# Patient Record
Sex: FEMALE | Race: WHITE | NOT HISPANIC OR LATINO | Employment: OTHER | URBAN - METROPOLITAN AREA
[De-identification: names, ages, dates, MRNs, and addresses within clinical notes are randomized per-mention and may not be internally consistent; named-entity substitution may affect disease eponyms.]

---

## 2017-01-16 ENCOUNTER — HOSPITAL ENCOUNTER (OUTPATIENT)
Dept: ONCOLOGY | Facility: CLINIC | Age: 57
Discharge: HOME | End: 2017-01-16
Attending: INTERNAL MEDICINE | Admitting: INTERNAL MEDICINE

## 2017-01-16 LAB
ALBUMIN SERPL-MCNC: 3.4 G/DL (ref 3.5–4.8)
ALBUMIN SERPL-MCNC: 3.5 G/DL (ref 3.5–4.8)
ALBUMIN/GLOB SERPL: 1.5 {RATIO} (ref 1–1.7)
ALP SERPL-CCNC: 78 IU/L (ref 32–91)
ALPHA1 GLOB FLD ELPH-MCNC: 0.1 GM/DL (ref 0.1–0.4)
ALPHA2 GLOB SERPL ELPH-MCNC: 0.8 GM/DL (ref 0.5–1)
ALT SERPL-CCNC: 28 IU/L (ref 14–54)
ANION GAP SERPL CALC-SCNC: 12.7 MMOL/L (ref 10–20)
AST SERPL-CCNC: 19 IU/L (ref 15–41)
B-GLOBULIN SERPL ELPH-MCNC: 0.9 GM/DL (ref 0.7–1.4)
BILIRUB SERPL-MCNC: 0.5 MG/DL (ref 0.3–1.2)
BUN SERPL-MCNC: 20 MG/DL (ref 8–20)
BUN/CREAT SERPL: 25 (ref 5.4–26.2)
CALCIUM SERPL-MCNC: 9 MG/DL (ref 8.9–10.3)
CHLORIDE SERPL-SCNC: 101 MMOL/L (ref 101–111)
CONV CO2: 28 MMOL/L (ref 22–32)
CONV TOTAL PROTEIN: 5.6 G/DL (ref 6.1–7.9)
CONV TOTAL PROTEIN: 5.7 G/DL (ref 6.1–7.9)
CREAT UR-MCNC: 0.8 MG/DL (ref 0.4–1)
GAMMA GLOB SERPL ELPH-MCNC: 0.3 GM/DL (ref 0.6–1.6)
GLOBULIN UR ELPH-MCNC: 2.3 G/DL (ref 2.5–3.8)
GLUCOSE SERPL-MCNC: 118 MG/DL (ref 65–99)
INSULIN SERPL-ACNC: ABNORMAL U[IU]/ML
POTASSIUM SERPL-SCNC: 3.7 MMOL/L (ref 3.6–5.1)
SODIUM SERPL-SCNC: 138 MMOL/L (ref 136–144)

## 2017-01-17 LAB — PROT PATTERN SERPL IFE-IMP: NORMAL

## 2017-02-09 ENCOUNTER — HOSPITAL ENCOUNTER (OUTPATIENT)
Dept: MRI IMAGING | Facility: HOSPITAL | Age: 57
Discharge: HOME | End: 2017-02-09
Attending: INTERNAL MEDICINE | Admitting: INTERNAL MEDICINE

## 2017-05-08 ENCOUNTER — HOSPITAL ENCOUNTER (OUTPATIENT)
Dept: ONCOLOGY | Facility: CLINIC | Age: 57
Discharge: HOME | End: 2017-05-08
Attending: INTERNAL MEDICINE | Admitting: INTERNAL MEDICINE

## 2017-05-08 LAB
ALBUMIN SERPL-MCNC: 4.1 G/DL (ref 3.5–4.8)
ALBUMIN/GLOB SERPL: 1.6 {RATIO} (ref 1–1.7)
ALP SERPL-CCNC: 100 IU/L (ref 32–91)
ALT SERPL-CCNC: 31 IU/L (ref 14–54)
ANION GAP SERPL CALC-SCNC: 18 MMOL/L (ref 10–20)
AST SERPL-CCNC: 24 IU/L (ref 15–41)
BILIRUB SERPL-MCNC: 0.5 MG/DL (ref 0.3–1.2)
BUN SERPL-MCNC: 14 MG/DL (ref 8–20)
BUN/CREAT SERPL: 15.6 (ref 5.4–26.2)
CALCIUM SERPL-MCNC: 10.1 MG/DL (ref 8.9–10.3)
CHLORIDE SERPL-SCNC: 103 MMOL/L (ref 101–111)
CONV CO2: 25 MMOL/L (ref 22–32)
CONV TOTAL PROTEIN: 6.7 G/DL (ref 6.1–7.9)
CREAT UR-MCNC: 0.9 MG/DL (ref 0.4–1)
GLOBULIN UR ELPH-MCNC: 2.6 G/DL (ref 2.5–3.8)
GLUCOSE SERPL-MCNC: 101 MG/DL (ref 65–99)
POTASSIUM SERPL-SCNC: 4 MMOL/L (ref 3.6–5.1)
SODIUM SERPL-SCNC: 142 MMOL/L (ref 136–144)

## 2017-07-12 ENCOUNTER — HOSPITAL ENCOUNTER (OUTPATIENT)
Dept: MAMMOGRAPHY | Facility: HOSPITAL | Age: 57
Discharge: HOME | End: 2017-07-12
Attending: INTERNAL MEDICINE | Admitting: INTERNAL MEDICINE

## 2017-09-11 ENCOUNTER — CLINICAL SUPPORT (OUTPATIENT)
Dept: ONCOLOGY | Facility: HOSPITAL | Age: 57
End: 2017-09-11

## 2017-09-11 ENCOUNTER — HOSPITAL ENCOUNTER (OUTPATIENT)
Dept: ONCOLOGY | Facility: HOSPITAL | Age: 57
Discharge: HOME | End: 2017-09-11
Attending: INTERNAL MEDICINE | Admitting: INTERNAL MEDICINE

## 2017-09-11 ENCOUNTER — HOSPITAL ENCOUNTER (OUTPATIENT)
Dept: ONCOLOGY | Facility: CLINIC | Age: 57
Setting detail: INFUSION SERIES
Discharge: HOME | End: 2017-09-11
Attending: INTERNAL MEDICINE | Admitting: INTERNAL MEDICINE

## 2017-09-11 NOTE — PROGRESS NOTES
PATIENTS ONCOLOGY RECORD LOCATED IN [facility]      Subjective     Name:  GINA SPARKS     Date:  2017  Address:  [address] [place] [address]  Home: [phone]  :  1960 AGE:  57 y.o.        RECORDS OBTAINED:  Patients Oncology Record is located in [facility]

## 2018-02-15 ENCOUNTER — HOSPITAL ENCOUNTER (OUTPATIENT)
Dept: MRI IMAGING | Facility: HOSPITAL | Age: 58
Discharge: HOME | End: 2018-02-15
Attending: INTERNAL MEDICINE | Admitting: INTERNAL MEDICINE

## 2018-03-12 ENCOUNTER — HOSPITAL ENCOUNTER (OUTPATIENT)
Dept: ONCOLOGY | Facility: HOSPITAL | Age: 58
Discharge: HOME | End: 2018-03-12
Attending: INTERNAL MEDICINE | Admitting: INTERNAL MEDICINE

## 2018-03-12 ENCOUNTER — HOSPITAL ENCOUNTER (OUTPATIENT)
Dept: ONCOLOGY | Facility: CLINIC | Age: 58
Setting detail: INFUSION SERIES
Discharge: HOME | End: 2018-03-12
Attending: INTERNAL MEDICINE | Admitting: INTERNAL MEDICINE

## 2018-03-12 ENCOUNTER — CLINICAL SUPPORT (OUTPATIENT)
Dept: ONCOLOGY | Facility: HOSPITAL | Age: 58
End: 2018-03-12

## 2018-03-12 NOTE — PROGRESS NOTES
PATIENTS ONCOLOGY RECORD LOCATED IN [facility]      Subjective     Name:  GINA SPARKS     Date:  2018  Address:  [address] [place] [address]  Home: [phone]  :  1960 AGE:  57 y.o.        RECORDS OBTAINED:  Patients Oncology Record is located in [facility]

## 2018-09-10 ENCOUNTER — CLINICAL SUPPORT (OUTPATIENT)
Dept: ONCOLOGY | Facility: HOSPITAL | Age: 58
End: 2018-09-10

## 2018-09-10 ENCOUNTER — HOSPITAL ENCOUNTER (OUTPATIENT)
Dept: ONCOLOGY | Facility: HOSPITAL | Age: 58
Discharge: HOME | End: 2018-09-10
Attending: INTERNAL MEDICINE | Admitting: INTERNAL MEDICINE

## 2018-09-10 ENCOUNTER — HOSPITAL ENCOUNTER (OUTPATIENT)
Dept: ONCOLOGY | Facility: CLINIC | Age: 58
Setting detail: INFUSION SERIES
Discharge: HOME | End: 2018-09-10
Attending: INTERNAL MEDICINE | Admitting: INTERNAL MEDICINE

## 2018-09-10 LAB
ALBUMIN SERPL-MCNC: 3.9 G/DL (ref 3.5–4.8)
ALBUMIN/GLOB SERPL: 1.5 {RATIO} (ref 1–1.7)
ALP SERPL-CCNC: 102 IU/L (ref 32–91)
ALT SERPL-CCNC: 26 IU/L (ref 14–54)
ANION GAP SERPL CALC-SCNC: 11.9 MMOL/L (ref 10–20)
AST SERPL-CCNC: 24 IU/L (ref 15–41)
BILIRUB SERPL-MCNC: 0.1 MG/DL (ref 0.3–1.2)
BUN SERPL-MCNC: 19 MG/DL (ref 8–20)
BUN/CREAT SERPL: 23.8 (ref 5.4–26.2)
CALCIUM SERPL-MCNC: 9.3 MG/DL (ref 8.9–10.3)
CHLORIDE SERPL-SCNC: 104 MMOL/L (ref 101–111)
CONV CO2: 25 MMOL/L (ref 22–32)
CONV TOTAL PROTEIN: 6.5 G/DL (ref 6.1–7.9)
CREAT UR-MCNC: 0.8 MG/DL (ref 0.4–1)
GLOBULIN UR ELPH-MCNC: 2.6 G/DL (ref 2.5–3.8)
GLUCOSE SERPL-MCNC: 102 MG/DL (ref 65–99)
POTASSIUM SERPL-SCNC: 3.9 MMOL/L (ref 3.6–5.1)
SODIUM SERPL-SCNC: 137 MMOL/L (ref 136–144)

## 2018-09-10 NOTE — PROGRESS NOTES
PATIENTS ONCOLOGY RECORD LOCATED IN [facility]      Subjective     Name:  GINA SPARKS     Date:  09/10/2018  Address:  [address] [place] [address]  Home: [phone]  :  1960 AGE:  58 y.o.        RECORDS OBTAINED:  Patients Oncology Record is located in [facility]

## 2018-09-18 ENCOUNTER — HOSPITAL ENCOUNTER (OUTPATIENT)
Dept: CT IMAGING | Facility: HOSPITAL | Age: 58
Discharge: HOME | End: 2018-09-18
Attending: INTERNAL MEDICINE | Admitting: INTERNAL MEDICINE

## 2019-02-04 ENCOUNTER — HOSPITAL ENCOUNTER (OUTPATIENT)
Dept: ONCOLOGY | Facility: HOSPITAL | Age: 59
Discharge: HOME | End: 2019-02-04
Attending: INTERNAL MEDICINE | Admitting: INTERNAL MEDICINE

## 2019-02-04 ENCOUNTER — CLINICAL SUPPORT (OUTPATIENT)
Dept: ONCOLOGY | Facility: HOSPITAL | Age: 59
End: 2019-02-04

## 2019-02-04 ENCOUNTER — HOSPITAL ENCOUNTER (OUTPATIENT)
Dept: ONCOLOGY | Facility: CLINIC | Age: 59
Setting detail: INFUSION SERIES
Discharge: HOME | End: 2019-02-04
Attending: INTERNAL MEDICINE | Admitting: INTERNAL MEDICINE

## 2019-02-04 LAB
ALBUMIN SERPL-MCNC: 3.6 G/DL (ref 3.5–4.8)
ALBUMIN SERPL-MCNC: 4 G/DL (ref 3.5–4.8)
ALBUMIN/GLOB SERPL: 1.6 {RATIO} (ref 1–1.7)
ALP SERPL-CCNC: 101 IU/L (ref 32–91)
ALPHA1 GLOB FLD ELPH-MCNC: 0.2 GM/DL (ref 0.1–0.4)
ALPHA2 GLOB SERPL ELPH-MCNC: 0.9 GM/DL (ref 0.5–1)
ALT SERPL-CCNC: 30 IU/L (ref 14–54)
ANION GAP SERPL CALC-SCNC: 14.1 MMOL/L (ref 10–20)
AST SERPL-CCNC: 24 IU/L (ref 15–41)
B-GLOBULIN SERPL ELPH-MCNC: 1.1 GM/DL (ref 0.7–1.4)
BILIRUB SERPL-MCNC: 0.4 MG/DL (ref 0.3–1.2)
BUN SERPL-MCNC: 17 MG/DL (ref 8–20)
BUN/CREAT SERPL: 18.9 (ref 5.4–26.2)
CALCIUM SERPL-MCNC: 9.4 MG/DL (ref 8.9–10.3)
CHLORIDE SERPL-SCNC: 103 MMOL/L (ref 101–111)
CHOLEST SERPL-MCNC: 116 MG/DL
CHOLEST/HDLC SERPL: 2.7 {RATIO}
CONV CO2: 26 MMOL/L (ref 22–32)
CONV LDL CHOLESTEROL DIRECT: 66 MG/DL (ref 0–100)
CONV TOTAL PROTEIN: 6.4 G/DL (ref 6.1–7.9)
CONV TOTAL PROTEIN: 6.5 G/DL (ref 6.1–7.9)
CREAT UR-MCNC: 0.9 MG/DL (ref 0.4–1)
GAMMA GLOB SERPL ELPH-MCNC: 0.6 GM/DL (ref 0.6–1.6)
GLOBULIN UR ELPH-MCNC: 2.5 G/DL (ref 2.5–3.8)
GLUCOSE SERPL-MCNC: 93 MG/DL (ref 65–99)
HDLC SERPL-MCNC: 43 MG/DL
INSULIN SERPL-ACNC: NORMAL U[IU]/ML
LDLC/HDLC SERPL: 1.5 {RATIO}
LIPID INTERPRETATION: NORMAL
POTASSIUM SERPL-SCNC: 4.1 MMOL/L (ref 3.6–5.1)
SODIUM SERPL-SCNC: 139 MMOL/L (ref 136–144)
TRIGL SERPL-MCNC: 69 MG/DL
VLDLC SERPL CALC-MCNC: 7.4 MG/DL

## 2019-02-04 NOTE — PROGRESS NOTES
PATIENTS ONCOLOGY RECORD LOCATED IN [facility]      Subjective     Name:  GINA SPARKS     Date:  2019  Address:  [address] [place] [address]  Home: [unfilled]  :  1960 AGE:  58 y.o.        RECORDS OBTAINED:  Patients Oncology Record is located in [facility]

## 2019-02-06 LAB — PROT PATTERN SERPL IFE-IMP: NORMAL

## 2019-02-11 ENCOUNTER — HOSPITAL ENCOUNTER (OUTPATIENT)
Dept: ONCOLOGY | Facility: HOSPITAL | Age: 59
Discharge: HOME | End: 2019-02-11
Attending: INTERNAL MEDICINE | Admitting: INTERNAL MEDICINE

## 2019-02-11 ENCOUNTER — CLINICAL SUPPORT (OUTPATIENT)
Dept: ONCOLOGY | Facility: HOSPITAL | Age: 59
End: 2019-02-11

## 2019-02-11 ENCOUNTER — HOSPITAL ENCOUNTER (OUTPATIENT)
Dept: ONCOLOGY | Facility: CLINIC | Age: 59
Setting detail: INFUSION SERIES
Discharge: HOME | End: 2019-02-11
Attending: INTERNAL MEDICINE | Admitting: INTERNAL MEDICINE

## 2019-02-11 NOTE — PROGRESS NOTES
PATIENTS ONCOLOGY RECORD LOCATED IN [facility]      Subjective     Name:  GINA SPARKS     Date:  2019  Address:  [address] [place] [address]  Home: [unfilled]  :  1960 AGE:  58 y.o.        RECORDS OBTAINED:  Patients Oncology Record is located in [facility]

## 2019-02-22 ENCOUNTER — HOSPITAL ENCOUNTER (OUTPATIENT)
Dept: MRI IMAGING | Facility: HOSPITAL | Age: 59
Discharge: HOME | End: 2019-02-22
Attending: INTERNAL MEDICINE | Admitting: INTERNAL MEDICINE

## 2019-07-15 ENCOUNTER — TELEPHONE (OUTPATIENT)
Dept: ONCOLOGY | Facility: CLINIC | Age: 59
End: 2019-07-15

## 2019-07-15 NOTE — TELEPHONE ENCOUNTER
Patient states she received letter stating it was time to schedule her mammogram.  Needs to be performed at [facility] [place]

## 2019-08-12 ENCOUNTER — APPOINTMENT (OUTPATIENT)
Dept: LAB | Facility: HOSPITAL | Age: 59
End: 2019-08-12

## 2019-08-12 ENCOUNTER — OFFICE VISIT (OUTPATIENT)
Dept: ONCOLOGY | Facility: CLINIC | Age: 59
End: 2019-08-12

## 2019-08-12 VITALS
TEMPERATURE: 99.1 F | DIASTOLIC BLOOD PRESSURE: 94 MMHG | BODY MASS INDEX: 39.57 KG/M2 | RESPIRATION RATE: 18 BRPM | SYSTOLIC BLOOD PRESSURE: 164 MMHG | WEIGHT: 209.6 LBS | HEIGHT: 61 IN | HEART RATE: 95 BPM

## 2019-08-12 DIAGNOSIS — C50.911 MALIGNANT NEOPLASM OF RIGHT BREAST IN FEMALE, ESTROGEN RECEPTOR POSITIVE, UNSPECIFIED SITE OF BREAST (HCC): Primary | ICD-10-CM

## 2019-08-12 DIAGNOSIS — D47.2 MONOCLONAL GAMMOPATHY: ICD-10-CM

## 2019-08-12 DIAGNOSIS — Z17.0 MALIGNANT NEOPLASM OF RIGHT BREAST IN FEMALE, ESTROGEN RECEPTOR POSITIVE, UNSPECIFIED SITE OF BREAST (HCC): Primary | ICD-10-CM

## 2019-08-12 DIAGNOSIS — Z78.0 POSTMENOPAUSAL: ICD-10-CM

## 2019-08-12 LAB
ALBUMIN SERPL-MCNC: 4 G/DL (ref 3.5–4.8)
ALBUMIN/GLOB SERPL: 1.7 G/DL (ref 1–1.7)
ALP SERPL-CCNC: 96 U/L (ref 32–91)
ALT SERPL W P-5'-P-CCNC: 31 U/L (ref 14–54)
ANION GAP SERPL CALCULATED.3IONS-SCNC: 14 MMOL/L (ref 5–15)
AST SERPL-CCNC: 23 U/L (ref 15–41)
BASOPHILS # BLD AUTO: 0.01 10*3/MM3 (ref 0–0.2)
BASOPHILS NFR BLD AUTO: 0.1 % (ref 0–1.5)
BILIRUB SERPL-MCNC: 0.5 MG/DL (ref 0.3–1.2)
BUN BLD-MCNC: 21 MG/DL (ref 8–20)
BUN/CREAT SERPL: 26.3 (ref 5.4–26.2)
CALCIUM SPEC-SCNC: 9.3 MG/DL (ref 8.9–10.3)
CHLORIDE SERPL-SCNC: 104 MMOL/L (ref 101–111)
CO2 SERPL-SCNC: 24 MMOL/L (ref 22–32)
CREAT BLD-MCNC: 0.8 MG/DL (ref 0.4–1)
DEPRECATED RDW RBC AUTO: 45.2 FL (ref 37–54)
EOSINOPHIL # BLD AUTO: 0.07 10*3/MM3 (ref 0–0.4)
EOSINOPHIL NFR BLD AUTO: 1 % (ref 0.3–6.2)
ERYTHROCYTE [DISTWIDTH] IN BLOOD BY AUTOMATED COUNT: 14.4 % (ref 12.3–15.4)
GFR SERPL CREATININE-BSD FRML MDRD: 73 ML/MIN/1.73
GLOBULIN UR ELPH-MCNC: 2.3 GM/DL (ref 2.5–3.8)
GLUCOSE BLD-MCNC: 100 MG/DL (ref 65–99)
HCT VFR BLD AUTO: 39.6 % (ref 34–46.6)
HGB BLD-MCNC: 13 G/DL (ref 12–15.9)
LYMPHOCYTES # BLD AUTO: 2.25 10*3/MM3 (ref 0.7–3.1)
LYMPHOCYTES NFR BLD AUTO: 31.8 % (ref 19.6–45.3)
MCH RBC QN AUTO: 28.8 PG (ref 26.6–33)
MCHC RBC AUTO-ENTMCNC: 32.8 G/DL (ref 31.5–35.7)
MCV RBC AUTO: 87.6 FL (ref 79–97)
MONOCYTES # BLD AUTO: 0.73 10*3/MM3 (ref 0.1–0.9)
MONOCYTES NFR BLD AUTO: 10.3 % (ref 5–12)
NEUTROPHILS # BLD AUTO: 4.01 10*3/MM3 (ref 1.7–7)
NEUTROPHILS NFR BLD AUTO: 56.8 % (ref 42.7–76)
PLATELET # BLD AUTO: 174 10*3/MM3 (ref 140–450)
PMV BLD AUTO: 11.9 FL (ref 6–12)
POTASSIUM BLD-SCNC: 4 MMOL/L (ref 3.6–5.1)
PROT SERPL-MCNC: 6.3 G/DL (ref 6.1–7.9)
RBC # BLD AUTO: 4.52 10*6/MM3 (ref 3.77–5.28)
SODIUM BLD-SCNC: 138 MMOL/L (ref 136–144)
WBC NRBC COR # BLD: 7.07 10*3/MM3 (ref 3.4–10.8)

## 2019-08-12 PROCEDURE — 99215 OFFICE O/P EST HI 40 MIN: CPT | Performed by: INTERNAL MEDICINE

## 2019-08-12 PROCEDURE — 85025 COMPLETE CBC W/AUTO DIFF WBC: CPT | Performed by: INTERNAL MEDICINE

## 2019-08-12 PROCEDURE — 80053 COMPREHEN METABOLIC PANEL: CPT | Performed by: NURSE PRACTITIONER

## 2019-08-12 RX ORDER — LEVOTHYROXINE SODIUM 0.05 MG/1
TABLET ORAL EVERY 24 HOURS
COMMUNITY
Start: 2018-01-13 | End: 2020-05-20

## 2019-08-12 RX ORDER — MELOXICAM 15 MG/1
TABLET ORAL EVERY 24 HOURS
COMMUNITY
Start: 2018-07-03 | End: 2019-08-28 | Stop reason: SDUPTHER

## 2019-08-12 RX ORDER — BUPROPION HYDROCHLORIDE 150 MG/1
150 TABLET ORAL EVERY MORNING
COMMUNITY
Start: 2019-08-01

## 2019-08-12 RX ORDER — ATORVASTATIN CALCIUM 20 MG/1
TABLET, FILM COATED ORAL
COMMUNITY
Start: 2019-08-02 | End: 2020-03-18 | Stop reason: SDUPTHER

## 2019-08-12 RX ORDER — PANTOPRAZOLE SODIUM 40 MG/1
40 TABLET, DELAYED RELEASE ORAL DAILY
COMMUNITY
Start: 2019-05-31

## 2019-08-12 RX ORDER — ESCITALOPRAM OXALATE 20 MG/1
20 TABLET ORAL DAILY
COMMUNITY
Start: 2019-06-22

## 2019-08-12 RX ORDER — MULTIVITAMIN WITH IRON
500 TABLET ORAL DAILY
COMMUNITY

## 2019-08-12 RX ORDER — COVID-19 ANTIGEN TEST
2 KIT MISCELLANEOUS
COMMUNITY
End: 2020-10-13

## 2019-08-12 NOTE — PROGRESS NOTES
Hematology/Oncology Outpatient Follow Up    PATIENT NAME:Sindy Martin  :1960  MRN: [id number]  PRIMARY CARE PHYSICIAN: Rubens Cali MD  REFERRING PHYSICIAN: Rubens Cali MD    Chief Complaint   Patient presents with   • Follow-up     Breast Cancer   • Follow-up     Monitor drug toxicity        HISTORY OF PRESENT ILLNESS:   This is a 58-year-old female who was originally seen on 14 after she presented with early stage right breast cancer.  Please refer to the details of my notes for more information.   • She had an abnormal mammogram in May 2014.    • 6/10/14 - She underwent ultrasound guided localization of the right breast mass with sentinel lymph node injection, right lumpectomy.  Pathology revealed one sentinel lymph node negative for malignancy.  Tumor was strongly positive for ER, IN and HER-2/hiro was negative.  Tumor measured 1 cm in greatest dimension.  It was a grade III disease.  All the margins were negative.  Pathologic stage is [id number].    • 14 - Patient’s tumor was sent for Oncotype DX assay.  This returned with a recurrent score and validation study, she has a 12% chance of distant recurrence despite five years of Tamoxifen over the next 10 years.  On the validation study her ER was positive, IN was positive and HER-2/hiro was negative.  • 14 - Patient had BRCA 1 and 2 mutational analysis which returned with BRCA 2 deleterious mutation involving [id number] on the BRCA 2 sequencing gene.    • 2014 - She had FSH and serum estradiol levels, which are noted to be in the postmenopausal range.    • 14 - Patient was initiated on adjuvant chemotherapy with Taxotere and Cytoxan along with Neulasta.     • 10/6/14 - Patient completed 4th cycle of chemotherapy with Cytoxan and Taxotere.   • Patient was seen by Dr. Buck who performed excisional biopsy of a mole on the left forearm and this was benign from history.     • 14 - Patient completed whole right breast  radiation under the direction of Dr. Mcgrath.  She received total dose of 4256 cGy.  • Patient has been seen by Dr. Son who will perform her complete hysterectomy due to BRCA positivity after the first of the year.      • 12/23/14 - Patient had a bone density, which was essentially normal.    • December 2014 - Patient was initiated on Arimidex 1 mg every day as well as Os-Quinton D.   • April 2015 - Patient underwent total robotic hysterectomy with bilateral adnexectomy.  Pathology was benign.  I have requested for official report of the above procedure and path reports.  Patient has been seen by Dr. Son on 4/29/15.    • 5/19/15 - Port catheter was removed.   • 5/28/15 - Bilateral diagnostic mammogram which was essentially unremarkable except that patient has heterogenously dense breasts.  • 6/22/15 - Patient was seen by Dr. Guo.  Follow up in one year was recommended.    • Fasting lipid profile done which showed total cholesterol of 234, HDL of 42, triglyceride 237, elevated, and LDH was 145, also elevated.  BUN 20, creatinine 0.87 and LFTs were essentially unremarkable.  WBC 6.7, hemoglobin 12.4 and platelet count 197,000.    • 12/5/15 - Fasting lipid profile showed total cholesterol 133, HDL 50 and LDL 67, triglyceride 81.  BUN 22, creatinine 0.7.  • 2/24/16 - Bilateral breast MRI which showed post-op scar change on the right breast, but no MR finding to suggest malignancy.  She has a 4 mm enhancing lesion in the lower right breast at the inframammary fold.  Patient’s breast exam and skin evaluation was essentially unremarkable at the site where the 4 mm enhancing lesion was noted.  Patient also had no specific complaints.  • 3/22/16 - Anemia work up with reticulocyte count (N), B12 (N) 521, ferritin (N) 43, folate 20, haptoglobin 184, , BUN 11, creatinine 11.8.  SPEP with immunofixation assay showed IgG kappa monoclonal protein with M spike of 0.2 gm/dL.  Erythropoietin level (N) 22 [range 2-18].     • 3/31/16 - Quantitative immunoglobulins for IgA, IgG and IgM were normal.  Serum free light chains were essentially unremarkable.    • 4/5/16 - 24-hour urine electrophoresis which did not show any evidence of monoclonal gammopathy.   • 6/10/16 - Bilateral mammogram was essentially normal.  Follow up in one year was recommended.    • 6/20/16 - Serum transferrin receptor assay returned with high level at 5.2 [range 1.9-4.9].    • 6/27/16 - Urinalysis showed trace blood.  • 8/1/16 - Patient was seen by Dr. Torres and had EGD and colonoscopy.  EGD showed hiatal hernia, moderate to severe gastroduodenitis, but no ulcers seen.  No abnormalities seen on her colonoscopy.   • 9/12/16 - Chemistry panel:  BUN 14, creatinine 0.8, total protein (L) 5.8, alkaline phosphatase 94 [range 32-91].  Quantitative immunoglobulins for IgA (N) 101, IgG slightly low at 530 [range 600-1500], IgM (N) 195.  SPEP with immunofixation assay was essentially negative for any significant monoclonal protein.   • 9/19/16 - Urinalysis was negative for hematuria.   • 10/12/16 - Bone scan showed degenerative changes in the knees, otherwise negative.  No signs of metastatic disease.    • 10/10 & 10/17/16 - Patient received IV Injectafer.    • 1/16/17 - SPEP with immunofixation assay which did not show any evidence of monoclonal protein.   • 2/9/17 - Bilateral breast MRI showed no evidence of malignancy, but she had a skin lesion noted on the left breast.  Patient has since then seen Dr. Buck.  She has a follow up appointment in six months.    • 6/26/17 - Patient had bilateral diagnostic mammogram with tomosynthesis.  This revealed new linear pleomorphic microcalcifications in the medial right breast.  The left breast was unchanged.  Stereotactic biopsy was recommended.    • 7/12/17 - Patient underwent stereotactic biopsy of the right breast calcifications.  Pathology showed fibrous mastopathy with coarse stromal microcalcifications suggestive of  previous procedure.  Negative for carcinoma in situ and invasive carcinoma.  The result was concurrent with the image findings.  Follow up in one year was recommended.    • 9/11/17 - Since her last visit patient had labs including SPEP with immunofixation assay which showed a free lambda light chain identified.  IgA was 83.  IgG was 460.  IgM was 158.  BUN 17.  Creatinine 0.6.  Alkaline phosphatase 101.  Serum calcium 9.2.  CMP was normal.    • 9/16/17 - DEXA scan was normal.    • 2/15/18 - Bilateral breast MRI with stable post-op changes seen at the right breast, but no mass was seen in either the right or left breast.  Bilateral breast mammogram is due June 2018.   • 3/12/18 - CBC:  WBC 7.9, hemoglobin 13, platelet count 183,000.  Chemistry panel:  BUN 19.  Creatinine 0.7.  Alkaline phosphatase 92.  IgA 511.  IgG low at 518.  IgM normal 191.  Kappa lambda free light chains were normal.  SPEP with LZAARO was normal.    • 7/30/18 - Bilateral diagnostic mammogram with 3D with no mammographic evidence of malignancy.    • 9/18/18 - CT scan of the abdomen and pelvis which showed post-op changes, but no masses were identified in the abdomen.    • 2/4/19 - SPEP with LAZARO which did not show any monoclonal protein.  Total cholesterol was 116, triglycerides 69, LDL 66, HDL 43.  Chemistry:  BUN 17, creatinine 0.9, liver function tests are normal.    · 2/22/2019-patient had bilateral breast MRI, there was no MR evidence of malignancy in either breast.    Past Medical History:   Diagnosis Date   • Arthritis    • Breast cancer (CMS/HCC)    • Bulging lumbar disc    • Bursitis    • Depression    • Disease of thyroid gland    • Hypertension        Past Surgical History:   Procedure Laterality Date   • APPENDECTOMY     • CARPAL TUNNEL RELEASE     • CHOLECYSTECTOMY     • OOPHORECTOMY           Current Outpatient Medications:   •  ANASTROZOLE PO, Take  by mouth., Disp: , Rfl:   •  aspirin 81 MG tablet, ASPIRIN 81 MG ORAL TABLET, Disp: ,  Rfl:   •  atorvastatin (LIPITOR) 20 MG tablet, , Disp: , Rfl:   •  buPROPion XL (WELLBUTRIN XL) 150 MG 24 hr tablet, , Disp: , Rfl:   •  Calcium-Magnesium-Vitamin D - MG-MG-UNIT tablet sustained-release 24 hour, Take 1 capsule by mouth Daily., Disp: , Rfl:   •  escitalopram (LEXAPRO) 20 MG tablet, , Disp: , Rfl:   •  levothyroxine (SYNTHROID) 50 MCG tablet, Daily., Disp: , Rfl:   •  loratadine-pseudoephedrine (CLARITIN-D 24-hour)  MG per 24 hr tablet, Take 1 tablet by mouth Daily., Disp: , Rfl:   •  Magnesium 250 MG tablet, Take 500 mg by mouth Daily., Disp: , Rfl:   •  Melatonin 3 MG capsule, Take  by mouth., Disp: , Rfl:   •  meloxicam (MOBIC) 15 MG tablet, Daily., Disp: , Rfl:   •  metFORMIN (GLUCOPHAGE) 500 MG tablet, , Disp: , Rfl:   •  Naproxen Sodium 220 MG capsule, Take 2 tablets by mouth., Disp: , Rfl:   •  NON FORMULARY, OSTEOBIFLEX 1 TAB DAILY, Disp: , Rfl:   •  pantoprazole (PROTONIX) 40 MG EC tablet, , Disp: , Rfl:     Allergies   Allergen Reactions   • Codeine Nausea And Vomiting   • Ibuprofen Anaphylaxis       Family History   Problem Relation Age of Onset   • Lung cancer Maternal Aunt    • Uterine cancer Paternal Aunt 60   • Prostate cancer Paternal Uncle    • Melanoma Paternal Grandmother    • Prostate cancer Paternal Cousin    • Melanoma Paternal Cousin    • Esophageal cancer Paternal Cousin    • Thyroid cancer Maternal Cousin 40   • Esophageal cancer Maternal Cousin    • Ovarian cancer Maternal Cousin 50       Cancer-related family history includes Esophageal cancer in her maternal cousin and paternal cousin; Lung cancer in her maternal aunt; Melanoma in her paternal cousin and paternal grandmother; Ovarian cancer (age of onset: 50) in her maternal cousin; Prostate cancer in her paternal cousin and paternal uncle; Thyroid cancer (age of onset: 40) in her maternal cousin; Uterine cancer (age of onset: 60) in her paternal aunt.    Social History     Tobacco Use   • Smoking status:  "Never Smoker   Substance Use Topics   • Alcohol use: Yes     Frequency: Monthly or less   • Drug use: No       I have reviewed the history of present illness, past medical history, family history, social history, lab results, all notes and other records since the patient was last seen on 7/15/2019.    SUBJECTIVE:  The patient is here for a follow up appointment.  She denies any lumps, nipple discharge, and skin discoloration in her breasts.           REVIEW OF SYSTEMS:  Review of Systems   Constitutional: Negative for chills and fever.   HENT: Negative for ear pain, mouth sores, nosebleeds and sore throat.    Eyes: Negative for photophobia and visual disturbance.   Respiratory: Negative for wheezing and stridor.    Cardiovascular: Negative for chest pain and palpitations.   Gastrointestinal: Negative for abdominal pain, diarrhea, nausea and vomiting.   Endocrine: Negative for cold intolerance and heat intolerance.   Genitourinary: Negative for dysuria and hematuria.   Musculoskeletal: Negative for joint swelling and neck stiffness.   Skin: Negative for color change and rash.   Neurological: Negative for seizures and syncope.   Hematological: Negative for adenopathy.        No obvious bleeding   Psychiatric/Behavioral: Negative for agitation, confusion and hallucinations.       OBJECTIVE:    Vitals:    08/12/19 1156   BP: 164/94   Pulse: 95   Resp: 18   Temp: 99.1 °F (37.3 °C)   Weight: 95.1 kg (209 lb 9.6 oz)   Height: 154.9 cm (61\")   PainSc: 0-No pain       ECOG  (1) Restricted in physically strenuous activity, ambulatory and able to do work of light nature    Physical Exam   Constitutional: She is oriented to person, place, and time. No distress.   HENT:   Head: Normocephalic and atraumatic.   Eyes: Conjunctivae and EOM are normal. Right eye exhibits no discharge. Left eye exhibits no discharge. No scleral icterus.   Neck: Normal range of motion. Neck supple. No thyromegaly present.   Cardiovascular: Normal rate, " regular rhythm and normal heart sounds. Exam reveals no gallop and no friction rub.   Pulmonary/Chest: Effort normal. No stridor. No respiratory distress. She has no wheezes. Right breast exhibits no inverted nipple, no mass, no nipple discharge, no skin change and no tenderness. Left breast exhibits no inverted nipple, no mass, no nipple discharge, no skin change and no tenderness. Breasts are symmetrical.   Abdominal: Soft. Bowel sounds are normal. She exhibits no mass. There is no tenderness. There is no rebound and no guarding.   Genitourinary: No breast bleeding.   Musculoskeletal: Normal range of motion. She exhibits no tenderness.   Lymphadenopathy:     She has no cervical adenopathy.   Neurological: She is alert and oriented to person, place, and time. She exhibits normal muscle tone.   Skin: Skin is warm. No rash noted. She is not diaphoretic. No erythema.   Psychiatric: She has a normal mood and affect. Her behavior is normal.   Nursing note and vitals reviewed.      RECENT LABS  WBC   Date Value Ref Range Status   08/12/2019 7.07 3.40 - 10.80 10*3/mm3 Final     RBC   Date Value Ref Range Status   08/12/2019 4.52 3.77 - 5.28 10*6/mm3 Final     Hemoglobin   Date Value Ref Range Status   08/12/2019 13.0 12.0 - 15.9 g/dL Final     Hematocrit   Date Value Ref Range Status   08/12/2019 39.6 34.0 - 46.6 % Final     MCV   Date Value Ref Range Status   08/12/2019 87.6 79.0 - 97.0 fL Final     MCH   Date Value Ref Range Status   08/12/2019 28.8 26.6 - 33.0 pg Final     MCHC   Date Value Ref Range Status   08/12/2019 32.8 31.5 - 35.7 g/dL Final     RDW   Date Value Ref Range Status   08/12/2019 14.4 12.3 - 15.4 % Final     RDW-SD   Date Value Ref Range Status   08/12/2019 45.2 37.0 - 54.0 fl Final     MPV   Date Value Ref Range Status   08/12/2019 11.9 6.0 - 12.0 fL Final     Platelets   Date Value Ref Range Status   08/12/2019 174 140 - 450 10*3/mm3 Final     Neutrophil %   Date Value Ref Range Status   08/12/2019  56.8 42.7 - 76.0 % Final     Lymphocyte %   Date Value Ref Range Status   08/12/2019 31.8 19.6 - 45.3 % Final     Monocyte %   Date Value Ref Range Status   08/12/2019 10.3 5.0 - 12.0 % Final     Eosinophil %   Date Value Ref Range Status   08/12/2019 1.0 0.3 - 6.2 % Final     Basophil %   Date Value Ref Range Status   08/12/2019 0.1 0.0 - 1.5 % Final     Neutrophils, Absolute   Date Value Ref Range Status   08/12/2019 4.01 1.70 - 7.00 10*3/mm3 Final     Lymphocytes, Absolute   Date Value Ref Range Status   08/12/2019 2.25 0.70 - 3.10 10*3/mm3 Final     Monocytes, Absolute   Date Value Ref Range Status   08/12/2019 0.73 0.10 - 0.90 10*3/mm3 Final     Eosinophils, Absolute   Date Value Ref Range Status   08/12/2019 0.07 0.00 - 0.40 10*3/mm3 Final     Basophils, Absolute   Date Value Ref Range Status   08/12/2019 0.01 0.00 - 0.20 10*3/mm3 Final       Lab Results   Component Value Date    GLUCOSE 93 02/04/2019    BUN 17 02/04/2019    CREATININE 0.9 02/04/2019    EGFRIFNONA 99 11/10/2016    EGFRIFAFRI 85 11/10/2016    BCR 18.9 02/04/2019    K 4.1 02/04/2019    CO2 26 02/04/2019    CALCIUM 9.4 02/04/2019    ALBUMIN 4.0 02/04/2019    ALBUMIN 3.6 02/04/2019    LABIL2 1.6 02/04/2019    AST 24 02/04/2019    ALT 30 02/04/2019         Assessment/Plan     Malignant neoplasm of right breast in female, estrogen receptor positive, unspecified site of breast (CMS/HCC)  - CBC & Differential  - Comprehensive Metabolic Panel  - CBC Auto Differential      ASSESSMENT:    1. T1b N0 M0 invasive ductal carcinoma, ER positive, MS positive, HER-2/hiro negative, grade III tumor.  2. Status post right lumpectomy with sentinel lymph node biopsy.    3. Status post adjuvant chemotherapy with Cytoxan and Taxotere for four cycles.    4. BRCA 2 sequencing gene with 3036 deletion 4 deleterious mutation.   5. Status post right breast radiation.   6. Postmenopausal hormonal status.   7. Status post robotic complete hysterectomy and adnexectomy with  benign findings.    8. Hypercholesterolemia on treatment.  Lipid panel 2/4/19 was normal.   9. Arimidex initiated December 2014, discontinued October 2016 due to side effects.  Aromasin initiated November 2016.   10. Iron deficiency anemia, resolved.   11. Monoclonal gammopathy of unknown significance.  Recent monoclonal labs from 2/4/19 were negative.   12. Bilateral hip pain.  Resolved    PLAN:     Patient is due for bilateral diagnostic mammogram.  I will go ahead and set her up for this.  She will continue Aromasin, Os-Quinton D.  Patient will also be due for bone density in September 2019.  This will be scheduled today.  She will continue monthly breast self exam and call for lumps, nipple discharge, skin discoloration. A CMP will be done today.  Her monoclonal labs will need to be checked in six months from now.  She will follow up with Dr. Torres for pancreatic cancer screening.      Patient is five years out from her breast cancer diagnosis.  I did discuss prophylactic mastectomies.  At this time patient wants to continue with breast conservation.  I may have to extend Aromasin for her for breast cancer prevention.  She has not had any significant toxicities on this treatment.           I have reviewed labs results, imaging, vitals, and medications with the patient today. Will follow up in 6 months with me.         Patient verbalized understanding and is in agreement of the above plan.    Part of this document was scribed by Sameera Barros RN, BSN.        Much of the above report is an electronic transcription/translation of the spoken language to printed text using Dragon Software. As such, the subtleties and finesse of the spoken language may permit erroneous, or at times, nonsensical words or phrases to be inadvertently transcribed; thus changes may be made at a later date to rectify these errors.

## 2019-08-28 RX ORDER — MELOXICAM 15 MG/1
TABLET ORAL
Qty: 90 TABLET | Refills: 0 | Status: SHIPPED | OUTPATIENT
Start: 2019-08-28 | End: 2019-11-21 | Stop reason: SDUPTHER

## 2019-08-29 ENCOUNTER — HOSPITAL ENCOUNTER (OUTPATIENT)
Dept: BONE DENSITY | Facility: HOSPITAL | Age: 59
Discharge: HOME | End: 2019-08-29

## 2019-08-29 ENCOUNTER — HOSPITAL ENCOUNTER (OUTPATIENT)
Dept: MAMMOGRAPHY | Facility: HOSPITAL | Age: 59
Discharge: HOME | End: 2019-08-29
Admitting: INTERNAL MEDICINE

## 2019-08-29 DIAGNOSIS — Z78.0 POSTMENOPAUSAL: ICD-10-CM

## 2019-08-29 DIAGNOSIS — C50.911 MALIGNANT NEOPLASM OF RIGHT BREAST IN FEMALE, ESTROGEN RECEPTOR POSITIVE, UNSPECIFIED SITE OF BREAST (HCC): ICD-10-CM

## 2019-08-29 DIAGNOSIS — Z17.0 MALIGNANT NEOPLASM OF RIGHT BREAST IN FEMALE, ESTROGEN RECEPTOR POSITIVE, UNSPECIFIED SITE OF BREAST (HCC): ICD-10-CM

## 2019-08-29 PROCEDURE — 77080 DXA BONE DENSITY AXIAL: CPT

## 2019-08-29 PROCEDURE — 77066 DX MAMMO INCL CAD BI: CPT

## 2019-08-29 PROCEDURE — G0279 TOMOSYNTHESIS, MAMMO: HCPCS

## 2019-09-16 RX ORDER — EXEMESTANE 25 MG/1
TABLET ORAL
Qty: 90 TABLET | Refills: 1 | Status: SHIPPED | OUTPATIENT
Start: 2019-09-16 | End: 2020-03-16

## 2019-11-08 ENCOUNTER — RESEARCH ENCOUNTER (OUTPATIENT)
Dept: ONCOLOGY | Facility: CLINIC | Age: 59
End: 2019-11-08

## 2019-11-21 RX ORDER — MELOXICAM 15 MG/1
TABLET ORAL
Qty: 90 TABLET | Refills: 0 | Status: SHIPPED | OUTPATIENT
Start: 2019-11-21 | End: 2020-02-18

## 2020-02-06 NOTE — PROGRESS NOTES
Hematology/Oncology Outpatient Follow Up    PATIENT NAME:Sindy Martin  :1960  MRN: [id number]  PRIMARY CARE PHYSICIAN: Rubens Cali MD  REFERRING PHYSICIAN: Rubens Cali MD    Chief Complaint   Patient presents with   • Follow-up     Breast cancer   • Follow-up     Monitor drug toxicity        HISTORY OF PRESENT ILLNESS:   This is a 59-year-old female who was originally seen on 14 after she presented with early stage right breast cancer.  Please refer to the details of my notes for more information.   • She had an abnormal mammogram in May 2014.    • 6/10/14 - She underwent ultrasound guided localization of the right breast mass with sentinel lymph node injection, right lumpectomy.  Pathology revealed one sentinel lymph node negative for malignancy.  Tumor was strongly positive for ER, WY and HER-2/hiro was negative.  Tumor measured 1 cm in greatest dimension.  It was a grade III disease.  All the margins were negative.  Pathologic stage is [id number].    • 14 - Patient’s tumor was sent for Oncotype DX assay.  This returned with a recurrent score and validation study, she has a 12% chance of distant recurrence despite five years of Tamoxifen over the next 10 years.  On the validation study her ER was positive, WY was positive and HER-2/hiro was negative.  • 14 - Patient had BRCA 1 and 2 mutational analysis which returned with BRCA 2 deleterious mutation involving [id number] on the BRCA 2 sequencing gene.    • 2014 - She had FSH and serum estradiol levels, which are noted to be in the postmenopausal range.    • 14 - Patient was initiated on adjuvant chemotherapy with Taxotere and Cytoxan along with Neulasta.     • 10/6/14 - Patient completed 4th cycle of chemotherapy with Cytoxan and Taxotere.   • Patient was seen by Dr. Buck who performed excisional biopsy of a mole on the left forearm and this was benign from history.     • 14 - Patient completed whole right breast  radiation under the direction of Dr. Mcgrath.  She received total dose of 4256 cGy.  • Patient has been seen by Dr. Son who will perform her complete hysterectomy due to BRCA positivity after the first of the year.      • 12/23/14 - Patient had a bone density, which was essentially normal.    • December 2014 - Patient was initiated on Arimidex 1 mg every day as well as Os-Quinton D.   • April 2015 - Patient underwent total robotic hysterectomy with bilateral adnexectomy.  Pathology was benign.  I have requested for official report of the above procedure and path reports.  Patient has been seen by Dr. Son on 4/29/15.    • 5/19/15 - Port catheter was removed.   • 5/28/15 - Bilateral diagnostic mammogram which was essentially unremarkable except that patient has heterogenously dense breasts.  • 6/22/15 - Patient was seen by Dr. Guo.  Follow up in one year was recommended.    • Fasting lipid profile done which showed total cholesterol of 234, HDL of 42, triglyceride 237, elevated, and LDH was 145, also elevated.  BUN 20, creatinine 0.87 and LFTs were essentially unremarkable.  WBC 6.7, hemoglobin 12.4 and platelet count 197,000.    • 12/5/15 - Fasting lipid profile showed total cholesterol 133, HDL 50 and LDL 67, triglyceride 81.  BUN 22, creatinine 0.7.  • 2/24/16 - Bilateral breast MRI which showed post-op scar change on the right breast, but no MR finding to suggest malignancy.  She has a 4 mm enhancing lesion in the lower right breast at the inframammary fold.  Patient’s breast exam and skin evaluation was essentially unremarkable at the site where the 4 mm enhancing lesion was noted.  Patient also had no specific complaints.  • 3/22/16 - Anemia work up with reticulocyte count (N), B12 (N) 521, ferritin (N) 43, folate 20, haptoglobin 184, , BUN 11, creatinine 11.8.  SPEP with immunofixation assay showed IgG kappa monoclonal protein with M spike of 0.2 gm/dL.  Erythropoietin level (N) 22 [range 2-18].     • 3/31/16 - Quantitative immunoglobulins for IgA, IgG and IgM were normal.  Serum free light chains were essentially unremarkable.    • 4/5/16 - 24-hour urine electrophoresis which did not show any evidence of monoclonal gammopathy.   • 6/10/16 - Bilateral mammogram was essentially normal.  Follow up in one year was recommended.    • 6/20/16 - Serum transferrin receptor assay returned with high level at 5.2 [range 1.9-4.9].    • 6/27/16 - Urinalysis showed trace blood.  • 8/1/16 - Patient was seen by Dr. Torres and had EGD and colonoscopy.  EGD showed hiatal hernia, moderate to severe gastroduodenitis, but no ulcers seen.  No abnormalities seen on her colonoscopy.   • 9/12/16 - Chemistry panel:  BUN 14, creatinine 0.8, total protein (L) 5.8, alkaline phosphatase 94 [range 32-91].  Quantitative immunoglobulins for IgA (N) 101, IgG slightly low at 530 [range 600-1500], IgM (N) 195.  SPEP with immunofixation assay was essentially negative for any significant monoclonal protein.   • 9/19/16 - Urinalysis was negative for hematuria.   • 10/12/16 - Bone scan showed degenerative changes in the knees, otherwise negative.  No signs of metastatic disease.    • 10/10 & 10/17/16 - Patient received IV Injectafer.    • 1/16/17 - SPEP with immunofixation assay which did not show any evidence of monoclonal protein.   • 2/9/17 - Bilateral breast MRI showed no evidence of malignancy, but she had a skin lesion noted on the left breast.  Patient has since then seen Dr. Buck.  She has a follow up appointment in six months.    • 6/26/17 - Patient had bilateral diagnostic mammogram with tomosynthesis.  This revealed new linear pleomorphic microcalcifications in the medial right breast.  The left breast was unchanged.  Stereotactic biopsy was recommended.    • 7/12/17 - Patient underwent stereotactic biopsy of the right breast calcifications.  Pathology showed fibrous mastopathy with coarse stromal microcalcifications suggestive of  previous procedure.  Negative for carcinoma in situ and invasive carcinoma.  The result was concurrent with the image findings.  Follow up in one year was recommended.    • 9/11/17 - Since her last visit patient had labs including SPEP with immunofixation assay which showed a free lambda light chain identified.  IgA was 83.  IgG was 460.  IgM was 158.  BUN 17.  Creatinine 0.6.  Alkaline phosphatase 101.  Serum calcium 9.2.  CMP was normal.    • 9/16/17 - DEXA scan was normal.    • 2/15/18 - Bilateral breast MRI with stable post-op changes seen at the right breast, but no mass was seen in either the right or left breast.  Bilateral breast mammogram is due June 2018.   • 3/12/18 - CBC:  WBC 7.9, hemoglobin 13, platelet count 183,000.  Chemistry panel:  BUN 19.  Creatinine 0.7.  Alkaline phosphatase 92.  IgA 511.  IgG low at 518.  IgM normal 191.  Kappa lambda free light chains were normal.  SPEP with LAZARO was normal.    • 7/30/18 - Bilateral diagnostic mammogram with 3D with no mammographic evidence of malignancy.    • 9/18/18 - CT scan of the abdomen and pelvis which showed post-op changes, but no masses were identified in the abdomen.    • 2/4/19 - SPEP with LAZARO which did not show any monoclonal protein.  Total cholesterol was 116, triglycerides 69, LDL 66, HDL 43.  Chemistry:  BUN 17, creatinine 0.9, liver function tests are normal.    · 2/22/2019-patient had bilateral breast MRI, there was no MR evidence of malignancy in either breast.  · 8/29/19-patient had a DEXA scan, this was normal bone mineral density  · 8/29/2019-patient had bilateral mammogram which showed heterogeneously dense breast tissue.  Scar tissue was noted on the right breast consistent with previous surgery.  Follow-up in 1 year was recommended    Past Medical History:   Diagnosis Date   • Arthritis    • Breast cancer (CMS/HCC)    • Bulging lumbar disc    • Bursitis    • Depression    • Disease of thyroid gland    • Hypertension        Past  Surgical History:   Procedure Laterality Date   • APPENDECTOMY     • BREAST BIOPSY     • BREAST LUMPECTOMY     • CARPAL TUNNEL RELEASE     • CHOLECYSTECTOMY     • HYSTERECTOMY     • OOPHORECTOMY           Current Outpatient Medications:   •  aspirin 81 MG tablet, ASPIRIN 81 MG ORAL TABLET, Disp: , Rfl:   •  atorvastatin (LIPITOR) 20 MG tablet, , Disp: , Rfl:   •  buPROPion XL (WELLBUTRIN XL) 150 MG 24 hr tablet, , Disp: , Rfl:   •  Calcium-Magnesium-Vitamin D - MG-MG-UNIT tablet sustained-release 24 hour, Take 1 capsule by mouth Daily., Disp: , Rfl:   •  escitalopram (LEXAPRO) 20 MG tablet, , Disp: , Rfl:   •  exemestane (AROMASIN) 25 MG chemo tablet, TAKE 1 TABLET DAILY, Disp: 90 tablet, Rfl: 1  •  levothyroxine (SYNTHROID) 50 MCG tablet, Daily., Disp: , Rfl:   •  loratadine-pseudoephedrine (CLARITIN-D 24-hour)  MG per 24 hr tablet, Take 1 tablet by mouth Daily., Disp: , Rfl:   •  Magnesium 250 MG tablet, Take 500 mg by mouth Daily., Disp: , Rfl:   •  Melatonin 3 MG capsule, Take  by mouth., Disp: , Rfl:   •  meloxicam (MOBIC) 15 MG tablet, TAKE 1 TABLET BY MOUTH DAILY, Disp: 90 tablet, Rfl: 0  •  metFORMIN (GLUCOPHAGE) 500 MG tablet, , Disp: , Rfl:   •  Naproxen Sodium 220 MG capsule, Take 2 tablets by mouth., Disp: , Rfl:   •  NON FORMULARY, OSTEOBIFLEX 1 TAB DAILY, Disp: , Rfl:   •  pantoprazole (PROTONIX) 40 MG EC tablet, , Disp: , Rfl:   •  HYDROcodone-acetaminophen (NORCO) 5-325 MG per tablet, TK 1 T PO Q 4 TO 6 H PRN P, Disp: , Rfl:     Allergies   Allergen Reactions   • Codeine Nausea And Vomiting   • Ibuprofen Anaphylaxis       Family History   Problem Relation Age of Onset   • Lung cancer Maternal Aunt    • Uterine cancer Paternal Aunt 60   • Prostate cancer Paternal Uncle    • Melanoma Paternal Grandmother    • Prostate cancer Paternal Cousin    • Melanoma Paternal Cousin    • Esophageal cancer Paternal Cousin    • Thyroid cancer Maternal Cousin 40   • Esophageal cancer Maternal Cousin     • Ovarian cancer Maternal Cousin 50       Cancer-related family history includes Esophageal cancer in her maternal cousin and paternal cousin; Lung cancer in her maternal aunt; Melanoma in her paternal cousin and paternal grandmother; Ovarian cancer (age of onset: 50) in her maternal cousin; Prostate cancer in her paternal cousin and paternal uncle; Thyroid cancer (age of onset: 40) in her maternal cousin; Uterine cancer (age of onset: 60) in her paternal aunt.    Social History     Tobacco Use   • Smoking status: Never Smoker   • Smokeless tobacco: Never Used   Substance Use Topics   • Alcohol use: Yes     Frequency: Monthly or less     Comment: occasionally   • Drug use: Never       I have reviewed the history of present illness, past medical history, family history, social history, lab results, all notes and other records since the patient was last seen on 7/15/2019.    SUBJECTIVE:  The patient is here for a follow up appointment.  She denies any lumps, nipple discharge, and skin discoloration in her breasts.           REVIEW OF SYSTEMS:  Review of Systems   Constitutional: Negative for chills and fever.   HENT: Negative for ear pain, mouth sores, nosebleeds and sore throat.    Eyes: Negative for photophobia and visual disturbance.   Respiratory: Negative for wheezing and stridor.    Cardiovascular: Negative for chest pain and palpitations.   Gastrointestinal: Negative for abdominal pain, diarrhea, nausea and vomiting.   Endocrine: Negative for cold intolerance and heat intolerance.   Genitourinary: Negative for dysuria and hematuria.   Musculoskeletal: Negative for joint swelling and neck stiffness.   Skin: Negative for color change and rash.   Neurological: Negative for seizures and syncope.   Hematological: Negative for adenopathy.        No obvious bleeding   Psychiatric/Behavioral: Negative for agitation, confusion and hallucinations.       OBJECTIVE:    Vitals:    02/10/20 1125   BP: 137/81   Pulse: 89  "  Resp: 18   Temp: 97.9 °F (36.6 °C)   Weight: 97.9 kg (215 lb 12.8 oz)   Height: 154.9 cm (61\")   PainSc: 0-No pain       ECOG  (1) Restricted in physically strenuous activity, ambulatory and able to do work of light nature    Physical Exam   Constitutional: She is oriented to person, place, and time. No distress.   HENT:   Head: Normocephalic and atraumatic.   Eyes: Conjunctivae and EOM are normal. Right eye exhibits no discharge. Left eye exhibits no discharge. No scleral icterus.   Neck: Normal range of motion. Neck supple. No thyromegaly present.   Cardiovascular: Normal rate, regular rhythm and normal heart sounds. Exam reveals no gallop and no friction rub.   Pulmonary/Chest: Effort normal. No stridor. No respiratory distress. She has no wheezes. Right breast exhibits no inverted nipple, no mass, no nipple discharge, no skin change and no tenderness. Left breast exhibits no inverted nipple, no mass, no nipple discharge, no skin change and no tenderness. No breast bleeding. Breasts are symmetrical.   Bilateral breast exam is negative  Bilateral axillary exam is negative   Abdominal: Soft. Bowel sounds are normal. She exhibits no mass. There is no tenderness. There is no rebound and no guarding.   Genitourinary: No breast bleeding.   Musculoskeletal: Normal range of motion. She exhibits no tenderness.   Lymphadenopathy:     She has no cervical adenopathy.   Neurological: She is alert and oriented to person, place, and time. She exhibits normal muscle tone.   Skin: Skin is warm. No rash noted. She is not diaphoretic. No erythema.   Psychiatric: She has a normal mood and affect. Her behavior is normal.   Nursing note and vitals reviewed.      RECENT LABS  WBC   Date Value Ref Range Status   02/10/2020 7.84 3.40 - 10.80 10*3/mm3 Final     RBC   Date Value Ref Range Status   02/10/2020 4.53 3.77 - 5.28 10*6/mm3 Final     Hemoglobin   Date Value Ref Range Status   02/10/2020 12.9 12.0 - 15.9 g/dL Final "     Hematocrit   Date Value Ref Range Status   02/10/2020 39.0 34.0 - 46.6 % Final     MCV   Date Value Ref Range Status   02/10/2020 86.1 79.0 - 97.0 fL Final     MCH   Date Value Ref Range Status   02/10/2020 28.5 26.6 - 33.0 pg Final     MCHC   Date Value Ref Range Status   02/10/2020 33.1 31.5 - 35.7 g/dL Final     RDW   Date Value Ref Range Status   02/10/2020 14.9 12.3 - 15.4 % Final     RDW-SD   Date Value Ref Range Status   02/10/2020 45.5 37.0 - 54.0 fl Final     MPV   Date Value Ref Range Status   02/10/2020 11.1 6.0 - 12.0 fL Final     Platelets   Date Value Ref Range Status   02/10/2020 217 140 - 450 10*3/mm3 Final     Neutrophil %   Date Value Ref Range Status   02/10/2020 54.7 42.7 - 76.0 % Final     Lymphocyte %   Date Value Ref Range Status   02/10/2020 35.3 19.6 - 45.3 % Final     Monocyte %   Date Value Ref Range Status   02/10/2020 8.5 5.0 - 12.0 % Final     Eosinophil %   Date Value Ref Range Status   02/10/2020 1.4 0.3 - 6.2 % Final     Basophil %   Date Value Ref Range Status   02/10/2020 0.1 0.0 - 1.5 % Final     Neutrophils, Absolute   Date Value Ref Range Status   02/10/2020 4.28 1.70 - 7.00 10*3/mm3 Final     Lymphocytes, Absolute   Date Value Ref Range Status   02/10/2020 2.77 0.70 - 3.10 10*3/mm3 Final     Monocytes, Absolute   Date Value Ref Range Status   02/10/2020 0.67 0.10 - 0.90 10*3/mm3 Final     Eosinophils, Absolute   Date Value Ref Range Status   02/10/2020 0.11 0.00 - 0.40 10*3/mm3 Final     Basophils, Absolute   Date Value Ref Range Status   02/10/2020 0.01 0.00 - 0.20 10*3/mm3 Final       Lab Results   Component Value Date    GLUCOSE 100 (H) 08/12/2019    BUN 21 (H) 08/12/2019    CREATININE 0.80 08/12/2019    EGFRIFNONA 73 08/12/2019    EGFRIFAFRI 85 11/10/2016    BCR 26.3 (H) 08/12/2019    K 4.0 08/12/2019    CO2 24.0 08/12/2019    CALCIUM 9.3 08/12/2019    ALBUMIN 4.00 08/12/2019    LABIL2 1.6 02/04/2019    AST 23 08/12/2019    ALT 31 08/12/2019         Assessment/Plan      Malignant neoplasm of right breast in female, estrogen receptor positive, unspecified site of breast (CMS/HCC)  - MRI Breast Bilateral With & Without Contrast      ASSESSMENT:    1. T1b N0 M0 invasive ductal carcinoma, ER positive, IN positive, HER-2/hiro negative, grade III tumor.  2. Status post right lumpectomy with sentinel lymph node biopsy.    3. Status post adjuvant chemotherapy with Cytoxan and Taxotere for four cycles.    4. BRCA 2 sequencing gene with 3036 deletion 4 deleterious mutation.   5. Status post right breast radiation.   6. Postmenopausal hormonal status.   7. Status post robotic complete hysterectomy and adnexectomy with benign findings.    8. Hypercholesterolemia on treatment.  Lipid panel 2/4/19 was normal.   9. Arimidex initiated December 2014, discontinued October 2016 due to side effects.  Aromasin initiated November 2016.   10. Iron deficiency anemia, resolved.   11. Monoclonal gammopathy of unknown significance.  Recent monoclonal labs from 2/4/19 were negative.   12. Bilateral hip pain.  Resolved    PLAN:     She will continue Aromasin, Os-Quinton D.  Patient will also be due for bone density in September 2021.  This will be scheduled today.  She will continue monthly breast self exam and call for lumps, nipple discharge, skin discoloration. A CMP will be done today.  Her monoclonal labs will need to be checked in six months from now.  She will follow up with Dr. Torres for pancreatic cancer screening.    Patient is due for breast MRI now, will go ahead and schedule Feb 2020  Patient is five years out from her breast cancer diagnosis.  I did discuss prophylactic mastectomies.  At this time patient wants to continue with breast conservation.  I may have to extend Aromasin for her for breast cancer prevention.  She has not had any significant toxicities on this treatment.      Monoclonal labs were also ordered today       I have reviewed labs results, imaging, vitals, and medications with the  patient today. Will follow up in 6 months with me.         Patient verbalized understanding and is in agreement of the above plan.    Part of this document was scribed by Sameera Barros RN, BSN.

## 2020-02-10 ENCOUNTER — OFFICE VISIT (OUTPATIENT)
Dept: ONCOLOGY | Facility: CLINIC | Age: 60
End: 2020-02-10

## 2020-02-10 ENCOUNTER — OFFICE VISIT (OUTPATIENT)
Dept: LAB | Facility: HOSPITAL | Age: 60
End: 2020-02-10

## 2020-02-10 VITALS
BODY MASS INDEX: 40.75 KG/M2 | RESPIRATION RATE: 18 BRPM | SYSTOLIC BLOOD PRESSURE: 137 MMHG | HEIGHT: 61 IN | HEART RATE: 89 BPM | WEIGHT: 215.8 LBS | DIASTOLIC BLOOD PRESSURE: 81 MMHG | TEMPERATURE: 97.9 F

## 2020-02-10 DIAGNOSIS — C50.911 MALIGNANT NEOPLASM OF RIGHT BREAST IN FEMALE, ESTROGEN RECEPTOR POSITIVE, UNSPECIFIED SITE OF BREAST (HCC): ICD-10-CM

## 2020-02-10 DIAGNOSIS — D47.2 MONOCLONAL GAMMOPATHY: ICD-10-CM

## 2020-02-10 DIAGNOSIS — Z17.0 MALIGNANT NEOPLASM OF RIGHT BREAST IN FEMALE, ESTROGEN RECEPTOR POSITIVE, UNSPECIFIED SITE OF BREAST (HCC): Primary | ICD-10-CM

## 2020-02-10 DIAGNOSIS — Z17.0 MALIGNANT NEOPLASM OF RIGHT BREAST IN FEMALE, ESTROGEN RECEPTOR POSITIVE, UNSPECIFIED SITE OF BREAST (HCC): ICD-10-CM

## 2020-02-10 DIAGNOSIS — C50.911 MALIGNANT NEOPLASM OF RIGHT BREAST IN FEMALE, ESTROGEN RECEPTOR POSITIVE, UNSPECIFIED SITE OF BREAST (HCC): Primary | ICD-10-CM

## 2020-02-10 LAB
ALBUMIN SERPL-MCNC: 4.3 G/DL (ref 3.5–5.2)
ALBUMIN/GLOB SERPL: 1.7 G/DL
ALP SERPL-CCNC: 118 U/L (ref 39–117)
ALT SERPL W P-5'-P-CCNC: 28 U/L (ref 1–33)
ANION GAP SERPL CALCULATED.3IONS-SCNC: 14 MMOL/L (ref 5–15)
AST SERPL-CCNC: 22 U/L (ref 1–32)
BASOPHILS # BLD AUTO: 0.01 10*3/MM3 (ref 0–0.2)
BASOPHILS NFR BLD AUTO: 0.1 % (ref 0–1.5)
BILIRUB SERPL-MCNC: 0.2 MG/DL (ref 0.2–1.2)
BUN BLD-MCNC: 20 MG/DL (ref 8–23)
BUN/CREAT SERPL: 25 (ref 7–25)
CALCIUM SPEC-SCNC: 9.9 MG/DL (ref 8.6–10.5)
CHLORIDE SERPL-SCNC: 101 MMOL/L (ref 98–107)
CHOLEST SERPL-MCNC: 150 MG/DL (ref 0–200)
CO2 SERPL-SCNC: 27 MMOL/L (ref 22–29)
CREAT BLD-MCNC: 0.8 MG/DL (ref 0.57–1)
DEPRECATED RDW RBC AUTO: 45.5 FL (ref 37–54)
EOSINOPHIL # BLD AUTO: 0.11 10*3/MM3 (ref 0–0.4)
EOSINOPHIL NFR BLD AUTO: 1.4 % (ref 0.3–6.2)
ERYTHROCYTE [DISTWIDTH] IN BLOOD BY AUTOMATED COUNT: 14.9 % (ref 12.3–15.4)
GFR SERPL CREATININE-BSD FRML MDRD: 73 ML/MIN/1.73
GLOBULIN UR ELPH-MCNC: 2.5 GM/DL
GLUCOSE BLD-MCNC: 147 MG/DL (ref 65–99)
HCT VFR BLD AUTO: 39 % (ref 34–46.6)
HDLC SERPL-MCNC: 45 MG/DL (ref 40–60)
HGB BLD-MCNC: 12.9 G/DL (ref 12–15.9)
IGA1 MFR SER: 107 MG/DL (ref 70–400)
IGG1 SER-MCNC: 559 MG/DL (ref 700–1600)
IGM SERPL-MCNC: 180 MG/DL (ref 40–230)
LDLC SERPL CALC-MCNC: 62 MG/DL (ref 0–100)
LDLC/HDLC SERPL: 1.37 {RATIO}
LYMPHOCYTES # BLD AUTO: 2.77 10*3/MM3 (ref 0.7–3.1)
LYMPHOCYTES NFR BLD AUTO: 35.3 % (ref 19.6–45.3)
MCH RBC QN AUTO: 28.5 PG (ref 26.6–33)
MCHC RBC AUTO-ENTMCNC: 33.1 G/DL (ref 31.5–35.7)
MCV RBC AUTO: 86.1 FL (ref 79–97)
MONOCYTES # BLD AUTO: 0.67 10*3/MM3 (ref 0.1–0.9)
MONOCYTES NFR BLD AUTO: 8.5 % (ref 5–12)
NEUTROPHILS # BLD AUTO: 4.28 10*3/MM3 (ref 1.7–7)
NEUTROPHILS NFR BLD AUTO: 54.7 % (ref 42.7–76)
PLATELET # BLD AUTO: 217 10*3/MM3 (ref 140–450)
PMV BLD AUTO: 11.1 FL (ref 6–12)
POTASSIUM BLD-SCNC: 4.1 MMOL/L (ref 3.5–5.2)
PROT SERPL-MCNC: 6.8 G/DL (ref 6–8.5)
RBC # BLD AUTO: 4.53 10*6/MM3 (ref 3.77–5.28)
SODIUM BLD-SCNC: 142 MMOL/L (ref 136–145)
TRIGL SERPL-MCNC: 217 MG/DL (ref 0–150)
VLDLC SERPL-MCNC: 43.4 MG/DL
WBC NRBC COR # BLD: 7.84 10*3/MM3 (ref 3.4–10.8)

## 2020-02-10 PROCEDURE — 85025 COMPLETE CBC W/AUTO DIFF WBC: CPT

## 2020-02-10 PROCEDURE — 82784 ASSAY IGA/IGD/IGG/IGM EACH: CPT | Performed by: INTERNAL MEDICINE

## 2020-02-10 PROCEDURE — 36415 COLL VENOUS BLD VENIPUNCTURE: CPT

## 2020-02-10 PROCEDURE — 99214 OFFICE O/P EST MOD 30 MIN: CPT | Performed by: INTERNAL MEDICINE

## 2020-02-10 PROCEDURE — 80053 COMPREHEN METABOLIC PANEL: CPT | Performed by: INTERNAL MEDICINE

## 2020-02-10 PROCEDURE — 80061 LIPID PANEL: CPT | Performed by: INTERNAL MEDICINE

## 2020-02-10 RX ORDER — HYDROCODONE BITARTRATE AND ACETAMINOPHEN 5; 325 MG/1; MG/1
TABLET ORAL
COMMUNITY
Start: 2019-11-14 | End: 2020-06-25

## 2020-02-11 LAB
ALBUMIN SERPL-MCNC: 3.6 G/DL (ref 2.9–4.4)
ALBUMIN/GLOB SERPL: 1.3 {RATIO} (ref 0.7–1.7)
ALPHA1 GLOB FLD ELPH-MCNC: 0.3 G/DL (ref 0–0.4)
ALPHA2 GLOB SERPL ELPH-MCNC: 0.8 G/DL (ref 0.4–1)
B-GLOBULIN SERPL ELPH-MCNC: 1.1 G/DL (ref 0.7–1.3)
GAMMA GLOB SERPL ELPH-MCNC: 0.6 G/DL (ref 0.4–1.8)
GLOBULIN SER CALC-MCNC: 2.8 G/DL (ref 2.2–3.9)
IGA SERPL-MCNC: 119 MG/DL (ref 87–352)
IGG SERPL-MCNC: 564 MG/DL (ref 700–1600)
IGM SERPL-MCNC: 185 MG/DL (ref 26–217)
KAPPA LC SERPL-MCNC: 13.6 MG/L (ref 3.3–19.4)
KAPPA LC/LAMBDA SER: 0.98 {RATIO} (ref 0.26–1.65)
LAMBDA LC FREE SERPL-MCNC: 13.9 MG/L (ref 5.7–26.3)
Lab: NORMAL
M-SPIKE: NORMAL G/DL
PROT PATTERN SERPL IFE-IMP: ABNORMAL
PROT SERPL-MCNC: 6.4 G/DL (ref 6–8.5)

## 2020-02-18 RX ORDER — MELOXICAM 15 MG/1
TABLET ORAL
Qty: 90 TABLET | Refills: 0 | Status: SHIPPED | OUTPATIENT
Start: 2020-02-18 | End: 2020-06-01

## 2020-02-19 ENCOUNTER — HOSPITAL ENCOUNTER (OUTPATIENT)
Dept: MRI IMAGING | Facility: HOSPITAL | Age: 60
Discharge: HOME | End: 2020-02-19
Admitting: NURSE PRACTITIONER

## 2020-02-19 DIAGNOSIS — C50.911 MALIGNANT NEOPLASM OF RIGHT BREAST IN FEMALE, ESTROGEN RECEPTOR POSITIVE, UNSPECIFIED SITE OF BREAST (HCC): ICD-10-CM

## 2020-02-19 DIAGNOSIS — Z17.0 MALIGNANT NEOPLASM OF RIGHT BREAST IN FEMALE, ESTROGEN RECEPTOR POSITIVE, UNSPECIFIED SITE OF BREAST (HCC): ICD-10-CM

## 2020-02-19 PROCEDURE — A9579 GAD-BASE MR CONTRAST NOS,1ML: HCPCS | Performed by: NURSE PRACTITIONER

## 2020-02-19 PROCEDURE — 25010000002 GADOTERIDOL PER 1 ML: Performed by: NURSE PRACTITIONER

## 2020-02-19 PROCEDURE — 77049 MRI BREAST C-+ W/CAD BI: CPT

## 2020-02-19 RX ADMIN — GADOTERIDOL 20 ML: 279.3 INJECTION, SOLUTION INTRAVENOUS at 16:40

## 2020-03-16 DIAGNOSIS — C50.911 MALIGNANT NEOPLASM OF RIGHT FEMALE BREAST, UNSPECIFIED ESTROGEN RECEPTOR STATUS, UNSPECIFIED SITE OF BREAST (HCC): Primary | ICD-10-CM

## 2020-03-16 RX ORDER — EXEMESTANE 25 MG/1
25 TABLET ORAL DAILY
Qty: 90 TABLET | Refills: 1 | Status: SHIPPED | OUTPATIENT
Start: 2020-03-16 | End: 2021-06-24 | Stop reason: SDUPTHER

## 2020-03-16 RX ORDER — EXEMESTANE 25 MG/1
TABLET ORAL
OUTPATIENT
Start: 2020-03-16

## 2020-03-17 ENCOUNTER — PATIENT MESSAGE (OUTPATIENT)
Dept: ONCOLOGY | Facility: CLINIC | Age: 60
End: 2020-03-17

## 2020-03-18 ENCOUNTER — TELEPHONE (OUTPATIENT)
Dept: FAMILY MEDICINE CLINIC | Facility: CLINIC | Age: 60
End: 2020-03-18

## 2020-03-18 RX ORDER — ATORVASTATIN CALCIUM 20 MG/1
20 TABLET, FILM COATED ORAL DAILY
Qty: 90 TABLET | Refills: 1 | Status: SHIPPED | OUTPATIENT
Start: 2020-03-18 | End: 2020-12-14

## 2020-03-18 NOTE — TELEPHONE ENCOUNTER
----- Message from Sindy Martin sent at 3/17/2020  5:37 PM EDT -----  Regarding: Prescription Question  Contact: [phone]  I received notice from [facility] pharmacy that they could not issue a refill on my Atorvastatin because they couldn't get an approval from Dr. aCli.  They said they've requested a new prescription several times but received no response.  I'm sure this is due to the COVID 19 epidemic.  If you could issue a new script to [facility] for the Atorvastatin at your earliest convenience, I would appreciate it.  Thank you.    Sindy Martin

## 2020-05-20 RX ORDER — LEVOTHYROXINE SODIUM 50 MCG
TABLET ORAL
Qty: 90 TABLET | Refills: 3 | Status: SHIPPED | OUTPATIENT
Start: 2020-05-20 | End: 2021-05-04

## 2020-06-01 RX ORDER — MELOXICAM 15 MG/1
TABLET ORAL
Qty: 90 TABLET | Refills: 0 | Status: SHIPPED | OUTPATIENT
Start: 2020-06-01 | End: 2020-07-07

## 2020-06-25 ENCOUNTER — OFFICE VISIT (OUTPATIENT)
Dept: FAMILY MEDICINE CLINIC | Facility: CLINIC | Age: 60
End: 2020-06-25

## 2020-06-25 VITALS
DIASTOLIC BLOOD PRESSURE: 82 MMHG | OXYGEN SATURATION: 98 % | HEIGHT: 61 IN | RESPIRATION RATE: 16 BRPM | WEIGHT: 212 LBS | SYSTOLIC BLOOD PRESSURE: 140 MMHG | TEMPERATURE: 96.9 F | BODY MASS INDEX: 40.02 KG/M2 | HEART RATE: 107 BPM

## 2020-06-25 DIAGNOSIS — M70.61 TROCHANTERIC BURSITIS OF RIGHT HIP: Primary | ICD-10-CM

## 2020-06-25 DIAGNOSIS — M76.01 GLUTEAL TENDONITIS OF RIGHT BUTTOCK: ICD-10-CM

## 2020-06-25 PROBLEM — I95.1 ORTHOSTATIC HYPOTENSION: Status: ACTIVE | Noted: 2017-03-13

## 2020-06-25 PROBLEM — M54.12 CERVICAL RADICULOPATHY: Status: ACTIVE | Noted: 2017-01-18

## 2020-06-25 PROBLEM — M75.52 BURSITIS OF LEFT SHOULDER: Status: ACTIVE | Noted: 2017-06-21

## 2020-06-25 PROBLEM — R55 VASOVAGAL SYNCOPE: Status: ACTIVE | Noted: 2017-01-18

## 2020-06-25 PROCEDURE — 99213 OFFICE O/P EST LOW 20 MIN: CPT | Performed by: FAMILY MEDICINE

## 2020-06-25 RX ORDER — LORATADINE 10 MG/1
10 TABLET ORAL DAILY
COMMUNITY
Start: 2012-04-01

## 2020-07-07 RX ORDER — MELOXICAM 15 MG/1
TABLET ORAL
Qty: 90 TABLET | Refills: 0 | Status: SHIPPED | OUTPATIENT
Start: 2020-07-07 | End: 2020-10-13

## 2020-07-19 ENCOUNTER — E-VISIT (OUTPATIENT)
Dept: FAMILY MEDICINE CLINIC | Facility: CLINIC | Age: 60
End: 2020-07-19

## 2020-07-19 DIAGNOSIS — N30.00 ACUTE CYSTITIS WITHOUT HEMATURIA: Primary | ICD-10-CM

## 2020-07-19 PROBLEM — M76.01 GLUTEAL TENDONITIS OF RIGHT BUTTOCK: Status: ACTIVE | Noted: 2020-07-19

## 2020-07-19 PROBLEM — M70.61 TROCHANTERIC BURSITIS OF RIGHT HIP: Status: ACTIVE | Noted: 2020-07-19

## 2020-07-19 PROCEDURE — 99213 OFFICE O/P EST LOW 20 MIN: CPT | Performed by: FAMILY MEDICINE

## 2020-07-19 NOTE — PATIENT INSTRUCTIONS
Hip Bursitis    Hip bursitis is inflammation of a fluid-filled sac (bursa) in the hip joint. The bursa prevents the bones in the hip joint from rubbing against each other. Hip bursitis can cause mild to moderate pain, and symptoms often come and go over time.  What are the causes?  This condition may be caused by:  · Injury to the hip.  · Overuse of the muscles that surround the hip joint.  · Previous injury or surgery of the hip.  · Arthritis or gout.  · Diabetes.  · Thyroid disease.  · Infection.  In some cases, the cause may not be known.  What are the signs or symptoms?  Symptoms of this condition include:  · Mild or moderate pain in the hip area. Pain may get worse with movement.  · Tenderness and swelling of the hip, especially on the outer side of the hip.  · In rare cases, the bursa may become infected. This may cause a fever, as well as warmth and redness in the area.  Symptoms may come and go.  How is this diagnosed?  This condition may be diagnosed based on:  · A physical exam.  · Your medical history.  · X-rays.  · Removal of fluid from your inflamed bursa for testing (biopsy).  You may be sent to a health care provider who specializes in bone diseases (orthopedist) or a provider who specializes in joint inflammation (rheumatologist).  How is this treated?  This condition is treated by resting, icing, applying pressure (compression), and raising (elevating) the injured area. This is called RICE treatment. In some cases, this may be enough to make your symptoms go away. Treatment may also include:  · Using crutches.  · Draining fluid out of the bursa to help relieve swelling.  · Injecting medicine that helps to reduce inflammation (cortisone).  · Additional medicines if the bursa is infected.  Follow these instructions at home:  Managing pain, stiffness, and swelling    · If directed, put ice on the painful area.  ? Put ice in a plastic bag.  ? Place a towel between your skin and the bag.  ? Leave the ice  on for 20 minutes, 2-3 times a day.  ? Raise (elevate) your hip above the level of your heart as much as you can without pain. To do this, try putting a pillow under your hips while you lie down.  Activity  · Return to your normal activities as told by your health care provider. Ask your health care provider what activities are safe for you.  · Rest and protect your hip as much as possible until your pain and swelling get better.  General instructions  · Take over-the-counter and prescription medicines only as told by your health care provider.  · Wear compression wraps only as told by your health care provider.  · Do not use your hip to support your body weight until your health care provider says that you can. Use crutches as told by your health care provider.  · Gently massage and stretch your injured area as often as is comfortable.  · Keep all follow-up visits as told by your health care provider. This is important.  How is this prevented?  · Exercise regularly, as told by your health care provider.  · Warm up and stretch before being active.  · Cool down and stretch after being active.  · If an activity irritates your hip or causes pain, avoid the activity as much as possible.  · Avoid sitting down for long periods at a time.  Contact a health care provider if you:  · Have a fever.  · Develop new symptoms.  · Have difficulty walking or doing everyday activities.  · Have pain that gets worse or does not get better with medicine.  · Develop red skin or a feeling of warmth in your hip area.  Get help right away if you:  · Cannot move your hip.  · Have severe pain.  Summary  · Hip bursitis is inflammation of a fluid-filled sac (bursa) in the hip joint.  · Hip bursitis can cause mild to moderate pain, and symptoms often come and go over time.  · This condition is treated with rest, ice, compression, elevation, and medicines.  This information is not intended to replace advice given to you by your health care  provider. Make sure you discuss any questions you have with your health care provider.  Document Released: 06/09/2003 Document Revised: 08/26/2019 Document Reviewed: 08/26/2019  Elsevier Patient Education © 2020 [employer]

## 2020-07-19 NOTE — ASSESSMENT & PLAN NOTE
Patient has trochanteric bursitis with evidence of right gluteal tendinitis.  She is uncertain how she injured this about a month ago but it is getting progressively worse.  I gave her some stretches to do and she will see how that goes.  She may have to return for injections into the gluteal bursitis.

## 2020-07-19 NOTE — ASSESSMENT & PLAN NOTE
Patient is tender over the right gluteal muscle and is extension to the right trochanteric area.  She was given some stretches specifically designed for this muscle and tendon and hopefully she can work this out before we would have to give her any shots in the trochanteric bursa area where it attaches.  Ice and stretches over the treatment of choice

## 2020-07-20 PROBLEM — N30.00 ACUTE CYSTITIS WITHOUT HEMATURIA: Status: ACTIVE | Noted: 2020-07-20

## 2020-07-20 RX ORDER — SULFAMETHOXAZOLE AND TRIMETHOPRIM 800; 160 MG/1; MG/1
1 TABLET ORAL 2 TIMES DAILY
Qty: 14 TABLET | Refills: 1 | Status: SHIPPED | OUTPATIENT
Start: 2020-07-20 | End: 2020-07-27

## 2020-07-20 NOTE — ASSESSMENT & PLAN NOTE
Patient has a urinary tract infection.  We will start Septra DS 1 twice daily for 7 days.  She is to drink more fluids and to let us know if she is not better in a few days.

## 2020-07-20 NOTE — PROGRESS NOTES
Patient is a 59-year-old female who has had urinary frequency and dysuria for about 10 days.  She initially thought maybe she had a yeast infection and treated it with over-the-counter medications.  She subsequently developed back pain and some chills at times with fevers up to 99 degrees.  She has no nausea or vomiting and no intense back pain.  She called requesting an ED visit which was given.  By history this is a urinary tract infection and we will treat accordingly.  If she is not better within 5 to 7 days then she is going to need to be seen and cultured.      Impression and plan:   UTI/cystitis  Therapy will consist of Septra DS 1 twice daily for 7 days.  Patient will push fluids and call if symptoms get worse

## 2020-08-05 NOTE — PROGRESS NOTES
Hematology/Oncology Outpatient Follow Up    PATIENT NAME:Sindy Martin  :1960  MRN: [id number]  PRIMARY CARE PHYSICIAN: Rubens Cali MD  REFERRING PHYSICIAN: Rubens Cali MD    Chief Complaint   Patient presents with   • Follow-up     breast cancer        HISTORY OF PRESENT ILLNESS:   This is a 59-year-old female who was originally seen on 14 after she presented with early stage right breast cancer.  Please refer to the details of my notes for more information.   • She had an abnormal mammogram in May 2014.    • 6/10/14 - She underwent ultrasound guided localization of the right breast mass with sentinel lymph node injection, right lumpectomy.  Pathology revealed one sentinel lymph node negative for malignancy.  Tumor was strongly positive for ER, MA and HER-2/hiro was negative.  Tumor measured 1 cm in greatest dimension.  It was a grade III disease.  All the margins were negative.  Pathologic stage is [id number].    • 14 - Patient’s tumor was sent for Oncotype DX assay.  This returned with a recurrent score and validation study, she has a 12% chance of distant recurrence despite five years of Tamoxifen over the next 10 years.  On the validation study her ER was positive, MA was positive and HER-2/hiro was negative.  • 14 - Patient had BRCA 1 and 2 mutational analysis which returned with BRCA 2 deleterious mutation involving [id number] on the BRCA 2 sequencing gene.    • 2014 - She had FSH and serum estradiol levels, which are noted to be in the postmenopausal range.    • 14 - Patient was initiated on adjuvant chemotherapy with Taxotere and Cytoxan along with Neulasta.     • 10/6/14 - Patient completed 4th cycle of chemotherapy with Cytoxan and Taxotere.   • Patient was seen by Dr. Buck who performed excisional biopsy of a mole on the left forearm and this was benign from history.     • 14 - Patient completed whole right breast radiation under the direction of   Alcides.  She received total dose of 4256 cGy.  • Patient has been seen by Dr. Son who will perform her complete hysterectomy due to BRCA positivity after the first of the year.      • 12/23/14 - Patient had a bone density, which was essentially normal.    • December 2014 - Patient was initiated on Arimidex 1 mg every day as well as Os-Quinton D.   • April 2015 - Patient underwent total robotic hysterectomy with bilateral adnexectomy.  Pathology was benign.  I have requested for official report of the above procedure and path reports.  Patient has been seen by Dr. Son on 4/29/15.    • 5/19/15 - Port catheter was removed.   • 5/28/15 - Bilateral diagnostic mammogram which was essentially unremarkable except that patient has heterogenously dense breasts.  • 6/22/15 - Patient was seen by Dr. Guo.  Follow up in one year was recommended.    • Fasting lipid profile done which showed total cholesterol of 234, HDL of 42, triglyceride 237, elevated, and LDH was 145, also elevated.  BUN 20, creatinine 0.87 and LFTs were essentially unremarkable.  WBC 6.7, hemoglobin 12.4 and platelet count 197,000.    • 12/5/15 - Fasting lipid profile showed total cholesterol 133, HDL 50 and LDL 67, triglyceride 81.  BUN 22, creatinine 0.7.  • 2/24/16 - Bilateral breast MRI which showed post-op scar change on the right breast, but no MR finding to suggest malignancy.  She has a 4 mm enhancing lesion in the lower right breast at the inframammary fold.  Patient’s breast exam and skin evaluation was essentially unremarkable at the site where the 4 mm enhancing lesion was noted.  Patient also had no specific complaints.  • 3/22/16 - Anemia work up with reticulocyte count (N), B12 (N) 521, ferritin (N) 43, folate 20, haptoglobin 184, , BUN 11, creatinine 11.8.  SPEP with immunofixation assay showed IgG kappa monoclonal protein with M spike of 0.2 gm/dL.  Erythropoietin level (N) 22 [range 2-18].    • 3/31/16 - Quantitative  immunoglobulins for IgA, IgG and IgM were normal.  Serum free light chains were essentially unremarkable.    • 4/5/16 - 24-hour urine electrophoresis which did not show any evidence of monoclonal gammopathy.   • 6/10/16 - Bilateral mammogram was essentially normal.  Follow up in one year was recommended.    • 6/20/16 - Serum transferrin receptor assay returned with high level at 5.2 [range 1.9-4.9].    • 6/27/16 - Urinalysis showed trace blood.  • 8/1/16 - Patient was seen by Dr. Torres and had EGD and colonoscopy.  EGD showed hiatal hernia, moderate to severe gastroduodenitis, but no ulcers seen.  No abnormalities seen on her colonoscopy.   • 9/12/16 - Chemistry panel:  BUN 14, creatinine 0.8, total protein (L) 5.8, alkaline phosphatase 94 [range 32-91].  Quantitative immunoglobulins for IgA (N) 101, IgG slightly low at 530 [range 600-1500], IgM (N) 195.  SPEP with immunofixation assay was essentially negative for any significant monoclonal protein.   • 9/19/16 - Urinalysis was negative for hematuria.   • 10/12/16 - Bone scan showed degenerative changes in the knees, otherwise negative.  No signs of metastatic disease.    • 10/10 & 10/17/16 - Patient received IV Injectafer.    • 1/16/17 - SPEP with immunofixation assay which did not show any evidence of monoclonal protein.   • 2/9/17 - Bilateral breast MRI showed no evidence of malignancy, but she had a skin lesion noted on the left breast.  Patient has since then seen Dr. Buck.  She has a follow up appointment in six months.    • 6/26/17 - Patient had bilateral diagnostic mammogram with tomosynthesis.  This revealed new linear pleomorphic microcalcifications in the medial right breast.  The left breast was unchanged.  Stereotactic biopsy was recommended.    • 7/12/17 - Patient underwent stereotactic biopsy of the right breast calcifications.  Pathology showed fibrous mastopathy with coarse stromal microcalcifications suggestive of previous procedure.  Negative for  carcinoma in situ and invasive carcinoma.  The result was concurrent with the image findings.  Follow up in one year was recommended.    • 9/11/17 - Since her last visit patient had labs including SPEP with immunofixation assay which showed a free lambda light chain identified.  IgA was 83.  IgG was 460.  IgM was 158.  BUN 17.  Creatinine 0.6.  Alkaline phosphatase 101.  Serum calcium 9.2.  CMP was normal.    • 9/16/17 - DEXA scan was normal.    • 2/15/18 - Bilateral breast MRI with stable post-op changes seen at the right breast, but no mass was seen in either the right or left breast.  Bilateral breast mammogram is due June 2018.   • 3/12/18 - CBC:  WBC 7.9, hemoglobin 13, platelet count 183,000.  Chemistry panel:  BUN 19.  Creatinine 0.7.  Alkaline phosphatase 92.  IgA 511.  IgG low at 518.  IgM normal 191.  Kappa lambda free light chains were normal.  SPEP with LAAZRO was normal.    • 7/30/18 - Bilateral diagnostic mammogram with 3D with no mammographic evidence of malignancy.    • 9/18/18 - CT scan of the abdomen and pelvis which showed post-op changes, but no masses were identified in the abdomen.    • 2/4/19 - SPEP with LAZARO which did not show any monoclonal protein.  Total cholesterol was 116, triglycerides 69, LDL 66, HDL 43.  Chemistry:  BUN 17, creatinine 0.9, liver function tests are normal.    · 2/22/2019-patient had bilateral breast MRI, there was no MR evidence of malignancy in either breast.  · 8/29/19-patient had a DEXA scan, this was normal bone mineral density  · 8/29/2019-patient had bilateral mammogram which showed heterogeneously dense breast tissue.  Scar tissue was noted on the right breast consistent with previous surgery.  Follow-up in 1 year was recommended  · 2/19/2020 patient had bilateral breast MRI which was normal, follow-up in 1 year was recommended  · She is here today for routine follow-up visit.  Patient does not have any breast specific complaints today    Past Medical History:    Diagnosis Date   • Hypertension        Past Surgical History:   Procedure Laterality Date   • APPENDECTOMY     • BREAST BIOPSY     • BREAST LUMPECTOMY     • CARPAL TUNNEL RELEASE     • CHOLECYSTECTOMY     • HYSTERECTOMY     • OOPHORECTOMY           Current Outpatient Medications:   •  aspirin 81 MG tablet, ASPIRIN 81 MG ORAL TABLET, Disp: , Rfl:   •  atorvastatin (LIPITOR) 20 MG tablet, Take 1 tablet by mouth Daily., Disp: 90 tablet, Rfl: 1  •  buPROPion XL (WELLBUTRIN XL) 150 MG 24 hr tablet, , Disp: , Rfl:   •  Calcium-Magnesium-Vitamin D - MG-MG-UNIT tablet sustained-release 24 hour, Take 1 capsule by mouth Daily., Disp: , Rfl:   •  escitalopram (LEXAPRO) 20 MG tablet, , Disp: , Rfl:   •  exemestane (AROMASIN) 25 MG chemo tablet, Take 1 tablet by mouth Daily., Disp: 90 tablet, Rfl: 1  •  loratadine (Claritin) 10 MG tablet, , Disp: , Rfl:   •  Magnesium 250 MG tablet, Take 500 mg by mouth Daily., Disp: , Rfl:   •  Melatonin 3 MG capsule, Take  by mouth., Disp: , Rfl:   •  meloxicam (MOBIC) 15 MG tablet, TAKE 1 TABLET BY MOUTH DAILY, Disp: 90 tablet, Rfl: 0  •  metFORMIN (GLUCOPHAGE) 500 MG tablet, TAKE 2 TABLETS TWICE A DAY, Disp: 360 tablet, Rfl: 3  •  Naproxen Sodium 220 MG capsule, Take 2 tablets by mouth., Disp: , Rfl:   •  NON FORMULARY, OSTEOBIFLEX 1 TAB DAILY, Disp: , Rfl:   •  pantoprazole (PROTONIX) 40 MG EC tablet, , Disp: , Rfl:   •  SYNTHROID 50 MCG tablet, TAKE 1 TABLET DAILY, Disp: 90 tablet, Rfl: 3    Allergies   Allergen Reactions   • Codeine Nausea And Vomiting   • Ibuprofen Anaphylaxis       Family History   Problem Relation Age of Onset   • Lung cancer Maternal Aunt    • Uterine cancer Paternal Aunt 60   • Prostate cancer Paternal Uncle    • Melanoma Paternal Grandmother    • Prostate cancer Paternal Cousin    • Melanoma Paternal Cousin    • Esophageal cancer Paternal Cousin    • Thyroid cancer Maternal Cousin 40   • Esophageal cancer Maternal Cousin    • Ovarian cancer Maternal  Cousin 50       Cancer-related family history includes Esophageal cancer in her maternal cousin and paternal cousin; Lung cancer in her maternal aunt; Melanoma in her paternal cousin and paternal grandmother; Ovarian cancer (age of onset: 50) in her maternal cousin; Prostate cancer in her paternal cousin and paternal uncle; Thyroid cancer (age of onset: 40) in her maternal cousin; Uterine cancer (age of onset: 60) in her paternal aunt.    Social History     Tobacco Use   • Smoking status: Never Smoker   • Smokeless tobacco: Never Used   Substance Use Topics   • Alcohol use: Yes     Frequency: Monthly or less     Comment: occasionally   • Drug use: Never       I have reviewed and confirmed the accuracy of the patient's history: Chief complaint, HPI and ROS as entered by the MA/LPN/RN. Munira Finney MD 08/10/20     SUBJECTIVE:      The patient is here for a follow up appointment.  She denies any lumps, nipple discharge, and skin discoloration in her breasts.  She feels well overall and has not had any recent admissions to the hospital.          REVIEW OF SYSTEMS:  Review of Systems   Constitutional: Negative for chills and fever.   HENT: Negative for ear pain, mouth sores, nosebleeds and sore throat.    Eyes: Negative for photophobia and visual disturbance.   Respiratory: Negative for wheezing and stridor.    Cardiovascular: Negative for chest pain and palpitations.   Gastrointestinal: Negative for abdominal pain, diarrhea, nausea and vomiting.   Endocrine: Negative for cold intolerance and heat intolerance.   Genitourinary: Negative for dysuria and hematuria.   Musculoskeletal: Negative for joint swelling and neck stiffness.   Skin: Negative for color change and rash.   Neurological: Negative for seizures and syncope.   Hematological: Negative for adenopathy.        No obvious bleeding   Psychiatric/Behavioral: Negative for agitation, confusion and hallucinations.     ROS as documented  "above    OBJECTIVE:    Vitals:    08/10/20 1131   BP: 178/83   Pulse: 109   Resp: 18   Temp: 96.8 °F (36 °C)   TempSrc: Oral   Weight: 97.1 kg (214 lb)   Height: 154.9 cm (61\")   PainSc: 0-No pain       ECOG  (1) Restricted in physically strenuous activity, ambulatory and able to do work of light nature    Physical Exam   Constitutional: She is oriented to person, place, and time. No distress.   HENT:   Head: Normocephalic and atraumatic.   Eyes: Conjunctivae and EOM are normal. Right eye exhibits no discharge. Left eye exhibits no discharge. No scleral icterus.   Neck: Normal range of motion. Neck supple. No thyromegaly present.   Cardiovascular: Normal rate, regular rhythm and normal heart sounds. Exam reveals no gallop and no friction rub.   Pulmonary/Chest: Effort normal. No stridor. No respiratory distress. She has no wheezes. Right breast exhibits no inverted nipple, no mass, no nipple discharge, no skin change and no tenderness. Left breast exhibits no inverted nipple, no mass, no nipple discharge, no skin change and no tenderness. No breast bleeding. Breasts are symmetrical.   Bilateral breast exam is negative  Bilateral axillary exam is negative   Abdominal: Soft. Bowel sounds are normal. She exhibits no mass. There is no tenderness. There is no rebound and no guarding.   Musculoskeletal: Normal range of motion. She exhibits no tenderness.   Lymphadenopathy:     She has no cervical adenopathy.   Neurological: She is alert and oriented to person, place, and time. She exhibits normal muscle tone.   Skin: Skin is warm. No rash noted. She is not diaphoretic. No erythema.   Psychiatric: She has a normal mood and affect. Her behavior is normal.   Nursing note and vitals reviewed.    Physical exam as documented above    RECENT LABS  WBC   Date Value Ref Range Status   08/10/2020 9.41 3.40 - 10.80 10*3/mm3 Final     RBC   Date Value Ref Range Status   08/10/2020 4.69 3.77 - 5.28 10*6/mm3 Final     Hemoglobin   Date " Value Ref Range Status   08/10/2020 13.3 12.0 - 15.9 g/dL Final     Hematocrit   Date Value Ref Range Status   08/10/2020 40.2 34.0 - 46.6 % Final     MCV   Date Value Ref Range Status   08/10/2020 85.7 79.0 - 97.0 fL Final     MCH   Date Value Ref Range Status   08/10/2020 28.4 26.6 - 33.0 pg Final     MCHC   Date Value Ref Range Status   08/10/2020 33.1 31.5 - 35.7 g/dL Final     RDW   Date Value Ref Range Status   08/10/2020 14.9 12.3 - 15.4 % Final     RDW-SD   Date Value Ref Range Status   08/10/2020 45.5 37.0 - 54.0 fl Final     MPV   Date Value Ref Range Status   08/10/2020 11.2 6.0 - 12.0 fL Final     Platelets   Date Value Ref Range Status   08/10/2020 202 140 - 450 10*3/mm3 Final     Neutrophil %   Date Value Ref Range Status   08/10/2020 55.8 42.7 - 76.0 % Final     Lymphocyte %   Date Value Ref Range Status   08/10/2020 33.5 19.6 - 45.3 % Final     Monocyte %   Date Value Ref Range Status   08/10/2020 9.2 5.0 - 12.0 % Final     Eosinophil %   Date Value Ref Range Status   08/10/2020 1.3 0.3 - 6.2 % Final     Basophil %   Date Value Ref Range Status   08/10/2020 0.2 0.0 - 1.5 % Final     Neutrophils, Absolute   Date Value Ref Range Status   08/10/2020 5.25 1.70 - 7.00 10*3/mm3 Final     Lymphocytes, Absolute   Date Value Ref Range Status   08/10/2020 3.15 (H) 0.70 - 3.10 10*3/mm3 Final     Monocytes, Absolute   Date Value Ref Range Status   08/10/2020 0.87 0.10 - 0.90 10*3/mm3 Final     Eosinophils, Absolute   Date Value Ref Range Status   08/10/2020 0.12 0.00 - 0.40 10*3/mm3 Final     Basophils, Absolute   Date Value Ref Range Status   08/10/2020 0.02 0.00 - 0.20 10*3/mm3 Final       Lab Results   Component Value Date    GLUCOSE 103 (H) 08/10/2020    BUN  08/10/2020      Comment:      Testing performed by alternate method    CREATININE 0.85 08/10/2020    EGFRIFNONA 68 08/10/2020    EGFRIFAFRI 85 11/10/2016    BCR  08/10/2020      Comment:      Testing not performed    K 4.4 08/10/2020    CO2 24.0  08/10/2020    CALCIUM 9.8 08/10/2020    PROTENTOTREF 6.4 02/10/2020    ALBUMIN 4.40 08/10/2020    LABIL2 1.3 02/10/2020    AST 22 08/10/2020    ALT 23 08/10/2020         Assessment/Plan     Malignant neoplasm of right female breast, unspecified estrogen receptor status, unspecified site of breast (CMS/[facility])  - CBC & Differential  - IgA  - IgG  - IgM  - Comprehensive Metabolic Panel  - Immunofixation, Serum  - Immunoglobulin Free LT Chains Blood  - Protein Electrophoresis, Total  - Mammo Screening Digital Tomosynthesis Bilateral With CAD  - BUN  - BUN    Monoclonal gammopathy  - CBC & Differential  - IgA  - IgG  - IgM  - Comprehensive Metabolic Panel  - Immunofixation, Serum  - Immunoglobulin Free LT Chains Blood  - Protein Electrophoresis, Total  - BUN  - BUN      ASSESSMENT:    1. T1b N0 M0 invasive ductal carcinoma, ER positive, ME positive, HER-2/hiro negative, grade III tumor.  On surveillance  2. Status post right lumpectomy with sentinel lymph node biopsy.    3. Status post adjuvant chemotherapy with Cytoxan and Taxotere for four cycles.    4. BRCA 2 sequencing gene with 3036 deletion 4 deleterious mutation.  Surveillance care  5. Status post right breast radiation.   6. Postmenopausal hormonal status.   7. Status post robotic complete hysterectomy and adnexectomy with benign findings.    8. Hypercholesterolemia on treatment.  Lipid panel 2/4/19 was normal.   9. Arimidex initiated December 2014, discontinued October 2016 due to side effects.  Aromasin initiated November 2016.  Remains on Aromasin.  Ongoing monitoring  10. Iron deficiency anemia, resolved.   11. Monoclonal gammopathy of unknown significance.  On surveillance  12. Bilateral hip pain.  Resolved    PLAN:     She will continue Aromasin, Os-Quinton D.    Patient will also be due for bone density in September 2021.   Bilateral mammogram is due now I will go ahead and schedule August 2020  She will continue monthly breast self exam and call for lumps,  nipple discharge, skin discoloration. A CMP will be done today.    Her monoclonal labs will need to be checked today as well today.    She will follow up with Dr. Torres for pancreatic cancer screening.  We discussed use of pancreatic MRI alternating with EUS.      Patient will be  due for breast MRI ,  Feb 2021    Patient is five years out from her breast cancer diagnosis.  I did discuss prophylactic mastectomies.  At this time patient wants to continue with breast conservation.  I will continue Aromasin for her for breast cancer prevention.  She has not had any significant toxicities on this treatment.             I have reviewed labs results, imaging, vitals, and medications with the patient today. Will follow up in 6 months with me.         Patient verbalized understanding and is in agreement of the above plan.

## 2020-08-10 ENCOUNTER — OFFICE VISIT (OUTPATIENT)
Dept: ONCOLOGY | Facility: CLINIC | Age: 60
End: 2020-08-10

## 2020-08-10 ENCOUNTER — LAB (OUTPATIENT)
Dept: LAB | Facility: HOSPITAL | Age: 60
End: 2020-08-10

## 2020-08-10 VITALS
RESPIRATION RATE: 18 BRPM | HEIGHT: 61 IN | HEART RATE: 109 BPM | BODY MASS INDEX: 40.4 KG/M2 | TEMPERATURE: 96.8 F | DIASTOLIC BLOOD PRESSURE: 83 MMHG | SYSTOLIC BLOOD PRESSURE: 178 MMHG | WEIGHT: 214 LBS

## 2020-08-10 DIAGNOSIS — C50.911 MALIGNANT NEOPLASM OF RIGHT FEMALE BREAST, UNSPECIFIED ESTROGEN RECEPTOR STATUS, UNSPECIFIED SITE OF BREAST (HCC): Primary | ICD-10-CM

## 2020-08-10 DIAGNOSIS — C50.911 MALIGNANT NEOPLASM OF RIGHT FEMALE BREAST, UNSPECIFIED ESTROGEN RECEPTOR STATUS, UNSPECIFIED SITE OF BREAST (HCC): ICD-10-CM

## 2020-08-10 DIAGNOSIS — D47.2 MONOCLONAL GAMMOPATHY: ICD-10-CM

## 2020-08-10 LAB
ALBUMIN SERPL-MCNC: 4.4 G/DL (ref 3.5–5.2)
ALBUMIN/GLOB SERPL: 2 G/DL
ALP SERPL-CCNC: 107 U/L (ref 39–117)
ALT SERPL W P-5'-P-CCNC: 23 U/L (ref 1–33)
ANION GAP SERPL CALCULATED.3IONS-SCNC: 15 MMOL/L (ref 5–15)
AST SERPL-CCNC: 22 U/L (ref 1–32)
BASOPHILS # BLD AUTO: 0.02 10*3/MM3 (ref 0–0.2)
BASOPHILS NFR BLD AUTO: 0.2 % (ref 0–1.5)
BILIRUB SERPL-MCNC: 0.2 MG/DL (ref 0–1.2)
BUN SERPL-MCNC: 16 MG/DL (ref 8–23)
BUN SERPL-MCNC: ABNORMAL MG/DL
BUN/CREAT SERPL: ABNORMAL
CALCIUM SPEC-SCNC: 9.8 MG/DL (ref 8.6–10.5)
CHLORIDE SERPL-SCNC: 102 MMOL/L (ref 98–107)
CO2 SERPL-SCNC: 24 MMOL/L (ref 22–29)
CREAT SERPL-MCNC: 0.85 MG/DL (ref 0.57–1)
DEPRECATED RDW RBC AUTO: 45.5 FL (ref 37–54)
EOSINOPHIL # BLD AUTO: 0.12 10*3/MM3 (ref 0–0.4)
EOSINOPHIL NFR BLD AUTO: 1.3 % (ref 0.3–6.2)
ERYTHROCYTE [DISTWIDTH] IN BLOOD BY AUTOMATED COUNT: 14.9 % (ref 12.3–15.4)
GFR SERPL CREATININE-BSD FRML MDRD: 68 ML/MIN/1.73
GLOBULIN UR ELPH-MCNC: 2.2 GM/DL
GLUCOSE SERPL-MCNC: 103 MG/DL (ref 65–99)
HCT VFR BLD AUTO: 40.2 % (ref 34–46.6)
HGB BLD-MCNC: 13.3 G/DL (ref 12–15.9)
IGA1 MFR SER: 120 MG/DL (ref 70–400)
IGG1 SER-MCNC: 597 MG/DL (ref 700–1600)
IGM SERPL-MCNC: 192 MG/DL (ref 40–230)
LYMPHOCYTES # BLD AUTO: 3.15 10*3/MM3 (ref 0.7–3.1)
LYMPHOCYTES NFR BLD AUTO: 33.5 % (ref 19.6–45.3)
MCH RBC QN AUTO: 28.4 PG (ref 26.6–33)
MCHC RBC AUTO-ENTMCNC: 33.1 G/DL (ref 31.5–35.7)
MCV RBC AUTO: 85.7 FL (ref 79–97)
MONOCYTES # BLD AUTO: 0.87 10*3/MM3 (ref 0.1–0.9)
MONOCYTES NFR BLD AUTO: 9.2 % (ref 5–12)
NEUTROPHILS NFR BLD AUTO: 5.25 10*3/MM3 (ref 1.7–7)
NEUTROPHILS NFR BLD AUTO: 55.8 % (ref 42.7–76)
PLATELET # BLD AUTO: 202 10*3/MM3 (ref 140–450)
PMV BLD AUTO: 11.2 FL (ref 6–12)
POTASSIUM SERPL-SCNC: 4.4 MMOL/L (ref 3.5–5.2)
PROT SERPL-MCNC: 6.6 G/DL (ref 6–8.5)
RBC # BLD AUTO: 4.69 10*6/MM3 (ref 3.77–5.28)
SODIUM SERPL-SCNC: 141 MMOL/L (ref 136–145)
WBC # BLD AUTO: 9.41 10*3/MM3 (ref 3.4–10.8)

## 2020-08-10 PROCEDURE — 82784 ASSAY IGA/IGD/IGG/IGM EACH: CPT | Performed by: INTERNAL MEDICINE

## 2020-08-10 PROCEDURE — 85025 COMPLETE CBC W/AUTO DIFF WBC: CPT

## 2020-08-10 PROCEDURE — 80053 COMPREHEN METABOLIC PANEL: CPT | Performed by: INTERNAL MEDICINE

## 2020-08-10 PROCEDURE — 99214 OFFICE O/P EST MOD 30 MIN: CPT | Performed by: INTERNAL MEDICINE

## 2020-08-10 PROCEDURE — 36415 COLL VENOUS BLD VENIPUNCTURE: CPT

## 2020-08-11 LAB
ALBUMIN SERPL-MCNC: 3.6 G/DL (ref 2.9–4.4)
ALBUMIN/GLOB SERPL: 1.2 {RATIO} (ref 0.7–1.7)
ALPHA1 GLOB FLD ELPH-MCNC: 0.2 G/DL (ref 0–0.4)
ALPHA2 GLOB SERPL ELPH-MCNC: 0.9 G/DL (ref 0.4–1)
B-GLOBULIN SERPL ELPH-MCNC: 1.1 G/DL (ref 0.7–1.3)
GAMMA GLOB SERPL ELPH-MCNC: 0.6 G/DL (ref 0.4–1.8)
GLOBULIN SER CALC-MCNC: 2.9 G/DL (ref 2.2–3.9)
KAPPA LC SERPL-MCNC: 16.4 MG/L (ref 3.3–19.4)
KAPPA LC/LAMBDA SER: 1.18 {RATIO} (ref 0.26–1.65)
LAMBDA LC FREE SERPL-MCNC: 13.9 MG/L (ref 5.7–26.3)
Lab: NORMAL
M-SPIKE: NORMAL G/DL
PROT SERPL-MCNC: 6.5 G/DL (ref 6–8.5)

## 2020-08-12 LAB
IGA SERPL-MCNC: 112 MG/DL (ref 87–352)
IGG SERPL-MCNC: 562 MG/DL (ref 586–1602)
IGM SERPL-MCNC: 183 MG/DL (ref 26–217)
PROT PATTERN SERPL IFE-IMP: ABNORMAL

## 2020-08-31 ENCOUNTER — HOSPITAL ENCOUNTER (OUTPATIENT)
Dept: MAMMOGRAPHY | Facility: HOSPITAL | Age: 60
Discharge: HOME | End: 2020-08-31
Admitting: INTERNAL MEDICINE

## 2020-08-31 DIAGNOSIS — C50.911 MALIGNANT NEOPLASM OF RIGHT FEMALE BREAST, UNSPECIFIED ESTROGEN RECEPTOR STATUS, UNSPECIFIED SITE OF BREAST (HCC): ICD-10-CM

## 2020-08-31 PROCEDURE — 77067 SCR MAMMO BI INCL CAD: CPT

## 2020-08-31 PROCEDURE — 77063 BREAST TOMOSYNTHESIS BI: CPT

## 2020-09-28 DIAGNOSIS — C50.911 MALIGNANT NEOPLASM OF RIGHT FEMALE BREAST, UNSPECIFIED ESTROGEN RECEPTOR STATUS, UNSPECIFIED SITE OF BREAST (HCC): ICD-10-CM

## 2020-09-28 RX ORDER — EXEMESTANE 25 MG/1
TABLET ORAL
Refills: 1 | OUTPATIENT
Start: 2020-09-28

## 2020-09-29 DIAGNOSIS — C50.911 MALIGNANT NEOPLASM OF RIGHT FEMALE BREAST, UNSPECIFIED ESTROGEN RECEPTOR STATUS, UNSPECIFIED SITE OF BREAST (HCC): Primary | ICD-10-CM

## 2020-09-29 RX ORDER — EXEMESTANE 25 MG/1
25 TABLET ORAL DAILY
Qty: 90 TABLET | Refills: 1 | Status: SHIPPED | OUTPATIENT
Start: 2020-09-29 | End: 2021-09-10

## 2020-10-13 RX ORDER — MELOXICAM 15 MG/1
TABLET ORAL
Qty: 90 TABLET | Refills: 0 | Status: SHIPPED | OUTPATIENT
Start: 2020-10-13 | End: 2021-01-13

## 2020-12-14 RX ORDER — ATORVASTATIN CALCIUM 20 MG/1
TABLET, FILM COATED ORAL
Qty: 90 TABLET | Refills: 1 | Status: SHIPPED | OUTPATIENT
Start: 2020-12-14 | End: 2021-04-05

## 2021-01-13 RX ORDER — MELOXICAM 15 MG/1
TABLET ORAL
Qty: 90 TABLET | Refills: 0 | Status: SHIPPED | OUTPATIENT
Start: 2021-01-13 | End: 2021-03-08

## 2021-02-08 NOTE — PROGRESS NOTES
Hematology/Oncology Outpatient Follow Up    PATIENT NAME:Sindy Martin  :1960  MRN: [id number]  PRIMARY CARE PHYSICIAN: Rubens Cali MD  REFERRING PHYSICIAN: Rubens Cali MD    Chief Complaint   Patient presents with   • Appointment     Malignant neoplasm of right female breast, unspecified estrogen receptor status, unspecified site of breast (CMS/HCC)   • Follow-up        HISTORY OF PRESENT ILLNESS:     This is a 60-year-old female who was originally seen on 14 after she presented with early stage right breast cancer.  Please refer to the details of my notes for more information.   • She had an abnormal mammogram in May 2014.    • 6/10/14 - She underwent ultrasound guided localization of the right breast mass with sentinel lymph node injection, right lumpectomy.  Pathology revealed one sentinel lymph node negative for malignancy.  Tumor was strongly positive for ER, AK and HER-2/hiro was negative.  Tumor measured 1 cm in greatest dimension.  It was a grade III disease.  All the margins were negative.  Pathologic stage is [id number].    • 14 - Patient’s tumor was sent for Oncotype DX assay.  This returned with a recurrent score and validation study, she has a 12% chance of distant recurrence despite five years of Tamoxifen over the next 10 years.  On the validation study her ER was positive, AK was positive and HER-2/hiro was negative.  • 14 - Patient had BRCA 1 and 2 mutational analysis which returned with BRCA 2 deleterious mutation involving [id number] on the BRCA 2 sequencing gene.    • 2014 - She had FSH and serum estradiol levels, which are noted to be in the postmenopausal range.    • 14 - Patient was initiated on adjuvant chemotherapy with Taxotere and Cytoxan along with Neulasta.     • 10/6/14 - Patient completed 4th cycle of chemotherapy with Cytoxan and Taxotere.   • Patient was seen by Dr. Buck who performed excisional biopsy of a mole on the left forearm and  this was benign from history.     • 11/17/14 - Patient completed whole right breast radiation under the direction of Dr. Mcgrath.  She received total dose of 4256 cGy.  • Patient has been seen by Dr. Son who will perform her complete hysterectomy due to BRCA positivity after the first of the year.      • 12/23/14 - Patient had a bone density, which was essentially normal.    • December 2014 - Patient was initiated on Arimidex 1 mg every day as well as Os-Quinton D.   • April 2015 - Patient underwent total robotic hysterectomy with bilateral adnexectomy.  Pathology was benign.  I have requested for official report of the above procedure and path reports.  Patient has been seen by Dr. Son on 4/29/15.    • 5/19/15 - Port catheter was removed.   • 5/28/15 - Bilateral diagnostic mammogram which was essentially unremarkable except that patient has heterogenously dense breasts.  • 6/22/15 - Patient was seen by Dr. Guo.  Follow up in one year was recommended.    • Fasting lipid profile done which showed total cholesterol of 234, HDL of 42, triglyceride 237, elevated, and LDH was 145, also elevated.  BUN 20, creatinine 0.87 and LFTs were essentially unremarkable.  WBC 6.7, hemoglobin 12.4 and platelet count 197,000.    • 12/5/15 - Fasting lipid profile showed total cholesterol 133, HDL 50 and LDL 67, triglyceride 81.  BUN 22, creatinine 0.7.  • 2/24/16 - Bilateral breast MRI which showed post-op scar change on the right breast, but no MR finding to suggest malignancy.  She has a 4 mm enhancing lesion in the lower right breast at the inframammary fold.  Patient’s breast exam and skin evaluation was essentially unremarkable at the site where the 4 mm enhancing lesion was noted.  Patient also had no specific complaints.  • 3/22/16 - Anemia work up with reticulocyte count (N), B12 (N) 521, ferritin (N) 43, folate 20, haptoglobin 184, , BUN 11, creatinine 11.8.  SPEP with immunofixation assay showed IgG kappa  monoclonal protein with M spike of 0.2 gm/dL.  Erythropoietin level (N) 22 [range 2-18].    • 3/31/16 - Quantitative immunoglobulins for IgA, IgG and IgM were normal.  Serum free light chains were essentially unremarkable.    • 4/5/16 - 24-hour urine electrophoresis which did not show any evidence of monoclonal gammopathy.   • 6/10/16 - Bilateral mammogram was essentially normal.  Follow up in one year was recommended.    • 6/20/16 - Serum transferrin receptor assay returned with high level at 5.2 [range 1.9-4.9].    • 6/27/16 - Urinalysis showed trace blood.  • 8/1/16 - Patient was seen by Dr. Torres and had EGD and colonoscopy.  EGD showed hiatal hernia, moderate to severe gastroduodenitis, but no ulcers seen.  No abnormalities seen on her colonoscopy.   • 9/12/16 - Chemistry panel:  BUN 14, creatinine 0.8, total protein (L) 5.8, alkaline phosphatase 94 [range 32-91].  Quantitative immunoglobulins for IgA (N) 101, IgG slightly low at 530 [range 600-1500], IgM (N) 195.  SPEP with immunofixation assay was essentially negative for any significant monoclonal protein.   • 9/19/16 - Urinalysis was negative for hematuria.   • 10/12/16 - Bone scan showed degenerative changes in the knees, otherwise negative.  No signs of metastatic disease.    • 10/10 & 10/17/16 - Patient received IV Injectafer.    • 1/16/17 - SPEP with immunofixation assay which did not show any evidence of monoclonal protein.   • 2/9/17 - Bilateral breast MRI showed no evidence of malignancy, but she had a skin lesion noted on the left breast.  Patient has since then seen Dr. Buck.  She has a follow up appointment in six months.    • 6/26/17 - Patient had bilateral diagnostic mammogram with tomosynthesis.  This revealed new linear pleomorphic microcalcifications in the medial right breast.  The left breast was unchanged.  Stereotactic biopsy was recommended.    • 7/12/17 - Patient underwent stereotactic biopsy of the right breast calcifications.   Pathology showed fibrous mastopathy with coarse stromal microcalcifications suggestive of previous procedure.  Negative for carcinoma in situ and invasive carcinoma.  The result was concurrent with the image findings.  Follow up in one year was recommended.    • 9/11/17 - Since her last visit patient had labs including SPEP with immunofixation assay which showed a free lambda light chain identified.  IgA was 83.  IgG was 460.  IgM was 158.  BUN 17.  Creatinine 0.6.  Alkaline phosphatase 101.  Serum calcium 9.2.  CMP was normal.    • 9/16/17 - DEXA scan was normal.    • 2/15/18 - Bilateral breast MRI with stable post-op changes seen at the right breast, but no mass was seen in either the right or left breast.  Bilateral breast mammogram is due June 2018.   • 3/12/18 - CBC:  WBC 7.9, hemoglobin 13, platelet count 183,000.  Chemistry panel:  BUN 19.  Creatinine 0.7.  Alkaline phosphatase 92.  IgA 511.  IgG low at 518.  IgM normal 191.  Kappa lambda free light chains were normal.  SPEP with LAZARO was normal.    • 7/30/18 - Bilateral diagnostic mammogram with 3D with no mammographic evidence of malignancy.    • 9/18/18 - CT scan of the abdomen and pelvis which showed post-op changes, but no masses were identified in the abdomen.    • 2/4/19 - SPEP with LAZARO which did not show any monoclonal protein.  Total cholesterol was 116, triglycerides 69, LDL 66, HDL 43.  Chemistry:  BUN 17, creatinine 0.9, liver function tests are normal.    · 2/22/2019-patient had bilateral breast MRI, there was no MR evidence of malignancy in either breast.  · 8/29/19-patient had a DEXA scan, this was normal bone mineral density  · 8/29/2019-patient had bilateral mammogram which showed heterogeneously dense breast tissue.  Scar tissue was noted on the right breast consistent with previous surgery.  Follow-up in 1 year was recommended  · 2/19/2020 patient had bilateral breast MRI which was normal, follow-up in 1 year was recommended  · She is here  today for routine follow-up visit.  Patient does not have any breast specific complaints today    Past Medical History:   Diagnosis Date   • Breast cancer (CMS/HCC)    • Hypertension        Past Surgical History:   Procedure Laterality Date   • APPENDECTOMY     • BREAST BIOPSY     • BREAST LUMPECTOMY     • CARPAL TUNNEL RELEASE     • CHOLECYSTECTOMY     • HYSTERECTOMY     • OOPHORECTOMY           Current Outpatient Medications:   •  aspirin 81 MG tablet, ASPIRIN 81 MG ORAL TABLET, Disp: , Rfl:   •  atorvastatin (LIPITOR) 20 MG tablet, TAKE 1 TABLET DAILY, Disp: 90 tablet, Rfl: 1  •  buPROPion XL (WELLBUTRIN XL) 150 MG 24 hr tablet, , Disp: , Rfl:   •  Calcium-Magnesium-Vitamin D - MG-MG-UNIT tablet sustained-release 24 hour, Take 1 capsule by mouth Daily., Disp: , Rfl:   •  escitalopram (LEXAPRO) 20 MG tablet, , Disp: , Rfl:   •  exemestane (AROMASIN) 25 MG chemo tablet, Take 1 tablet by mouth Daily., Disp: 90 tablet, Rfl: 1  •  loratadine (Claritin) 10 MG tablet, , Disp: , Rfl:   •  Magnesium 250 MG tablet, Take 500 mg by mouth Daily., Disp: , Rfl:   •  Melatonin 3 MG capsule, Take  by mouth., Disp: , Rfl:   •  meloxicam (MOBIC) 15 MG tablet, TAKE 1 TABLET BY MOUTH DAILY, Disp: 90 tablet, Rfl: 0  •  metFORMIN (GLUCOPHAGE) 500 MG tablet, TAKE 2 TABLETS TWICE A DAY, Disp: 360 tablet, Rfl: 3  •  multivitamin with minerals (Centrum Silver 50+Women) tablet tablet, , Disp: , Rfl:   •  NON FORMULARY, OSTEOBIFLEX 1 TAB DAILY, Disp: , Rfl:   •  pantoprazole (PROTONIX) 40 MG EC tablet, , Disp: , Rfl:   •  SYNTHROID 50 MCG tablet, TAKE 1 TABLET DAILY, Disp: 90 tablet, Rfl: 3  •  exemestane (AROMASIN) 25 MG chemo tablet, Take 1 tablet by mouth Daily., Disp: 90 tablet, Rfl: 1    Allergies   Allergen Reactions   • Codeine Nausea And Vomiting   • Ibuprofen Anaphylaxis       Family History   Problem Relation Age of Onset   • Lung cancer Maternal Aunt    • Uterine cancer Paternal Aunt 60   • Prostate cancer Paternal  Uncle    • Melanoma Paternal Grandmother    • Prostate cancer Paternal Cousin    • Melanoma Paternal Cousin    • Esophageal cancer Paternal Cousin    • Thyroid cancer Maternal Cousin 40   • Esophageal cancer Maternal Cousin    • Ovarian cancer Maternal Cousin 50       Cancer-related family history includes Esophageal cancer in her maternal cousin and paternal cousin; Lung cancer in her maternal aunt; Melanoma in her paternal cousin and paternal grandmother; Ovarian cancer (age of onset: 50) in her maternal cousin; Prostate cancer in her paternal cousin and paternal uncle; Thyroid cancer (age of onset: 40) in her maternal cousin; Uterine cancer (age of onset: 60) in her paternal aunt.    Social History     Tobacco Use   • Smoking status: Never Smoker   • Smokeless tobacco: Never Used   Substance Use Topics   • Alcohol use: Yes     Frequency: Monthly or less     Comment: occasionally   • Drug use: Never       I have reviewed and confirmed the accuracy of the patient's history: Chief complaint, HPI and ROS as entered by the MA/LPN/RN. Munira Finney MD 02/11/21     SUBJECTIVE:      The patient is here for a follow up appointment.  She has no specific complaints today        REVIEW OF SYSTEMS:  Review of Systems   Constitutional: Negative for chills and fever.   HENT: Negative for ear pain, mouth sores, nosebleeds and sore throat.    Eyes: Negative for photophobia and visual disturbance.   Respiratory: Negative for wheezing and stridor.    Cardiovascular: Negative for chest pain and palpitations.   Gastrointestinal: Negative for abdominal pain, diarrhea, nausea and vomiting.   Endocrine: Negative for cold intolerance and heat intolerance.   Genitourinary: Negative for dysuria and hematuria.   Musculoskeletal: Negative for joint swelling and neck stiffness.   Skin: Negative for color change and rash.   Neurological: Negative for seizures and syncope.   Hematological: Negative for adenopathy.        No obvious  "bleeding   Psychiatric/Behavioral: Negative for agitation, confusion and hallucinations.     ROS as documented above    OBJECTIVE:    Vitals:    02/10/21 1132   BP: 152/92   Pulse: 93   Resp: 18   Temp: 96.6 °F (35.9 °C)   Weight: 96.7 kg (213 lb 3.2 oz)   Height: 154.9 cm (61\")   PainSc: 0-No pain       ECOG  (1) Restricted in physically strenuous activity, ambulatory and able to do work of light nature    Physical Exam   Constitutional: She is oriented to person, place, and time. No distress.   HENT:   Head: Normocephalic and atraumatic.   Eyes: Conjunctivae are normal. Right eye exhibits no discharge. Left eye exhibits no discharge. No scleral icterus.   Neck: Normal range of motion. Neck supple. No thyromegaly present.   Cardiovascular: Normal rate, regular rhythm and normal heart sounds. Exam reveals no gallop and no friction rub.   Pulmonary/Chest: Effort normal. No stridor. No respiratory distress. She has no wheezes. Right breast exhibits no inverted nipple, no mass, no nipple discharge, no skin change and no tenderness. Left breast exhibits no inverted nipple, no mass, no nipple discharge, no skin change and no tenderness. No breast bleeding. Breasts are symmetrical.   Bilateral breast exam is negative  Bilateral axillary exam is negative   Abdominal: Soft. Bowel sounds are normal. She exhibits no mass. There is no abdominal tenderness. There is no rebound and no guarding.   Musculoskeletal: Normal range of motion. No tenderness.   Lymphadenopathy:     She has no cervical adenopathy.   Neurological: She is alert and oriented to person, place, and time. She exhibits normal muscle tone.   Skin: Skin is warm. No rash noted. She is not diaphoretic. No erythema.   Psychiatric: Her behavior is normal.   Nursing note and vitals reviewed.    Physical exam as documented above    I have reexamined the patient and the results are consistent with the previously documented exam. Munira Finney MD     RECENT " LABS    WBC   Date Value Ref Range Status   02/10/2021 8.25 3.40 - 10.80 10*3/mm3 Final     RBC   Date Value Ref Range Status   02/10/2021 4.53 3.77 - 5.28 10*6/mm3 Final     Hemoglobin   Date Value Ref Range Status   02/10/2021 12.4 12.0 - 15.9 g/dL Final     Hematocrit   Date Value Ref Range Status   02/10/2021 39.2 34.0 - 46.6 % Final     MCV   Date Value Ref Range Status   02/10/2021 86.5 79.0 - 97.0 fL Final     MCH   Date Value Ref Range Status   02/10/2021 27.4 26.6 - 33.0 pg Final     MCHC   Date Value Ref Range Status   02/10/2021 31.6 31.5 - 35.7 g/dL Final     RDW   Date Value Ref Range Status   02/10/2021 14.4 12.3 - 15.4 % Final     RDW-SD   Date Value Ref Range Status   02/10/2021 45.0 37.0 - 54.0 fl Final     MPV   Date Value Ref Range Status   02/10/2021 10.5 6.0 - 12.0 fL Final     Platelets   Date Value Ref Range Status   02/10/2021 198 140 - 450 10*3/mm3 Final     Neutrophil %   Date Value Ref Range Status   02/10/2021 48.3 42.7 - 76.0 % Final     Lymphocyte %   Date Value Ref Range Status   02/10/2021 41.7 19.6 - 45.3 % Final     Monocyte %   Date Value Ref Range Status   02/10/2021 8.6 5.0 - 12.0 % Final     Eosinophil %   Date Value Ref Range Status   02/10/2021 1.3 0.3 - 6.2 % Final     Basophil %   Date Value Ref Range Status   02/10/2021 0.1 0.0 - 1.5 % Final     Neutrophils, Absolute   Date Value Ref Range Status   02/10/2021 3.98 1.70 - 7.00 10*3/mm3 Final     Lymphocytes, Absolute   Date Value Ref Range Status   02/10/2021 3.44 (H) 0.70 - 3.10 10*3/mm3 Final     Monocytes, Absolute   Date Value Ref Range Status   02/10/2021 0.71 0.10 - 0.90 10*3/mm3 Final     Eosinophils, Absolute   Date Value Ref Range Status   02/10/2021 0.11 0.00 - 0.40 10*3/mm3 Final     Basophils, Absolute   Date Value Ref Range Status   02/10/2021 0.01 0.00 - 0.20 10*3/mm3 Final       Lab Results   Component Value Date    GLUCOSE 107 (H) 02/10/2021    BUN 15 02/10/2021    CREATININE 0.75 02/10/2021    EGFRIFNONA 78  02/10/2021    EGFRIFAFRI 85 11/10/2016    BCR 20.0 02/10/2021    K 4.2 02/10/2021    CO2 29.0 02/10/2021    CALCIUM 9.6 02/10/2021    PROTENTOTREF 6.5 08/10/2020    ALBUMIN 4.40 02/10/2021    LABIL2 1.2 08/10/2020    AST 20 02/10/2021    ALT 22 02/10/2021         Assessment/Plan     Malignant neoplasm of right female breast, unspecified estrogen receptor status, unspecified site of breast (CMS/HCC)  - CBC & Differential  - CBC Auto Differential  - MRI Breast Bilateral With & Without Contrast  - MRI Abdomen With & Without Contrast  - Comprehensive Metabolic Panel  - Comprehensive Metabolic Panel    Monoclonal gammopathy  - MRI Abdomen With & Without Contrast  - Comprehensive Metabolic Panel  - Comprehensive Metabolic Panel    BRCA2 gene mutation positive  - MRI Abdomen With & Without Contrast  - Comprehensive Metabolic Panel  - Comprehensive Metabolic Panel      ASSESSMENT:    1. T1b N0 M0 invasive ductal carcinoma, ER positive, DC positive, HER-2/hiro negative, grade III tumor.  On surveillance  2. Status post right lumpectomy with sentinel lymph node biopsy.    3. Status post adjuvant chemotherapy with Cytoxan and Taxotere for four cycles.    4. BRCA 2 sequencing gene with 3036 deletion 4 deleterious mutation.  Surveillance care  5. Status post right breast radiation.   6. Postmenopausal hormonal status.   7. Status post robotic complete hysterectomy and adnexectomy with benign findings.    8. Hypercholesterolemia on treatment.  Lipid panel 2/4/19 was normal.   9. Arimidex initiated December 2014, discontinued October 2016 due to side effects.  Aromasin initiated November 2016.  Remains on Aromasin.  Ongoing monitoring management  10. Iron deficiency anemia, resolved.   11. Monoclonal gammopathy of unknown significance.  On surveillance  12. Bilateral hip pain.  Resolved    PLAN:     She will continue Aromasin, Os-Quinton D.   Schedule bilateral breast MRI and also MRI of the pancreas at this time  Patient will also be  due for bone density in September 2021.   Follow-up with dermatologist once a year  She will continue monthly breast self exam and call for lumps, nipple discharge, skin discoloration. A CMP will be done today.    Her monoclonal labs will need to be checked again in 6 months August 2021 with her appointment  She will follow up with Dr. Torres for pancreatic cancer screening.  We discussed use of pancreatic MRI alternating with EUS.          Patient is five years out from her breast cancer diagnosis.  I did discuss prophylactic mastectomies.  At this time patient wants to continue with breast conservation.  I will continue Aromasin for her for breast cancer prevention.  She has not had any significant toxicities on this treatment.             I have reviewed labs results, imaging, vitals, and medications with the patient today. Will follow up in 6 months with me.         Patient verbalized understanding and is in agreement of the above plan.

## 2021-02-10 ENCOUNTER — APPOINTMENT (OUTPATIENT)
Dept: LAB | Facility: HOSPITAL | Age: 61
End: 2021-02-10

## 2021-02-10 ENCOUNTER — OFFICE VISIT (OUTPATIENT)
Dept: ONCOLOGY | Facility: CLINIC | Age: 61
End: 2021-02-10

## 2021-02-10 VITALS
HEIGHT: 61 IN | HEART RATE: 93 BPM | WEIGHT: 213.2 LBS | BODY MASS INDEX: 40.25 KG/M2 | SYSTOLIC BLOOD PRESSURE: 152 MMHG | DIASTOLIC BLOOD PRESSURE: 92 MMHG | RESPIRATION RATE: 18 BRPM | TEMPERATURE: 96.6 F

## 2021-02-10 DIAGNOSIS — Z15.09 BRCA2 GENE MUTATION POSITIVE: ICD-10-CM

## 2021-02-10 DIAGNOSIS — D47.2 MONOCLONAL GAMMOPATHY: ICD-10-CM

## 2021-02-10 DIAGNOSIS — C50.911 MALIGNANT NEOPLASM OF RIGHT FEMALE BREAST, UNSPECIFIED ESTROGEN RECEPTOR STATUS, UNSPECIFIED SITE OF BREAST (HCC): Primary | ICD-10-CM

## 2021-02-10 DIAGNOSIS — Z15.01 BRCA2 GENE MUTATION POSITIVE: ICD-10-CM

## 2021-02-10 LAB
ALBUMIN SERPL-MCNC: 4.4 G/DL (ref 3.5–5.2)
ALBUMIN/GLOB SERPL: 1.7 G/DL
ALP SERPL-CCNC: 107 U/L (ref 39–117)
ALT SERPL W P-5'-P-CCNC: 22 U/L (ref 1–33)
ANION GAP SERPL CALCULATED.3IONS-SCNC: 10 MMOL/L (ref 5–15)
AST SERPL-CCNC: 20 U/L (ref 1–32)
BASOPHILS # BLD AUTO: 0.01 10*3/MM3 (ref 0–0.2)
BASOPHILS NFR BLD AUTO: 0.1 % (ref 0–1.5)
BILIRUB SERPL-MCNC: 0.2 MG/DL (ref 0–1.2)
BUN SERPL-MCNC: 15 MG/DL (ref 8–23)
BUN/CREAT SERPL: 20 (ref 7–25)
CALCIUM SPEC-SCNC: 9.6 MG/DL (ref 8.6–10.5)
CHLORIDE SERPL-SCNC: 104 MMOL/L (ref 98–107)
CO2 SERPL-SCNC: 29 MMOL/L (ref 22–29)
CREAT SERPL-MCNC: 0.75 MG/DL (ref 0.57–1)
DEPRECATED RDW RBC AUTO: 45 FL (ref 37–54)
EOSINOPHIL # BLD AUTO: 0.11 10*3/MM3 (ref 0–0.4)
EOSINOPHIL NFR BLD AUTO: 1.3 % (ref 0.3–6.2)
ERYTHROCYTE [DISTWIDTH] IN BLOOD BY AUTOMATED COUNT: 14.4 % (ref 12.3–15.4)
GFR SERPL CREATININE-BSD FRML MDRD: 78 ML/MIN/1.73
GLOBULIN UR ELPH-MCNC: 2.6 GM/DL
GLUCOSE SERPL-MCNC: 107 MG/DL (ref 65–99)
HCT VFR BLD AUTO: 39.2 % (ref 34–46.6)
HGB BLD-MCNC: 12.4 G/DL (ref 12–15.9)
LYMPHOCYTES # BLD AUTO: 3.44 10*3/MM3 (ref 0.7–3.1)
LYMPHOCYTES NFR BLD AUTO: 41.7 % (ref 19.6–45.3)
MCH RBC QN AUTO: 27.4 PG (ref 26.6–33)
MCHC RBC AUTO-ENTMCNC: 31.6 G/DL (ref 31.5–35.7)
MCV RBC AUTO: 86.5 FL (ref 79–97)
MONOCYTES # BLD AUTO: 0.71 10*3/MM3 (ref 0.1–0.9)
MONOCYTES NFR BLD AUTO: 8.6 % (ref 5–12)
NEUTROPHILS NFR BLD AUTO: 3.98 10*3/MM3 (ref 1.7–7)
NEUTROPHILS NFR BLD AUTO: 48.3 % (ref 42.7–76)
PLATELET # BLD AUTO: 198 10*3/MM3 (ref 140–450)
PMV BLD AUTO: 10.5 FL (ref 6–12)
POTASSIUM SERPL-SCNC: 4.2 MMOL/L (ref 3.5–5.2)
PROT SERPL-MCNC: 7 G/DL (ref 6–8.5)
RBC # BLD AUTO: 4.53 10*6/MM3 (ref 3.77–5.28)
SODIUM SERPL-SCNC: 143 MMOL/L (ref 136–145)
WBC # BLD AUTO: 8.25 10*3/MM3 (ref 3.4–10.8)

## 2021-02-10 PROCEDURE — 85025 COMPLETE CBC W/AUTO DIFF WBC: CPT | Performed by: INTERNAL MEDICINE

## 2021-02-10 PROCEDURE — 36415 COLL VENOUS BLD VENIPUNCTURE: CPT | Performed by: INTERNAL MEDICINE

## 2021-02-10 PROCEDURE — 99214 OFFICE O/P EST MOD 30 MIN: CPT | Performed by: INTERNAL MEDICINE

## 2021-02-10 PROCEDURE — 80053 COMPREHEN METABOLIC PANEL: CPT | Performed by: INTERNAL MEDICINE

## 2021-02-10 RX ORDER — MULTIPLE VITAMINS W/ MINERALS TAB 9MG-400MCG
1 TAB ORAL DAILY
COMMUNITY
Start: 2017-01-01

## 2021-02-23 RX ORDER — EXEMESTANE 25 MG/1
TABLET ORAL
Qty: 90 TABLET | Refills: 1 | Status: SHIPPED | OUTPATIENT
Start: 2021-02-23 | End: 2021-06-24

## 2021-03-02 ENCOUNTER — APPOINTMENT (OUTPATIENT)
Dept: MRI IMAGING | Facility: HOSPITAL | Age: 61
End: 2021-03-02

## 2021-03-02 ENCOUNTER — HOSPITAL ENCOUNTER (OUTPATIENT)
Dept: MRI IMAGING | Facility: HOSPITAL | Age: 61
Discharge: HOME | End: 2021-03-02
Admitting: INTERNAL MEDICINE

## 2021-03-02 DIAGNOSIS — C50.911 MALIGNANT NEOPLASM OF RIGHT FEMALE BREAST, UNSPECIFIED ESTROGEN RECEPTOR STATUS, UNSPECIFIED SITE OF BREAST (HCC): ICD-10-CM

## 2021-03-02 PROCEDURE — A9579 GAD-BASE MR CONTRAST NOS,1ML: HCPCS | Performed by: INTERNAL MEDICINE

## 2021-03-02 PROCEDURE — 25010000002 GADOTERIDOL PER 1 ML: Performed by: INTERNAL MEDICINE

## 2021-03-02 PROCEDURE — 77049 MRI BREAST C-+ W/CAD BI: CPT

## 2021-03-02 RX ADMIN — GADOTERIDOL 20 ML: 279.3 INJECTION, SOLUTION INTRAVENOUS at 09:37

## 2021-03-03 ENCOUNTER — PATIENT MESSAGE (OUTPATIENT)
Dept: FAMILY MEDICINE CLINIC | Facility: CLINIC | Age: 61
End: 2021-03-03

## 2021-03-03 ENCOUNTER — TELEPHONE (OUTPATIENT)
Dept: FAMILY MEDICINE CLINIC | Facility: CLINIC | Age: 61
End: 2021-03-03

## 2021-03-03 NOTE — TELEPHONE ENCOUNTER
----- Message from Sindy Martin sent at 3/3/2021  4:16 PM EST -----  Regarding: Non-Urgent Medical Question  Contact: [phone]  I'm now eligible for the COVID vaccine and I know that you and Dr. Finney requested that I get the vaccine.  My only concern is will it be safe for me considering the acute reaction I had to my flu shot this year and my severe reaction to Ibuprofen in 1990 and 1993.  My reaction to the flu shot was intense, but it did subside after a few days and I was fine.  I think you have in my records the severe reactions I had in 1990 and 1993.  Other than those two concerns,  I'm willing to get vaccinated.   Thanks, Dr. Cali.    Sindy Martin

## 2021-03-04 NOTE — TELEPHONE ENCOUNTER
Светлана tell Sindy that I think she needs to get vaccinated but I would prefer that she get the J&J vaccine which is just a one-time shot.  I think if she does react it will be short-lived and we will have to go through a second shot.  She will have to call around and surgery round to find out who is giving this 1.  The hospital will not be giving it and I am not sure if I US will be.  She may have to check at some of the pharmacies to see if they are going to carry it

## 2021-03-06 ENCOUNTER — HOSPITAL ENCOUNTER (OUTPATIENT)
Dept: MRI IMAGING | Facility: HOSPITAL | Age: 61
Discharge: HOME | End: 2021-03-06
Admitting: INTERNAL MEDICINE

## 2021-03-06 DIAGNOSIS — Z15.01 BRCA2 GENE MUTATION POSITIVE: ICD-10-CM

## 2021-03-06 DIAGNOSIS — D47.2 MONOCLONAL GAMMOPATHY: ICD-10-CM

## 2021-03-06 DIAGNOSIS — C50.911 MALIGNANT NEOPLASM OF RIGHT FEMALE BREAST, UNSPECIFIED ESTROGEN RECEPTOR STATUS, UNSPECIFIED SITE OF BREAST (HCC): ICD-10-CM

## 2021-03-06 DIAGNOSIS — Z15.09 BRCA2 GENE MUTATION POSITIVE: ICD-10-CM

## 2021-03-06 PROCEDURE — 74183 MRI ABD W/O CNTR FLWD CNTR: CPT

## 2021-03-06 PROCEDURE — 25010000002 GADOTERIDOL PER 1 ML: Performed by: INTERNAL MEDICINE

## 2021-03-06 PROCEDURE — A9579 GAD-BASE MR CONTRAST NOS,1ML: HCPCS | Performed by: INTERNAL MEDICINE

## 2021-03-06 RX ADMIN — GADOTERIDOL 20 ML: 279.3 INJECTION, SOLUTION INTRAVENOUS at 09:41

## 2021-03-08 ENCOUNTER — PATIENT MESSAGE (OUTPATIENT)
Dept: FAMILY MEDICINE CLINIC | Facility: CLINIC | Age: 61
End: 2021-03-08

## 2021-03-08 ENCOUNTER — TELEPHONE (OUTPATIENT)
Dept: FAMILY MEDICINE CLINIC | Facility: CLINIC | Age: 61
End: 2021-03-08

## 2021-03-08 RX ORDER — ETODOLAC 500 MG/1
500 TABLET, FILM COATED ORAL 2 TIMES DAILY
Qty: 60 TABLET | Refills: 2 | Status: SHIPPED | OUTPATIENT
Start: 2021-03-08 | End: 2021-04-07

## 2021-03-08 RX ORDER — PREDNISONE 20 MG/1
TABLET ORAL
Qty: 14 TABLET | Refills: 0 | Status: SHIPPED | OUTPATIENT
Start: 2021-03-08 | End: 2021-06-24

## 2021-03-08 NOTE — TELEPHONE ENCOUNTER
----- Message from Sindy Martin sent at 3/8/2021 12:08 PM EST -----  Regarding: Prescription Question  Contact: [phone]  My Meloxicam is due for refill and will require a new script. I want to let you know that the medication is no longer effective. Two months ago I started having symptoms of bicep tendonitis, pain in my right shoulder, off and on and became continuous and is constant now. I also have pain in the left bicep, elbow and around the left shoulder blade in my back. I have a new issue with the joint of my index finger on my right hand.  When I first wake up the knuckle joint is stiff, the index finger itself is stiff and requires me to bend it several times in order to get it to function. I've been using Aleve, lidocaine patches and heat patches. The pain is worse at night. Do I need a stronger or different script? I was pain free for almost 3 yrs.

## 2021-03-09 NOTE — TELEPHONE ENCOUNTER
Tell Sindy to come by the office for the following lab tests:  CBC, CMP, RA, sed rate, CRP, FRANCI    After the labs are taken start on Prednisone for 2 weeks only.  I will call in how to dose it.    After the prednisone has calmed the inflammation down we will try:    Etodolac 500mg one bid    #60

## 2021-03-10 ENCOUNTER — LAB (OUTPATIENT)
Dept: FAMILY MEDICINE CLINIC | Facility: CLINIC | Age: 61
End: 2021-03-10

## 2021-03-10 DIAGNOSIS — M25.50 ARTHRALGIA, UNSPECIFIED JOINT: ICD-10-CM

## 2021-03-10 DIAGNOSIS — M25.50 ARTHRALGIA, UNSPECIFIED JOINT: Primary | ICD-10-CM

## 2021-03-10 LAB
ALBUMIN SERPL-MCNC: 4.5 G/DL (ref 3.5–5.2)
ALBUMIN/GLOB SERPL: 1.7 G/DL
ALP SERPL-CCNC: 107 U/L (ref 39–117)
ALT SERPL W P-5'-P-CCNC: 22 U/L (ref 1–33)
ANION GAP SERPL CALCULATED.3IONS-SCNC: 11.2 MMOL/L (ref 5–15)
AST SERPL-CCNC: 22 U/L (ref 1–32)
BASOPHILS # BLD AUTO: 0.02 10*3/MM3 (ref 0–0.2)
BASOPHILS NFR BLD AUTO: 0.2 % (ref 0–1.5)
BILIRUB SERPL-MCNC: 0.3 MG/DL (ref 0–1.2)
BUN SERPL-MCNC: 19 MG/DL (ref 8–23)
BUN/CREAT SERPL: 26 (ref 7–25)
CALCIUM SPEC-SCNC: 9.9 MG/DL (ref 8.6–10.5)
CHLORIDE SERPL-SCNC: 101 MMOL/L (ref 98–107)
CHROMATIN AB SERPL-ACNC: <10 IU/ML (ref 0–14)
CO2 SERPL-SCNC: 28.8 MMOL/L (ref 22–29)
CREAT SERPL-MCNC: 0.73 MG/DL (ref 0.57–1)
CRP SERPL-MCNC: 0.34 MG/DL (ref 0–0.5)
DEPRECATED RDW RBC AUTO: 42 FL (ref 37–54)
EOSINOPHIL # BLD AUTO: 0.16 10*3/MM3 (ref 0–0.4)
EOSINOPHIL NFR BLD AUTO: 1.7 % (ref 0.3–6.2)
ERYTHROCYTE [DISTWIDTH] IN BLOOD BY AUTOMATED COUNT: 13.5 % (ref 12.3–15.4)
ERYTHROCYTE [SEDIMENTATION RATE] IN BLOOD: 5 MM/HR (ref 0–30)
GFR SERPL CREATININE-BSD FRML MDRD: 81 ML/MIN/1.73
GLOBULIN UR ELPH-MCNC: 2.7 GM/DL
GLUCOSE SERPL-MCNC: 96 MG/DL (ref 65–99)
HCT VFR BLD AUTO: 40.7 % (ref 34–46.6)
HGB BLD-MCNC: 12.8 G/DL (ref 12–15.9)
IMM GRANULOCYTES # BLD AUTO: 0.02 10*3/MM3 (ref 0–0.05)
IMM GRANULOCYTES NFR BLD AUTO: 0.2 % (ref 0–0.5)
LYMPHOCYTES # BLD AUTO: 3.71 10*3/MM3 (ref 0.7–3.1)
LYMPHOCYTES NFR BLD AUTO: 40 % (ref 19.6–45.3)
MCH RBC QN AUTO: 27.3 PG (ref 26.6–33)
MCHC RBC AUTO-ENTMCNC: 31.4 G/DL (ref 31.5–35.7)
MCV RBC AUTO: 86.8 FL (ref 79–97)
MONOCYTES # BLD AUTO: 0.79 10*3/MM3 (ref 0.1–0.9)
MONOCYTES NFR BLD AUTO: 8.5 % (ref 5–12)
NEUTROPHILS NFR BLD AUTO: 4.58 10*3/MM3 (ref 1.7–7)
NEUTROPHILS NFR BLD AUTO: 49.4 % (ref 42.7–76)
NRBC BLD AUTO-RTO: 0 /100 WBC (ref 0–0.2)
PLATELET # BLD AUTO: 212 10*3/MM3 (ref 140–450)
PMV BLD AUTO: 12 FL (ref 6–12)
POTASSIUM SERPL-SCNC: 4.1 MMOL/L (ref 3.5–5.2)
PROT SERPL-MCNC: 7.2 G/DL (ref 6–8.5)
RBC # BLD AUTO: 4.69 10*6/MM3 (ref 3.77–5.28)
SODIUM SERPL-SCNC: 141 MMOL/L (ref 136–145)
WBC # BLD AUTO: 9.28 10*3/MM3 (ref 3.4–10.8)

## 2021-03-10 PROCEDURE — 86140 C-REACTIVE PROTEIN: CPT | Performed by: FAMILY MEDICINE

## 2021-03-10 PROCEDURE — 86038 ANTINUCLEAR ANTIBODIES: CPT | Performed by: FAMILY MEDICINE

## 2021-03-10 PROCEDURE — 80053 COMPREHEN METABOLIC PANEL: CPT | Performed by: FAMILY MEDICINE

## 2021-03-10 PROCEDURE — 86431 RHEUMATOID FACTOR QUANT: CPT | Performed by: FAMILY MEDICINE

## 2021-03-10 PROCEDURE — 85025 COMPLETE CBC W/AUTO DIFF WBC: CPT | Performed by: FAMILY MEDICINE

## 2021-03-10 PROCEDURE — 85652 RBC SED RATE AUTOMATED: CPT | Performed by: FAMILY MEDICINE

## 2021-03-12 ENCOUNTER — TELEPHONE (OUTPATIENT)
Dept: FAMILY MEDICINE CLINIC | Facility: CLINIC | Age: 61
End: 2021-03-12

## 2021-03-12 LAB — ANA SER QL: NEGATIVE

## 2021-03-15 RX ORDER — GABAPENTIN 100 MG/1
CAPSULE ORAL
Qty: 90 CAPSULE | Refills: 2 | Status: SHIPPED | OUTPATIENT
Start: 2021-03-15 | End: 2021-04-26

## 2021-03-18 ENCOUNTER — TELEPHONE (OUTPATIENT)
Dept: FAMILY MEDICINE CLINIC | Facility: CLINIC | Age: 61
End: 2021-03-18

## 2021-03-18 NOTE — TELEPHONE ENCOUNTER
Caller: Sindy Martin    Relationship to patient: Self    Best call back number: [phone]    Patient is needing: PT STATES THE PHARMACY HAS 4 MEDICATIONS READY FOR PT TO . PT THOUGHT THAT SHE WAS ONLY SUPPOSE TO TAKE GABAPENTIN. PT COULD NOT RECALL NAMES OF OVER 3 MEDICATIONS. PT IS WANTING TO KNOW IF THERE IS OTHER MEDICATIONS SHE IS NEEDING TO TAKE.

## 2021-04-05 RX ORDER — ATORVASTATIN CALCIUM 20 MG/1
TABLET, FILM COATED ORAL
Qty: 90 TABLET | Refills: 1 | Status: SHIPPED | OUTPATIENT
Start: 2021-04-05 | End: 2021-12-09

## 2021-04-26 RX ORDER — GABAPENTIN 100 MG/1
CAPSULE ORAL
Qty: 90 CAPSULE | Refills: 2 | Status: SHIPPED | OUTPATIENT
Start: 2021-04-26 | End: 2021-06-24

## 2021-05-04 RX ORDER — LEVOTHYROXINE SODIUM 50 MCG
TABLET ORAL
Qty: 90 TABLET | Refills: 3 | Status: SHIPPED | OUTPATIENT
Start: 2021-05-04 | End: 2022-04-20

## 2021-06-17 ENCOUNTER — TELEPHONE (OUTPATIENT)
Dept: FAMILY MEDICINE CLINIC | Facility: CLINIC | Age: 61
End: 2021-06-17

## 2021-06-17 NOTE — TELEPHONE ENCOUNTER
----- Message from Rubens Cali MD sent at 6/16/2021  9:24 PM EDT -----  Светлана look for Sindy to call for a time to inject her right shoulder anterior near the biceps.

## 2021-06-17 NOTE — TELEPHONE ENCOUNTER
Gave message to patient at 3:03pm.      Mario - please put on PMJ's schedule for 6/24/2021 at 3:30pm for a shoulder injection.

## 2021-06-22 ENCOUNTER — TELEPHONE (OUTPATIENT)
Dept: ONCOLOGY | Facility: CLINIC | Age: 61
End: 2021-06-22

## 2021-06-22 NOTE — TELEPHONE ENCOUNTER
Left v/m letting Pt know Reg will not be in the office on the day of her appt, 8/11. Appt was cxl'd with the need to be r/s. Gave direct # for her to call at earliest convince to do so

## 2021-06-24 ENCOUNTER — PROCEDURE VISIT (OUTPATIENT)
Dept: FAMILY MEDICINE CLINIC | Facility: CLINIC | Age: 61
End: 2021-06-24

## 2021-06-24 ENCOUNTER — TELEPHONE (OUTPATIENT)
Dept: ONCOLOGY | Facility: CLINIC | Age: 61
End: 2021-06-24

## 2021-06-24 VITALS
HEART RATE: 104 BPM | SYSTOLIC BLOOD PRESSURE: 110 MMHG | RESPIRATION RATE: 16 BRPM | DIASTOLIC BLOOD PRESSURE: 60 MMHG | OXYGEN SATURATION: 97 %

## 2021-06-24 DIAGNOSIS — M75.101 ROTATOR CUFF SYNDROME OF RIGHT SHOULDER: Primary | ICD-10-CM

## 2021-06-24 PROCEDURE — 20610 DRAIN/INJ JOINT/BURSA W/O US: CPT | Performed by: FAMILY MEDICINE

## 2021-06-24 RX ORDER — ETODOLAC 500 MG/1
500 TABLET, FILM COATED ORAL 2 TIMES DAILY
COMMUNITY
Start: 2021-05-03 | End: 2021-06-24

## 2021-06-24 RX ADMIN — METHYLPREDNISOLONE ACETATE 80 MG: 80 INJECTION, SUSPENSION INTRA-ARTICULAR; INTRALESIONAL; INTRAMUSCULAR; SOFT TISSUE at 15:49

## 2021-06-24 RX ADMIN — BUPIVACAINE HYDROCHLORIDE 0.5 ML: 5 INJECTION, SOLUTION EPIDURAL; INTRACAUDAL at 15:49

## 2021-06-24 RX ADMIN — METHYLPREDNISOLONE ACETATE 40 MG: 80 INJECTION, SUSPENSION INTRA-ARTICULAR; INTRALESIONAL; INTRAMUSCULAR; SOFT TISSUE at 15:49

## 2021-06-24 RX ADMIN — BUPIVACAINE HYDROCHLORIDE 1 ML: 5 INJECTION, SOLUTION EPIDURAL; INTRACAUDAL at 15:49

## 2021-06-24 NOTE — TELEPHONE ENCOUNTER
Called Pt back. Left v/m asking for a return call again and letting her know I would try her back as well

## 2021-07-24 RX ORDER — METHYLPREDNISOLONE ACETATE 80 MG/ML
80 INJECTION, SUSPENSION INTRA-ARTICULAR; INTRALESIONAL; INTRAMUSCULAR; SOFT TISSUE
Status: COMPLETED | OUTPATIENT
Start: 2021-06-24 | End: 2021-06-24

## 2021-07-24 RX ORDER — BUPIVACAINE HYDROCHLORIDE 5 MG/ML
0.5 INJECTION, SOLUTION EPIDURAL; INTRACAUDAL
Status: COMPLETED | OUTPATIENT
Start: 2021-06-24 | End: 2021-06-24

## 2021-07-24 RX ORDER — METHYLPREDNISOLONE ACETATE 80 MG/ML
40 INJECTION, SUSPENSION INTRA-ARTICULAR; INTRALESIONAL; INTRAMUSCULAR; SOFT TISSUE
Status: COMPLETED | OUTPATIENT
Start: 2021-06-24 | End: 2021-06-24

## 2021-07-24 RX ORDER — BUPIVACAINE HYDROCHLORIDE 5 MG/ML
1 INJECTION, SOLUTION EPIDURAL; INTRACAUDAL
Status: COMPLETED | OUTPATIENT
Start: 2021-06-24 | End: 2021-06-24

## 2021-07-24 NOTE — ASSESSMENT & PLAN NOTE
Rotator cuff exercises  Injection laterally of 120 mg Depo-Medrol and 1/2 cc of Marcaine.  Continue exercises after injection.

## 2021-07-30 ENCOUNTER — TELEPHONE (OUTPATIENT)
Dept: ONCOLOGY | Facility: CLINIC | Age: 61
End: 2021-07-30

## 2021-08-06 ENCOUNTER — TELEPHONE (OUTPATIENT)
Dept: ONCOLOGY | Facility: CLINIC | Age: 61
End: 2021-08-06

## 2021-08-06 NOTE — TELEPHONE ENCOUNTER
Caller: Sindy Martin    Relationship to patient: Self    Best call back number: [phone]    Chief complaint: RETURNING CALL WAS TOLD NEEDED TO R/S 08/19 APPT DR OWENS WOULD NOT BE IN THE OFFICE     Type of visit: FOLLOW UP AND LAB     Requested date: ANYTIME    If rescheduling, when is the original appointment: 08/19    Additional notes:      CAN LEAVE VOICE MAIL IF NO ANSWER

## 2021-08-16 ENCOUNTER — OFFICE VISIT (OUTPATIENT)
Dept: ONCOLOGY | Facility: CLINIC | Age: 61
End: 2021-08-16

## 2021-08-16 ENCOUNTER — LAB (OUTPATIENT)
Dept: LAB | Facility: HOSPITAL | Age: 61
End: 2021-08-16

## 2021-08-16 VITALS
HEART RATE: 100 BPM | WEIGHT: 208 LBS | TEMPERATURE: 96.9 F | SYSTOLIC BLOOD PRESSURE: 147 MMHG | HEIGHT: 61 IN | RESPIRATION RATE: 18 BRPM | BODY MASS INDEX: 39.27 KG/M2 | DIASTOLIC BLOOD PRESSURE: 81 MMHG

## 2021-08-16 DIAGNOSIS — Z78.0 POST-MENOPAUSAL: ICD-10-CM

## 2021-08-16 DIAGNOSIS — Z15.09 BRCA2 GENE MUTATION POSITIVE: ICD-10-CM

## 2021-08-16 DIAGNOSIS — D47.2 MONOCLONAL GAMMOPATHY: ICD-10-CM

## 2021-08-16 DIAGNOSIS — Z15.01 BRCA2 GENE MUTATION POSITIVE: ICD-10-CM

## 2021-08-16 DIAGNOSIS — C50.911 MALIGNANT NEOPLASM OF RIGHT BREAST IN FEMALE, ESTROGEN RECEPTOR POSITIVE, UNSPECIFIED SITE OF BREAST (HCC): ICD-10-CM

## 2021-08-16 DIAGNOSIS — Z17.0 MALIGNANT NEOPLASM OF RIGHT BREAST IN FEMALE, ESTROGEN RECEPTOR POSITIVE, UNSPECIFIED SITE OF BREAST (HCC): ICD-10-CM

## 2021-08-16 DIAGNOSIS — C50.911 MALIGNANT NEOPLASM OF RIGHT FEMALE BREAST, UNSPECIFIED ESTROGEN RECEPTOR STATUS, UNSPECIFIED SITE OF BREAST (HCC): Primary | ICD-10-CM

## 2021-08-16 DIAGNOSIS — C50.911 MALIGNANT NEOPLASM OF RIGHT FEMALE BREAST, UNSPECIFIED ESTROGEN RECEPTOR STATUS, UNSPECIFIED SITE OF BREAST (HCC): ICD-10-CM

## 2021-08-16 LAB
ALBUMIN SERPL-MCNC: 4.2 G/DL (ref 3.5–5.2)
ALBUMIN/GLOB SERPL: 1.7 G/DL
ALP SERPL-CCNC: 99 U/L (ref 39–117)
ALT SERPL W P-5'-P-CCNC: 21 U/L (ref 1–33)
ANION GAP SERPL CALCULATED.3IONS-SCNC: 12.1 MMOL/L (ref 5–15)
AST SERPL-CCNC: 16 U/L (ref 1–32)
BASOPHILS # BLD AUTO: 0.01 10*3/MM3 (ref 0–0.2)
BASOPHILS NFR BLD AUTO: 0.1 % (ref 0–1.5)
BILIRUB SERPL-MCNC: 0.3 MG/DL (ref 0–1.2)
BUN SERPL-MCNC: 17 MG/DL (ref 8–23)
BUN/CREAT SERPL: 22.7 (ref 7–25)
CALCIUM SPEC-SCNC: 9.4 MG/DL (ref 8.6–10.5)
CHLORIDE SERPL-SCNC: 107 MMOL/L (ref 98–107)
CO2 SERPL-SCNC: 24.9 MMOL/L (ref 22–29)
CREAT SERPL-MCNC: 0.75 MG/DL (ref 0.57–1)
DEPRECATED RDW RBC AUTO: 46.6 FL (ref 37–54)
EOSINOPHIL # BLD AUTO: 0.16 10*3/MM3 (ref 0–0.4)
EOSINOPHIL NFR BLD AUTO: 2.2 % (ref 0.3–6.2)
ERYTHROCYTE [DISTWIDTH] IN BLOOD BY AUTOMATED COUNT: 15.1 % (ref 12.3–15.4)
GFR SERPL CREATININE-BSD FRML MDRD: 78 ML/MIN/1.73
GLOBULIN UR ELPH-MCNC: 2.5 GM/DL
GLUCOSE SERPL-MCNC: 112 MG/DL (ref 65–99)
HCT VFR BLD AUTO: 38.7 % (ref 34–46.6)
HGB BLD-MCNC: 12.6 G/DL (ref 12–15.9)
IGA1 MFR SER: 105 MG/DL (ref 70–400)
IGG1 SER-MCNC: 519 MG/DL (ref 700–1600)
IGM SERPL-MCNC: 155 MG/DL (ref 40–230)
LYMPHOCYTES # BLD AUTO: 2.77 10*3/MM3 (ref 0.7–3.1)
LYMPHOCYTES NFR BLD AUTO: 37.5 % (ref 19.6–45.3)
MCH RBC QN AUTO: 27.8 PG (ref 26.6–33)
MCHC RBC AUTO-ENTMCNC: 32.6 G/DL (ref 31.5–35.7)
MCV RBC AUTO: 85.2 FL (ref 79–97)
MONOCYTES # BLD AUTO: 0.66 10*3/MM3 (ref 0.1–0.9)
MONOCYTES NFR BLD AUTO: 8.9 % (ref 5–12)
NEUTROPHILS NFR BLD AUTO: 3.78 10*3/MM3 (ref 1.7–7)
NEUTROPHILS NFR BLD AUTO: 51.3 % (ref 42.7–76)
PLATELET # BLD AUTO: 195 10*3/MM3 (ref 140–450)
PMV BLD AUTO: 11.1 FL (ref 6–12)
POTASSIUM SERPL-SCNC: 4.3 MMOL/L (ref 3.5–5.2)
PROT SERPL-MCNC: 6.7 G/DL (ref 6–8.5)
RBC # BLD AUTO: 4.54 10*6/MM3 (ref 3.77–5.28)
SODIUM SERPL-SCNC: 144 MMOL/L (ref 136–145)
WBC # BLD AUTO: 7.38 10*3/MM3 (ref 3.4–10.8)

## 2021-08-16 PROCEDURE — 80053 COMPREHEN METABOLIC PANEL: CPT | Performed by: INTERNAL MEDICINE

## 2021-08-16 PROCEDURE — 99214 OFFICE O/P EST MOD 30 MIN: CPT | Performed by: INTERNAL MEDICINE

## 2021-08-16 PROCEDURE — 36415 COLL VENOUS BLD VENIPUNCTURE: CPT | Performed by: INTERNAL MEDICINE

## 2021-08-16 PROCEDURE — 82784 ASSAY IGA/IGD/IGG/IGM EACH: CPT | Performed by: INTERNAL MEDICINE

## 2021-08-16 PROCEDURE — 85025 COMPLETE CBC W/AUTO DIFF WBC: CPT

## 2021-08-16 RX ORDER — MELOXICAM 15 MG/1
15 TABLET ORAL DAILY
COMMUNITY
Start: 2021-07-27 | End: 2021-08-26

## 2021-08-16 NOTE — PROGRESS NOTES
Hematology/Oncology Outpatient Follow Up    PATIENT NAME:Sindy Martin  :1960  MRN: [id number]  PRIMARY CARE PHYSICIAN: Rubens Cali MD  REFERRING PHYSICIAN: Rubens Cali MD    Chief Complaint   Patient presents with   • Follow-up     Malignant neoplasm of right female breast         HISTORY OF PRESENT ILLNESS:     This is a 60-year-old female who was originally seen on 14 after she presented with early stage right breast cancer.  Please refer to the details of my notes for more information.   • She had an abnormal mammogram in May 2014.    • 6/10/14 - She underwent ultrasound guided localization of the right breast mass with sentinel lymph node injection, right lumpectomy.  Pathology revealed one sentinel lymph node negative for malignancy.  Tumor was strongly positive for ER, SD and HER-2/hiro was negative.  Tumor measured 1 cm in greatest dimension.  It was a grade III disease.  All the margins were negative.  Pathologic stage is [id number].    • 14 - Patient’s tumor was sent for Oncotype DX assay.  This returned with a recurrent score and validation study, she has a 12% chance of distant recurrence despite five years of Tamoxifen over the next 10 years.  On the validation study her ER was positive, SD was positive and HER-2/hiro was negative.  • 14 - Patient had BRCA 1 and 2 mutational analysis which returned with BRCA 2 deleterious mutation involving [id number] on the BRCA 2 sequencing gene.    • 2014 - She had FSH and serum estradiol levels, which are noted to be in the postmenopausal range.    • 14 - Patient was initiated on adjuvant chemotherapy with Taxotere and Cytoxan along with Neulasta.     • 10/6/14 - Patient completed 4th cycle of chemotherapy with Cytoxan and Taxotere.   • Patient was seen by Dr. Buck who performed excisional biopsy of a mole on the left forearm and this was benign from history.     • 14 - Patient completed whole right breast radiation  under the direction of Dr. Mcgrath.  She received total dose of 4256 cGy.  • Patient has been seen by Dr. Son who will perform her complete hysterectomy due to BRCA positivity after the first of the year.      • 12/23/14 - Patient had a bone density, which was essentially normal.    • December 2014 - Patient was initiated on Arimidex 1 mg every day as well as Os-Quinton D.   • April 2015 - Patient underwent total robotic hysterectomy with bilateral adnexectomy.  Pathology was benign.  I have requested for official report of the above procedure and path reports.  Patient has been seen by Dr. Son on 4/29/15.    • 5/19/15 - Port catheter was removed.   • 5/28/15 - Bilateral diagnostic mammogram which was essentially unremarkable except that patient has heterogenously dense breasts.  • 6/22/15 - Patient was seen by Dr. Guo.  Follow up in one year was recommended.    • Fasting lipid profile done which showed total cholesterol of 234, HDL of 42, triglyceride 237, elevated, and LDH was 145, also elevated.  BUN 20, creatinine 0.87 and LFTs were essentially unremarkable.  WBC 6.7, hemoglobin 12.4 and platelet count 197,000.    • 12/5/15 - Fasting lipid profile showed total cholesterol 133, HDL 50 and LDL 67, triglyceride 81.  BUN 22, creatinine 0.7.  • 2/24/16 - Bilateral breast MRI which showed post-op scar change on the right breast, but no MR finding to suggest malignancy.  She has a 4 mm enhancing lesion in the lower right breast at the inframammary fold.  Patient’s breast exam and skin evaluation was essentially unremarkable at the site where the 4 mm enhancing lesion was noted.  Patient also had no specific complaints.  • 3/22/16 - Anemia work up with reticulocyte count (N), B12 (N) 521, ferritin (N) 43, folate 20, haptoglobin 184, , BUN 11, creatinine 11.8.  SPEP with immunofixation assay showed IgG kappa monoclonal protein with M spike of 0.2 gm/dL.  Erythropoietin level (N) 22 [range 2-18].     • 3/31/16 - Quantitative immunoglobulins for IgA, IgG and IgM were normal.  Serum free light chains were essentially unremarkable.    • 4/5/16 - 24-hour urine electrophoresis which did not show any evidence of monoclonal gammopathy.   • 6/10/16 - Bilateral mammogram was essentially normal.  Follow up in one year was recommended.    • 6/20/16 - Serum transferrin receptor assay returned with high level at 5.2 [range 1.9-4.9].    • 6/27/16 - Urinalysis showed trace blood.  • 8/1/16 - Patient was seen by Dr. Torres and had EGD and colonoscopy.  EGD showed hiatal hernia, moderate to severe gastroduodenitis, but no ulcers seen.  No abnormalities seen on her colonoscopy.   • 9/12/16 - Chemistry panel:  BUN 14, creatinine 0.8, total protein (L) 5.8, alkaline phosphatase 94 [range 32-91].  Quantitative immunoglobulins for IgA (N) 101, IgG slightly low at 530 [range 600-1500], IgM (N) 195.  SPEP with immunofixation assay was essentially negative for any significant monoclonal protein.   • 9/19/16 - Urinalysis was negative for hematuria.   • 10/12/16 - Bone scan showed degenerative changes in the knees, otherwise negative.  No signs of metastatic disease.    • 10/10 & 10/17/16 - Patient received IV Injectafer.    • 1/16/17 - SPEP with immunofixation assay which did not show any evidence of monoclonal protein.   • 2/9/17 - Bilateral breast MRI showed no evidence of malignancy, but she had a skin lesion noted on the left breast.  Patient has since then seen Dr. Buck.  She has a follow up appointment in six months.    • 6/26/17 - Patient had bilateral diagnostic mammogram with tomosynthesis.  This revealed new linear pleomorphic microcalcifications in the medial right breast.  The left breast was unchanged.  Stereotactic biopsy was recommended.    • 7/12/17 - Patient underwent stereotactic biopsy of the right breast calcifications.  Pathology showed fibrous mastopathy with coarse stromal microcalcifications suggestive of  previous procedure.  Negative for carcinoma in situ and invasive carcinoma.  The result was concurrent with the image findings.  Follow up in one year was recommended.    • 9/11/17 - Since her last visit patient had labs including SPEP with immunofixation assay which showed a free lambda light chain identified.  IgA was 83.  IgG was 460.  IgM was 158.  BUN 17.  Creatinine 0.6.  Alkaline phosphatase 101.  Serum calcium 9.2.  CMP was normal.    • 9/16/17 - DEXA scan was normal.    • 2/15/18 - Bilateral breast MRI with stable post-op changes seen at the right breast, but no mass was seen in either the right or left breast.  Bilateral breast mammogram is due June 2018.   • 3/12/18 - CBC:  WBC 7.9, hemoglobin 13, platelet count 183,000.  Chemistry panel:  BUN 19.  Creatinine 0.7.  Alkaline phosphatase 92.  IgA 511.  IgG low at 518.  IgM normal 191.  Kappa lambda free light chains were normal.  SPEP with LAZARO was normal.    • 7/30/18 - Bilateral diagnostic mammogram with 3D with no mammographic evidence of malignancy.    • 9/18/18 - CT scan of the abdomen and pelvis which showed post-op changes, but no masses were identified in the abdomen.    • 2/4/19 - SPEP with LAZARO which did not show any monoclonal protein.  Total cholesterol was 116, triglycerides 69, LDL 66, HDL 43.  Chemistry:  BUN 17, creatinine 0.9, liver function tests are normal.    · 2/22/2019-patient had bilateral breast MRI, there was no MR evidence of malignancy in either breast.  · 8/29/19-patient had a DEXA scan, this was normal bone mineral density  · 8/29/2019-patient had bilateral mammogram which showed heterogeneously dense breast tissue.  Scar tissue was noted on the right breast consistent with previous surgery.  Follow-up in 1 year was recommended  · 2/19/2020 patient had bilateral breast MRI which was normal, follow-up in 1 year was recommended  · She is here today for routine follow-up visit.  Patient does not have any breast specific complaints  today  · March 2, 2021 patient had bilateral breast MRI which did not show any MR evidence of malignancy  · March 6, 2021 she had MRI of the abdomen which basically revealed mild fatty and atrophic pancreas no solid or cystic pancreatic mass was seen.  Patient to follow-up with Dr. Torres for EUS which would be due March 2022    Past Medical History:   Diagnosis Date   • Breast cancer (CMS/HCC)    • Hypertension        Past Surgical History:   Procedure Laterality Date   • APPENDECTOMY     • BREAST BIOPSY     • BREAST LUMPECTOMY     • CARPAL TUNNEL RELEASE     • CHOLECYSTECTOMY     • HYSTERECTOMY     • OOPHORECTOMY           Current Outpatient Medications:   •  aspirin 81 MG tablet, ASPIRIN 81 MG ORAL TABLET, Disp: , Rfl:   •  atorvastatin (LIPITOR) 20 MG tablet, TAKE 1 TABLET DAILY, Disp: 90 tablet, Rfl: 1  •  buPROPion XL (WELLBUTRIN XL) 150 MG 24 hr tablet, , Disp: , Rfl:   •  Calcium-Magnesium-Vitamin D - MG-MG-UNIT tablet sustained-release 24 hour, Take 1 capsule by mouth Daily., Disp: , Rfl:   •  escitalopram (LEXAPRO) 20 MG tablet, , Disp: , Rfl:   •  exemestane (AROMASIN) 25 MG chemo tablet, Take 1 tablet by mouth Daily., Disp: 90 tablet, Rfl: 1  •  loratadine (Claritin) 10 MG tablet, , Disp: , Rfl:   •  Magnesium 250 MG tablet, Take 500 mg by mouth Daily., Disp: , Rfl:   •  Melatonin 3 MG capsule, Take  by mouth., Disp: , Rfl:   •  meloxicam (MOBIC) 15 MG tablet, Take 15 mg by mouth Daily., Disp: , Rfl:   •  metFORMIN (GLUCOPHAGE) 500 MG tablet, TAKE 2 TABLETS TWICE A DAY, Disp: 360 tablet, Rfl: 3  •  multivitamin with minerals (Centrum Silver 50+Women) tablet tablet, , Disp: , Rfl:   •  NON FORMULARY, OSTEOBIFLEX 1 TAB DAILY, Disp: , Rfl:   •  pantoprazole (PROTONIX) 40 MG EC tablet, , Disp: , Rfl:   •  Synthroid 50 MCG tablet, TAKE 1 TABLET DAILY, Disp: 90 tablet, Rfl: 3    Allergies   Allergen Reactions   • Codeine Nausea And Vomiting   • Ibuprofen Anaphylaxis       Family History   Problem  Relation Age of Onset   • Lung cancer Maternal Aunt    • Uterine cancer Paternal Aunt 60   • Prostate cancer Paternal Uncle    • Melanoma Paternal Grandmother    • Prostate cancer Paternal Cousin    • Melanoma Paternal Cousin    • Esophageal cancer Paternal Cousin    • Thyroid cancer Maternal Cousin 40   • Esophageal cancer Maternal Cousin    • Ovarian cancer Maternal Cousin 50       Cancer-related family history includes Esophageal cancer in her maternal cousin and paternal cousin; Lung cancer in her maternal aunt; Melanoma in her paternal cousin and paternal grandmother; Ovarian cancer (age of onset: 50) in her maternal cousin; Prostate cancer in her paternal cousin and paternal uncle; Thyroid cancer (age of onset: 40) in her maternal cousin; Uterine cancer (age of onset: 60) in her paternal aunt.    Social History     Tobacco Use   • Smoking status: Never Smoker   • Smokeless tobacco: Never Used   Substance Use Topics   • Alcohol use: Yes     Comment: occasionally   • Drug use: Never       I have reviewed and confirmed the accuracy of the patient's history: Chief complaint, HPI and ROS as entered by the MA/LPN/RN. Munira Finney MD 08/16/21     SUBJECTIVE:    Patient has no specific complaints today.  She has not had any recent hospitalizations or admissions to the hospital.          REVIEW OF SYSTEMS:  Review of Systems   Constitutional: Negative for chills and fever.   HENT: Negative for ear pain, mouth sores, nosebleeds and sore throat.    Eyes: Negative for photophobia and visual disturbance.   Respiratory: Negative for wheezing and stridor.    Cardiovascular: Negative for chest pain and palpitations.   Gastrointestinal: Negative for abdominal pain, diarrhea, nausea and vomiting.   Endocrine: Negative for cold intolerance and heat intolerance.   Genitourinary: Negative for dysuria and hematuria.   Musculoskeletal: Negative for joint swelling and neck stiffness.   Skin: Negative for color change and  "rash.   Neurological: Negative for seizures and syncope.   Hematological: Negative for adenopathy.        No obvious bleeding   Psychiatric/Behavioral: Negative for agitation, confusion and hallucinations.     ROS as documented above    OBJECTIVE:    Vitals:    08/16/21 1306   BP: 147/81   Pulse: 100   Resp: 18   Temp: 96.9 °F (36.1 °C)   TempSrc: Infrared   Weight: 94.3 kg (208 lb)   Height: 154.9 cm (61\")   PainSc: 0-No pain       ECOG  (1) Restricted in physically strenuous activity, ambulatory and able to do work of light nature    Physical Exam   Constitutional: She is oriented to person, place, and time. No distress.   HENT:   Head: Normocephalic and atraumatic.   Eyes: Conjunctivae are normal. Right eye exhibits no discharge. Left eye exhibits no discharge. No scleral icterus.   Neck: No thyromegaly present.   Cardiovascular: Normal rate, regular rhythm and normal heart sounds. Exam reveals no gallop and no friction rub.   Pulmonary/Chest: Effort normal. No stridor. No respiratory distress. She has no wheezes. Right breast exhibits no inverted nipple, no mass, no nipple discharge, no skin change and no tenderness. Left breast exhibits no inverted nipple, no mass, no nipple discharge, no skin change and no tenderness. No breast bleeding. Breasts are symmetrical.   Bilateral breast exam is negative  Bilateral axillary exam is negative   Abdominal: Soft. Bowel sounds are normal. She exhibits no mass. There is no abdominal tenderness. There is no rebound and no guarding.   Musculoskeletal: Normal range of motion. No tenderness.   Lymphadenopathy:     She has no cervical adenopathy.   Neurological: She is alert and oriented to person, place, and time. She exhibits normal muscle tone.   Skin: Skin is warm. No rash noted. She is not diaphoretic. No erythema.   Psychiatric: Her behavior is normal.   Nursing note and vitals reviewed.    Physical exam as documented above    I have reexamined the patient and the results " are consistent with the previously documented exam. Munira Glendy Finney MD     RECENT LABS    WBC   Date Value Ref Range Status   08/16/2021 7.38 3.40 - 10.80 10*3/mm3 Final     RBC   Date Value Ref Range Status   08/16/2021 4.54 3.77 - 5.28 10*6/mm3 Final     Hemoglobin   Date Value Ref Range Status   08/16/2021 12.6 12.0 - 15.9 g/dL Final     Hematocrit   Date Value Ref Range Status   08/16/2021 38.7 34.0 - 46.6 % Final     MCV   Date Value Ref Range Status   08/16/2021 85.2 79.0 - 97.0 fL Final     MCH   Date Value Ref Range Status   08/16/2021 27.8 26.6 - 33.0 pg Final     MCHC   Date Value Ref Range Status   08/16/2021 32.6 31.5 - 35.7 g/dL Final     RDW   Date Value Ref Range Status   08/16/2021 15.1 12.3 - 15.4 % Final     RDW-SD   Date Value Ref Range Status   08/16/2021 46.6 37.0 - 54.0 fl Final     MPV   Date Value Ref Range Status   08/16/2021 11.1 6.0 - 12.0 fL Final     Platelets   Date Value Ref Range Status   08/16/2021 195 140 - 450 10*3/mm3 Final     Neutrophil %   Date Value Ref Range Status   08/16/2021 51.3 42.7 - 76.0 % Final     Lymphocyte %   Date Value Ref Range Status   08/16/2021 37.5 19.6 - 45.3 % Final     Monocyte %   Date Value Ref Range Status   08/16/2021 8.9 5.0 - 12.0 % Final     Eosinophil %   Date Value Ref Range Status   08/16/2021 2.2 0.3 - 6.2 % Final     Basophil %   Date Value Ref Range Status   08/16/2021 0.1 0.0 - 1.5 % Final     Immature Grans %   Date Value Ref Range Status   03/10/2021 0.2 0.0 - 0.5 % Final     Neutrophils, Absolute   Date Value Ref Range Status   08/16/2021 3.78 1.70 - 7.00 10*3/mm3 Final     Lymphocytes, Absolute   Date Value Ref Range Status   08/16/2021 2.77 0.70 - 3.10 10*3/mm3 Final     Monocytes, Absolute   Date Value Ref Range Status   08/16/2021 0.66 0.10 - 0.90 10*3/mm3 Final     Eosinophils, Absolute   Date Value Ref Range Status   08/16/2021 0.16 0.00 - 0.40 10*3/mm3 Final     Basophils, Absolute   Date Value Ref Range Status   08/16/2021  0.01 0.00 - 0.20 10*3/mm3 Final     Immature Grans, Absolute   Date Value Ref Range Status   03/10/2021 0.02 0.00 - 0.05 10*3/mm3 Final     nRBC   Date Value Ref Range Status   03/10/2021 0.0 0.0 - 0.2 /100 WBC Final       Lab Results   Component Value Date    GLUCOSE 96 03/10/2021    BUN 19 03/10/2021    CREATININE 0.73 03/10/2021    EGFRIFNONA 81 03/10/2021    EGFRIFAFRI 85 11/10/2016    BCR 26.0 (H) 03/10/2021    K 4.1 03/10/2021    CO2 28.8 03/10/2021    CALCIUM 9.9 03/10/2021    PROTENTOTREF 6.5 08/10/2020    ALBUMIN 4.50 03/10/2021    LABIL2 1.2 08/10/2020    AST 22 03/10/2021    ALT 22 03/10/2021         Assessment/Plan     Malignant neoplasm of right female breast, unspecified estrogen receptor status, unspecified site of breast (CMS/HCC)  - CBC & Differential  - Mammo Screening Digital Tomosynthesis Bilateral With CAD  - DEXA Bone Density Axial    Monoclonal gammopathy  - CBC & Differential  - Comprehensive Metabolic Panel  - IgG  - IgA  - IgM  - Immunofixation, Serum  - Immunoglobulin Free LT Chains Blood  - Protein Elec + Interp, Serum  - Mammo Screening Digital Tomosynthesis Bilateral With CAD  - Comprehensive Metabolic Panel  - IgG  - IgA  - IgM  - Immunoglobulin Free LT Chains Blood    Malignant neoplasm of right breast in female, estrogen receptor positive, unspecified site of breast (CMS/HCC)  - CBC & Differential  - Mammo Screening Digital Tomosynthesis Bilateral With CAD    BRCA2 gene mutation positive  - CBC & Differential    Post-menopausal  - DEXA Bone Density Axial      ASSESSMENT:    1. T1b N0 M0 invasive ductal carcinoma, ER positive, NH positive, HER-2/hiro negative, grade III tumor.  Ongoing surveillance  2. Status post right lumpectomy with sentinel lymph node biopsy.  Reviewed  3. Status post adjuvant chemotherapy with Cytoxan and Taxotere for four cycles.    4. BRCA 2 sequencing gene with 3036 deletion 4 deleterious mutation.  Surveillance care  5. Status post right breast radiation.    6. Postmenopausal hormonal status.   7. Status post robotic complete hysterectomy and adnexectomy with benign findings.    8. Hypercholesterolemia on treatment.  Lipid panel 2/4/19 was normal.   9. Arimidex initiated December 2014, discontinued October 2016 due to side effects.  Aromasin initiated November 2016.  Remains on Aromasin.  Ongoing monitoring management: Continue the same  10. Iron deficiency anemia, resolved.   11. Monoclonal gammopathy of unknown significance.  On surveillance  12. Bilateral hip pain.  Resolved    PLAN:     She will continue Aromasin, Os-Quinton D.   MRI will be due March 2022  Bilateral screening mammogram due August 2021: We will go ahead and schedule today  Patient will also be due for bone density in September 2021.  We will go ahead and schedule now  Follow-up with dermatologist once a year: Reviewed  Follow up with Dr. Torres for EUS for pancreas due March 2022  Draw monoclonal labs today 8/16/2021  She will continue monthly breast self exam and call for lumps, nipple discharge, skin discoloration. A CMP will be done today.        She will follow up with Dr. Torres for pancreatic cancer screening.  We discussed use of pancreatic MRI alternating with EUS.          Patient is five years out from her breast cancer diagnosis.  I did discuss prophylactic mastectomies.  At this time patient wants to continue with breast conservation.  I will continue Aromasin for her for breast cancer prevention.  She has not had any significant toxicities on this treatment.             I have reviewed labs results, imaging, vitals, and medications with the patient today. Will follow up in 6 months with me or earlier as needed for problems         Patient verbalized understanding and is in agreement of the above plan.

## 2021-08-17 LAB
ALBUMIN SERPL ELPH-MCNC: 3.6 G/DL (ref 2.9–4.4)
ALBUMIN/GLOB SERPL: 1.3 {RATIO} (ref 0.7–1.7)
ALPHA1 GLOB SERPL ELPH-MCNC: 0.2 G/DL (ref 0–0.4)
ALPHA2 GLOB SERPL ELPH-MCNC: 0.9 G/DL (ref 0.4–1)
B-GLOBULIN SERPL ELPH-MCNC: 1.1 G/DL (ref 0.7–1.3)
GAMMA GLOB SERPL ELPH-MCNC: 0.5 G/DL (ref 0.4–1.8)
GLOBULIN SER CALC-MCNC: 2.7 G/DL (ref 2.2–3.9)
KAPPA LC FREE SER-MCNC: 15.9 MG/L (ref 3.3–19.4)
KAPPA LC FREE/LAMBDA FREE SER: 1.34 {RATIO} (ref 0.26–1.65)
LABORATORY COMMENT REPORT: NORMAL
LAMBDA LC FREE SERPL-MCNC: 11.9 MG/L (ref 5.7–26.3)
M PROTEIN SERPL ELPH-MCNC: NORMAL G/DL
PROT PATTERN SERPL ELPH-IMP: NORMAL
PROT SERPL-MCNC: 6.3 G/DL (ref 6–8.5)

## 2021-08-18 LAB
IGA SERPL-MCNC: 106 MG/DL (ref 87–352)
IGG SERPL-MCNC: 549 MG/DL (ref 586–1602)
IGM SERPL-MCNC: 166 MG/DL (ref 26–217)
PROT PATTERN SERPL IFE-IMP: ABNORMAL

## 2021-08-19 ENCOUNTER — APPOINTMENT (OUTPATIENT)
Dept: LAB | Facility: HOSPITAL | Age: 61
End: 2021-08-19

## 2021-08-26 RX ORDER — MELOXICAM 15 MG/1
TABLET ORAL
Qty: 90 TABLET | Refills: 0 | Status: SHIPPED | OUTPATIENT
Start: 2021-08-26 | End: 2021-11-30

## 2021-09-01 ENCOUNTER — HOSPITAL ENCOUNTER (OUTPATIENT)
Dept: BONE DENSITY | Facility: HOSPITAL | Age: 61
Discharge: HOME | End: 2021-09-01

## 2021-09-01 ENCOUNTER — HOSPITAL ENCOUNTER (OUTPATIENT)
Dept: MAMMOGRAPHY | Facility: HOSPITAL | Age: 61
Discharge: HOME | End: 2021-09-01

## 2021-09-01 DIAGNOSIS — D47.2 MONOCLONAL GAMMOPATHY: ICD-10-CM

## 2021-09-01 DIAGNOSIS — C50.911 MALIGNANT NEOPLASM OF RIGHT FEMALE BREAST, UNSPECIFIED ESTROGEN RECEPTOR STATUS, UNSPECIFIED SITE OF BREAST (HCC): ICD-10-CM

## 2021-09-01 DIAGNOSIS — C50.911 MALIGNANT NEOPLASM OF RIGHT BREAST IN FEMALE, ESTROGEN RECEPTOR POSITIVE, UNSPECIFIED SITE OF BREAST (HCC): ICD-10-CM

## 2021-09-01 DIAGNOSIS — Z17.0 MALIGNANT NEOPLASM OF RIGHT BREAST IN FEMALE, ESTROGEN RECEPTOR POSITIVE, UNSPECIFIED SITE OF BREAST (HCC): ICD-10-CM

## 2021-09-01 DIAGNOSIS — Z78.0 POST-MENOPAUSAL: ICD-10-CM

## 2021-09-01 PROCEDURE — 77067 SCR MAMMO BI INCL CAD: CPT

## 2021-09-01 PROCEDURE — 77063 BREAST TOMOSYNTHESIS BI: CPT

## 2021-09-01 PROCEDURE — 77080 DXA BONE DENSITY AXIAL: CPT

## 2021-09-02 DIAGNOSIS — N64.89 BREAST ASYMMETRY IN FEMALE: ICD-10-CM

## 2021-09-02 DIAGNOSIS — R92.8 ABNORMALITY OF RIGHT BREAST ON SCREENING MAMMOGRAM: Primary | ICD-10-CM

## 2021-09-03 ENCOUNTER — TELEPHONE (OUTPATIENT)
Dept: ONCOLOGY | Facility: CLINIC | Age: 61
End: 2021-09-03

## 2021-09-03 NOTE — TELEPHONE ENCOUNTER
Left message on identifying VM letting pt know that her mammogram showed asymmetry so ELIE Kitchen has ordered a right diagnostic mammogram and ultrasound. Callback number left if she has any questions.

## 2021-09-03 NOTE — TELEPHONE ENCOUNTER
----- Message from ELIE Brown sent at 9/2/2021  6:15 PM EDT -----  Regarding: MMG    Will you call patient and tell her that I am ordering a diagnostic mammogram right side and right breast ultrasound for an asymmetry (unequal or not the same)      TY     Fidelina BAEZA    IMPRESSION:  Right breast asymmetry. Recommend right diagnostic mammogram and  possible right breast ultrasound.

## 2021-09-10 DIAGNOSIS — C50.911 MALIGNANT NEOPLASM OF RIGHT FEMALE BREAST, UNSPECIFIED ESTROGEN RECEPTOR STATUS, UNSPECIFIED SITE OF BREAST (HCC): ICD-10-CM

## 2021-09-10 RX ORDER — EXEMESTANE 25 MG/1
TABLET ORAL
Qty: 90 TABLET | Refills: 3 | Status: SHIPPED | OUTPATIENT
Start: 2021-09-10 | End: 2022-08-30

## 2021-09-29 ENCOUNTER — HOSPITAL ENCOUNTER (OUTPATIENT)
Dept: ULTRASOUND IMAGING | Facility: HOSPITAL | Age: 61
Discharge: HOME | End: 2021-09-29

## 2021-09-29 ENCOUNTER — HOSPITAL ENCOUNTER (OUTPATIENT)
Dept: MAMMOGRAPHY | Facility: HOSPITAL | Age: 61
Discharge: HOME | End: 2021-09-29

## 2021-09-29 DIAGNOSIS — R92.8 ABNORMALITY OF RIGHT BREAST ON SCREENING MAMMOGRAM: ICD-10-CM

## 2021-09-29 DIAGNOSIS — N64.89 BREAST ASYMMETRY IN FEMALE: ICD-10-CM

## 2021-09-29 PROCEDURE — 77065 DX MAMMO INCL CAD UNI: CPT

## 2021-09-29 PROCEDURE — G0279 TOMOSYNTHESIS, MAMMO: HCPCS

## 2021-10-27 ENCOUNTER — OFFICE VISIT (OUTPATIENT)
Dept: FAMILY MEDICINE CLINIC | Facility: CLINIC | Age: 61
End: 2021-10-27

## 2021-10-27 VITALS
BODY MASS INDEX: 39.65 KG/M2 | WEIGHT: 210 LBS | HEIGHT: 61 IN | OXYGEN SATURATION: 97 % | HEART RATE: 103 BPM | SYSTOLIC BLOOD PRESSURE: 140 MMHG | TEMPERATURE: 96.9 F | RESPIRATION RATE: 16 BRPM | DIASTOLIC BLOOD PRESSURE: 80 MMHG

## 2021-10-27 DIAGNOSIS — I10 PRIMARY HYPERTENSION: ICD-10-CM

## 2021-10-27 DIAGNOSIS — M75.101 ROTATOR CUFF SYNDROME OF RIGHT SHOULDER: Primary | ICD-10-CM

## 2021-10-27 DIAGNOSIS — C50.911 MALIGNANT NEOPLASM OF RIGHT FEMALE BREAST, UNSPECIFIED ESTROGEN RECEPTOR STATUS, UNSPECIFIED SITE OF BREAST (HCC): ICD-10-CM

## 2021-10-27 PROCEDURE — 99213 OFFICE O/P EST LOW 20 MIN: CPT | Performed by: FAMILY MEDICINE

## 2021-10-27 NOTE — PROGRESS NOTES
"Chief Complaint  Hypothyroidism and Hypertension    Subjective          Sindy Martin presents to [facility]  For a 4 month follow up.    She was in for shoulder pain about 4 mos ago.   She had tendonitis.  We injected  Right shoulder with depo medrol.   She is doing well.    Hypothyroidism    Hypertension        Objective   Vital Signs:   /80 (BP Location: Left arm)   Pulse 103   Temp 96.9 °F (36.1 °C) (Infrared)   Resp 16   Ht 154.9 cm (61\")   Wt 95.3 kg (210 lb)   SpO2 97%   BMI 39.68 kg/m²     Physical Exam  Constitutional:       Appearance: Normal appearance. She is well-developed and normal weight.   HENT:      Head: Normocephalic and atraumatic.      Right Ear: Tympanic membrane, ear canal and external ear normal.      Left Ear: Tympanic membrane, ear canal and external ear normal.      Nose: Nose normal.      Mouth/Throat:      Mouth: Mucous membranes are moist.      Pharynx: Oropharynx is clear. No oropharyngeal exudate.   Eyes:      Extraocular Movements: Extraocular movements intact.      Conjunctiva/sclera: Conjunctivae normal.      Pupils: Pupils are equal, round, and reactive to light.   Cardiovascular:      Rate and Rhythm: Normal rate and regular rhythm.      Pulses: Normal pulses.      Heart sounds: Normal heart sounds.   Pulmonary:      Effort: Pulmonary effort is normal.      Breath sounds: Normal breath sounds.   Abdominal:      General: Bowel sounds are normal.      Palpations: Abdomen is soft.   Musculoskeletal:         General: Normal range of motion.      Cervical back: Normal range of motion and neck supple.   Skin:     General: Skin is warm and dry.   Neurological:      General: No focal deficit present.      Mental Status: She is alert and oriented to person, place, and time. Mental status is at baseline.   Psychiatric:         Mood and Affect: Mood normal.         Behavior: Behavior normal.         Thought Content: Thought content normal.   "       Judgment: Judgment normal.        Result Review :                 Assessment and Plan    Diagnoses and all orders for this visit:    1. Rotator cuff syndrome of right shoulder (Primary)  Assessment & Plan:  Doing well.  Full ROM.        2. Malignant neoplasm of right female breast, unspecified estrogen receptor status, unspecified site of breast (HCC)  Assessment & Plan:  7 years out on BRCA.  Right lumpectomy  BRCA-2 mutation.   MOUNA-BSOP      3. Primary hypertension      Follow Up   No follow-ups on file.  Patient was given instructions and counseling regarding her condition or for health maintenance advice. Please see specific information pulled into the AVS if appropriate.

## 2021-11-30 RX ORDER — MELOXICAM 15 MG/1
TABLET ORAL
Qty: 90 TABLET | Refills: 0 | Status: SHIPPED | OUTPATIENT
Start: 2021-11-30 | End: 2022-03-15

## 2021-12-09 RX ORDER — ATORVASTATIN CALCIUM 20 MG/1
TABLET, FILM COATED ORAL
Qty: 90 TABLET | Refills: 1 | Status: SHIPPED | OUTPATIENT
Start: 2021-12-09 | End: 2022-05-16

## 2022-02-11 NOTE — PROGRESS NOTES
Hematology/Oncology Outpatient Follow Up    PATIENT NAME:Sindy Martin  :1960  MRN: [id number]  PRIMARY CARE PHYSICIAN: Rubens Cali MD  REFERRING PHYSICIAN: Rubens Cali MD    Chief Complaint   Patient presents with   • Follow-up     Malignant neoplasm of right female breast        HISTORY OF PRESENT ILLNESS:     This is a 60-year-old female who was originally seen on 14 after she presented with early stage right breast cancer.  Please refer to the details of my notes for more information.   • She had an abnormal mammogram in May 2014.    • 6/10/14 - She underwent ultrasound guided localization of the right breast mass with sentinel lymph node injection, right lumpectomy.  Pathology revealed one sentinel lymph node negative for malignancy.  Tumor was strongly positive for ER, MS and HER-2/hiro was negative.  Tumor measured 1 cm in greatest dimension.  It was a grade III disease.  All the margins were negative.  Pathologic stage is [id number].    • 14 - Patient’s tumor was sent for Oncotype DX assay.  This returned with a recurrent score and validation study, she has a 12% chance of distant recurrence despite five years of Tamoxifen over the next 10 years.  On the validation study her ER was positive, MS was positive and HER-2/hiro was negative.  • 14 - Patient had BRCA 1 and 2 mutational analysis which returned with BRCA 2 deleterious mutation involving [id number] on the BRCA 2 sequencing gene.    • 2014 - She had FSH and serum estradiol levels, which are noted to be in the postmenopausal range.    • 14 - Patient was initiated on adjuvant chemotherapy with Taxotere and Cytoxan along with Neulasta.     • 10/6/14 - Patient completed 4th cycle of chemotherapy with Cytoxan and Taxotere.   • Patient was seen by Dr. Buck who performed excisional biopsy of a mole on the left forearm and this was benign from history.     • 14 - Patient completed whole right breast radiation  under the direction of Dr. Mcgrath.  She received total dose of 4256 cGy.  • Patient has been seen by Dr. Son who will perform her complete hysterectomy due to BRCA positivity after the first of the year.      • 12/23/14 - Patient had a bone density, which was essentially normal.    • December 2014 - Patient was initiated on Arimidex 1 mg every day as well as Os-Quinton D.   • April 2015 - Patient underwent total robotic hysterectomy with bilateral adnexectomy.  Pathology was benign.  I have requested for official report of the above procedure and path reports.  Patient has been seen by Dr. Son on 4/29/15.    • 5/19/15 - Port catheter was removed.   • 5/28/15 - Bilateral diagnostic mammogram which was essentially unremarkable except that patient has heterogenously dense breasts.  • 6/22/15 - Patient was seen by Dr. Guo.  Follow up in one year was recommended.    • Fasting lipid profile done which showed total cholesterol of 234, HDL of 42, triglyceride 237, elevated, and LDH was 145, also elevated.  BUN 20, creatinine 0.87 and LFTs were essentially unremarkable.  WBC 6.7, hemoglobin 12.4 and platelet count 197,000.    • 12/5/15 - Fasting lipid profile showed total cholesterol 133, HDL 50 and LDL 67, triglyceride 81.  BUN 22, creatinine 0.7.  • 2/24/16 - Bilateral breast MRI which showed post-op scar change on the right breast, but no MR finding to suggest malignancy.  She has a 4 mm enhancing lesion in the lower right breast at the inframammary fold.  Patient’s breast exam and skin evaluation was essentially unremarkable at the site where the 4 mm enhancing lesion was noted.  Patient also had no specific complaints.  • 3/22/16 - Anemia work up with reticulocyte count (N), B12 (N) 521, ferritin (N) 43, folate 20, haptoglobin 184, , BUN 11, creatinine 11.8.  SPEP with immunofixation assay showed IgG kappa monoclonal protein with M spike of 0.2 gm/dL.  Erythropoietin level (N) 22 [range 2-18].     • 3/31/16 - Quantitative immunoglobulins for IgA, IgG and IgM were normal.  Serum free light chains were essentially unremarkable.    • 4/5/16 - 24-hour urine electrophoresis which did not show any evidence of monoclonal gammopathy.   • 6/10/16 - Bilateral mammogram was essentially normal.  Follow up in one year was recommended.    • 6/20/16 - Serum transferrin receptor assay returned with high level at 5.2 [range 1.9-4.9].    • 6/27/16 - Urinalysis showed trace blood.  • 8/1/16 - Patient was seen by Dr. Torres and had EGD and colonoscopy.  EGD showed hiatal hernia, moderate to severe gastroduodenitis, but no ulcers seen.  No abnormalities seen on her colonoscopy.   • 9/12/16 - Chemistry panel:  BUN 14, creatinine 0.8, total protein (L) 5.8, alkaline phosphatase 94 [range 32-91].  Quantitative immunoglobulins for IgA (N) 101, IgG slightly low at 530 [range 600-1500], IgM (N) 195.  SPEP with immunofixation assay was essentially negative for any significant monoclonal protein.   • 9/19/16 - Urinalysis was negative for hematuria.   • 10/12/16 - Bone scan showed degenerative changes in the knees, otherwise negative.  No signs of metastatic disease.    • 10/10 & 10/17/16 - Patient received IV Injectafer.    • 1/16/17 - SPEP with immunofixation assay which did not show any evidence of monoclonal protein.   • 2/9/17 - Bilateral breast MRI showed no evidence of malignancy, but she had a skin lesion noted on the left breast.  Patient has since then seen Dr. Buck.  She has a follow up appointment in six months.    • 6/26/17 - Patient had bilateral diagnostic mammogram with tomosynthesis.  This revealed new linear pleomorphic microcalcifications in the medial right breast.  The left breast was unchanged.  Stereotactic biopsy was recommended.    • 7/12/17 - Patient underwent stereotactic biopsy of the right breast calcifications.  Pathology showed fibrous mastopathy with coarse stromal microcalcifications suggestive of  previous procedure.  Negative for carcinoma in situ and invasive carcinoma.  The result was concurrent with the image findings.  Follow up in one year was recommended.    • 9/11/17 - Since her last visit patient had labs including SPEP with immunofixation assay which showed a free lambda light chain identified.  IgA was 83.  IgG was 460.  IgM was 158.  BUN 17.  Creatinine 0.6.  Alkaline phosphatase 101.  Serum calcium 9.2.  CMP was normal.    • 9/16/17 - DEXA scan was normal.    • 2/15/18 - Bilateral breast MRI with stable post-op changes seen at the right breast, but no mass was seen in either the right or left breast.  Bilateral breast mammogram is due June 2018.   • 3/12/18 - CBC:  WBC 7.9, hemoglobin 13, platelet count 183,000.  Chemistry panel:  BUN 19.  Creatinine 0.7.  Alkaline phosphatase 92.  IgA 511.  IgG low at 518.  IgM normal 191.  Kappa lambda free light chains were normal.  SPEP with LAZARO was normal.    • 7/30/18 - Bilateral diagnostic mammogram with 3D with no mammographic evidence of malignancy.    • 9/18/18 - CT scan of the abdomen and pelvis which showed post-op changes, but no masses were identified in the abdomen.    • 2/4/19 - SPEP with LAZARO which did not show any monoclonal protein.  Total cholesterol was 116, triglycerides 69, LDL 66, HDL 43.  Chemistry:  BUN 17, creatinine 0.9, liver function tests are normal.    · 2/22/2019-patient had bilateral breast MRI, there was no MR evidence of malignancy in either breast.  · 8/29/19-patient had a DEXA scan, this was normal bone mineral density  · 8/29/2019-patient had bilateral mammogram which showed heterogeneously dense breast tissue.  Scar tissue was noted on the right breast consistent with previous surgery.  Follow-up in 1 year was recommended  · 2/19/2020 patient had bilateral breast MRI which was normal, follow-up in 1 year was recommended  · She is here today for routine follow-up visit.  Patient does not have any breast specific complaints  today  · March 2, 2021 patient had bilateral breast MRI which did not show any MR evidence of malignancy  · March 6, 2021 she had MRI of the abdomen which basically revealed mild fatty and atrophic pancreas no solid or cystic pancreatic mass was seen.  Patient to follow-up with Dr. Torres for EUS which would be due March 2022    Past Medical History:   Diagnosis Date   • Breast cancer (HCC)    • Hypertension        Past Surgical History:   Procedure Laterality Date   • APPENDECTOMY     • BREAST BIOPSY     • BREAST LUMPECTOMY     • CARPAL TUNNEL RELEASE     • CHOLECYSTECTOMY     • HYSTERECTOMY     • OOPHORECTOMY           Current Outpatient Medications:   •  aspirin 81 MG tablet, ASPIRIN 81 MG ORAL TABLET, Disp: , Rfl:   •  atorvastatin (LIPITOR) 20 MG tablet, TAKE 1 TABLET DAILY, Disp: 90 tablet, Rfl: 1  •  buPROPion XL (WELLBUTRIN XL) 150 MG 24 hr tablet, , Disp: , Rfl:   •  Calcium-Magnesium-Vitamin D - MG-MG-UNIT tablet sustained-release 24 hour, Take 1 capsule by mouth Daily., Disp: , Rfl:   •  escitalopram (LEXAPRO) 20 MG tablet, , Disp: , Rfl:   •  exemestane (AROMASIN) 25 MG chemo tablet, TAKE 1 TABLET DAILY, Disp: 90 tablet, Rfl: 3  •  loratadine (Claritin) 10 MG tablet, , Disp: , Rfl:   •  Magnesium 250 MG tablet, Take 500 mg by mouth Daily., Disp: , Rfl:   •  Melatonin 3 MG capsule, Take  by mouth., Disp: , Rfl:   •  meloxicam (MOBIC) 15 MG tablet, TAKE 1 TABLET BY MOUTH DAILY, Disp: 90 tablet, Rfl: 0  •  metFORMIN (GLUCOPHAGE) 500 MG tablet, TAKE 2 TABLETS TWICE A DAY, Disp: 360 tablet, Rfl: 3  •  multivitamin with minerals (Centrum Silver 50+Women) tablet tablet, , Disp: , Rfl:   •  NON FORMULARY, OSTEOBIFLEX 1 TAB DAILY, Disp: , Rfl:   •  pantoprazole (PROTONIX) 40 MG EC tablet, , Disp: , Rfl:   •  Synthroid 50 MCG tablet, TAKE 1 TABLET DAILY, Disp: 90 tablet, Rfl: 3    Allergies   Allergen Reactions   • Codeine Nausea And Vomiting   • Ibuprofen Anaphylaxis       Family History   Problem  Relation Age of Onset   • Lung cancer Maternal Aunt    • Uterine cancer Paternal Aunt 60   • Prostate cancer Paternal Uncle    • Melanoma Paternal Grandmother    • Prostate cancer Paternal Cousin    • Melanoma Paternal Cousin    • Esophageal cancer Paternal Cousin    • Thyroid cancer Maternal Cousin 40   • Esophageal cancer Maternal Cousin    • Ovarian cancer Maternal Cousin 50       Cancer-related family history includes Esophageal cancer in her maternal cousin and paternal cousin; Lung cancer in her maternal aunt; Melanoma in her paternal cousin and paternal grandmother; Ovarian cancer (age of onset: 50) in her maternal cousin; Prostate cancer in her paternal cousin and paternal uncle; Thyroid cancer (age of onset: 40) in her maternal cousin; Uterine cancer (age of onset: 60) in her paternal aunt.    Social History     Tobacco Use   • Smoking status: Never Smoker   • Smokeless tobacco: Never Used   Substance Use Topics   • Alcohol use: Yes     Comment: occasionally   • Drug use: Never       I have reviewed and confirmed the accuracy of the patient's history: Chief complaint, HPI and ROS as entered by the MA/LPN/RN. Munira Finney MD 02/14/22     SUBJECTIVE:    Patient has no specific complaints today.  She has not had any recent hospitalizations or admissions to the hospital.    She does not have any specific complaints today          REVIEW OF SYSTEMS:    Review of Systems   Constitutional: Negative for chills and fever.   HENT: Negative for ear pain, mouth sores, nosebleeds and sore throat.    Eyes: Negative for photophobia and visual disturbance.   Respiratory: Negative for wheezing and stridor.    Cardiovascular: Negative for chest pain and palpitations.   Gastrointestinal: Negative for abdominal pain, diarrhea, nausea and vomiting.   Endocrine: Negative for cold intolerance and heat intolerance.   Genitourinary: Negative for dysuria and hematuria.   Musculoskeletal: Negative for joint swelling and  "neck stiffness.   Skin: Negative for color change and rash.   Neurological: Negative for seizures and syncope.   Hematological: Negative for adenopathy.        No obvious bleeding   Psychiatric/Behavioral: Negative for agitation, confusion and hallucinations.     ROS as documented above    OBJECTIVE:    Vitals:    02/14/22 1322   BP: 150/92   Pulse: 99   Resp: 20   Temp: 97.1 °F (36.2 °C)   TempSrc: Infrared   Weight: 97.5 kg (215 lb)   Height: 154.9 cm (61\")   PainSc: 0-No pain       ECOG  (1) Restricted in physically strenuous activity, ambulatory and able to do work of light nature    Physical Exam   Constitutional: She is oriented to person, place, and time. No distress.   HENT:   Head: Normocephalic and atraumatic.   Eyes: Conjunctivae are normal. Right eye exhibits no discharge. Left eye exhibits no discharge. No scleral icterus.   Neck: No thyromegaly present.   Cardiovascular: Normal rate, regular rhythm and normal heart sounds. Exam reveals no gallop and no friction rub.   Pulmonary/Chest: Effort normal. No stridor. No respiratory distress. She has no wheezes. Right breast exhibits no inverted nipple, no mass, no nipple discharge, no skin change and no tenderness. Left breast exhibits no inverted nipple, no mass, no nipple discharge, no skin change and no tenderness. No breast bleeding. Breasts are symmetrical.   Bilateral breast exam is negative  Bilateral axillary exam is negative   Abdominal: Soft. Bowel sounds are normal. She exhibits no mass. There is no abdominal tenderness. There is no rebound and no guarding.   Musculoskeletal: Normal range of motion. No tenderness.   Lymphadenopathy:     She has no cervical adenopathy.   Neurological: She is alert and oriented to person, place, and time. She exhibits normal muscle tone.   Skin: Skin is warm. No rash noted. She is not diaphoretic. No erythema.   Psychiatric: Her behavior is normal.   Nursing note and vitals reviewed.    Physical exam as documented " above    I have reexamined the patient and the results are consistent with the previously documented exam. Munirawoody Finney MD     RECENT LABS    WBC   Date Value Ref Range Status   02/14/2022 7.21 3.40 - 10.80 10*3/mm3 Final     RBC   Date Value Ref Range Status   02/14/2022 4.58 3.77 - 5.28 10*6/mm3 Final     Hemoglobin   Date Value Ref Range Status   02/14/2022 12.1 12.0 - 15.9 g/dL Final     Hematocrit   Date Value Ref Range Status   02/14/2022 38.3 34.0 - 46.6 % Final     MCV   Date Value Ref Range Status   02/14/2022 83.6 79.0 - 97.0 fL Final     MCH   Date Value Ref Range Status   02/14/2022 26.4 (L) 26.6 - 33.0 pg Final     MCHC   Date Value Ref Range Status   02/14/2022 31.6 31.5 - 35.7 g/dL Final     RDW   Date Value Ref Range Status   02/14/2022 15.7 (H) 12.3 - 15.4 % Final     RDW-SD   Date Value Ref Range Status   02/14/2022 47.6 37.0 - 54.0 fl Final     MPV   Date Value Ref Range Status   02/14/2022 10.9 6.0 - 12.0 fL Final     Platelets   Date Value Ref Range Status   02/14/2022 201 140 - 450 10*3/mm3 Final     Neutrophil %   Date Value Ref Range Status   02/14/2022 56.3 42.7 - 76.0 % Final     Lymphocyte %   Date Value Ref Range Status   02/14/2022 32.2 19.6 - 45.3 % Final     Monocyte %   Date Value Ref Range Status   02/14/2022 10.0 5.0 - 12.0 % Final     Eosinophil %   Date Value Ref Range Status   02/14/2022 1.4 0.3 - 6.2 % Final     Basophil %   Date Value Ref Range Status   02/14/2022 0.1 0.0 - 1.5 % Final     Immature Grans %   Date Value Ref Range Status   03/10/2021 0.2 0.0 - 0.5 % Final     Neutrophils, Absolute   Date Value Ref Range Status   02/14/2022 4.06 1.70 - 7.00 10*3/mm3 Final     Lymphocytes, Absolute   Date Value Ref Range Status   02/14/2022 2.32 0.70 - 3.10 10*3/mm3 Final     Monocytes, Absolute   Date Value Ref Range Status   02/14/2022 0.72 0.10 - 0.90 10*3/mm3 Final     Eosinophils, Absolute   Date Value Ref Range Status   02/14/2022 0.10 0.00 - 0.40 10*3/mm3 Final      Basophils, Absolute   Date Value Ref Range Status   02/14/2022 0.01 0.00 - 0.20 10*3/mm3 Final     Immature Grans, Absolute   Date Value Ref Range Status   03/10/2021 0.02 0.00 - 0.05 10*3/mm3 Final     nRBC   Date Value Ref Range Status   03/10/2021 0.0 0.0 - 0.2 /100 WBC Final       Lab Results   Component Value Date    GLUCOSE 113 (H) 02/14/2022    BUN 19 02/14/2022    CREATININE 0.77 02/14/2022    EGFRIFNONA 76 02/14/2022    EGFRIFAFRI 85 11/10/2016    BCR 24.7 02/14/2022    K 4.1 02/14/2022    CO2 27.0 02/14/2022    CALCIUM 9.4 02/14/2022    PROTENTOTREF 6.3 08/16/2021    ALBUMIN 4.20 02/14/2022    LABIL2 1.3 08/16/2021    AST 23 02/14/2022    ALT 27 02/14/2022           ASSESSMENT:    1. T1b N0 M0 invasive ductal carcinoma, ER positive, CA positive, HER-2/hiro negative, grade III tumor.  Ongoing surveillance  2. Status post right lumpectomy with sentinel lymph node biopsy.  Reviewed  3. Status post adjuvant chemotherapy with Cytoxan and Taxotere for four cycles.    4. BRCA 2 sequencing gene with 3036 deletion 4 deleterious mutation.  Surveillance care  5. Status post right breast radiation.   6. Postmenopausal hormonal status.   7. Status post robotic complete hysterectomy and adnexectomy with benign findings.    8. Hypercholesterolemia on treatment.  Lipid panel 2/4/19 was normal.   9. Arimidex initiated December 2014, discontinued October 2016 due to side effects.  Aromasin initiated November 2016.  Remains on Aromasin.  Ongoing monitoring management: Continue the same  10. Iron deficiency anemia, resolved.   11. Monoclonal gammopathy of unknown significance.  Has been zoraida;l for past 2 years. Will discontinue checking.  12. Bilateral hip pain.  Resolved    PLAN:     She will continue Aromasin, Os-Quinton D.   MRI will be due March 2022: This has been ordered today  Bilateral screening mammogram will be due September 2022:   Patient will  be due for bone density in September 2023.    Follow-up with  dermatologist once a year: Reviewed  Follow up with Dr. Torres for EUS for pancreas due March 2022.  Referral placed  Monoclonal labs have been normal past 2 years therefore will discontinue checking at this time.  She will continue monthly breast self exam and call for lumps, nipple discharge, skin discoloration. A CMP will be done today.        She will follow up with Dr. Torres for pancreatic cancer screening.  We discussed use of pancreatic MRI alternating with EUS.          Patient is five years out from her breast cancer diagnosis.  I did discuss prophylactic mastectomies.  At this time patient wants to continue with breast conservation.  I will continue Aromasin for her for breast cancer prevention.  She has not had any significant toxicities on this treatment.             I have reviewed labs results, imaging, vitals, and medications with the patient today. Will follow up in 6 months with me or earlier as needed for problems         Patient verbalized understanding and is in agreement of the above plan.      I spent 30 total minutes, face-to-face, caring for Sindy today.  90% of this time involved counseling and/or coordination of care as documented within this note.

## 2022-02-14 ENCOUNTER — LAB (OUTPATIENT)
Dept: LAB | Facility: HOSPITAL | Age: 62
End: 2022-02-14

## 2022-02-14 ENCOUNTER — OFFICE VISIT (OUTPATIENT)
Dept: ONCOLOGY | Facility: CLINIC | Age: 62
End: 2022-02-14

## 2022-02-14 VITALS
SYSTOLIC BLOOD PRESSURE: 150 MMHG | HEART RATE: 99 BPM | DIASTOLIC BLOOD PRESSURE: 92 MMHG | TEMPERATURE: 97.1 F | WEIGHT: 215 LBS | HEIGHT: 61 IN | RESPIRATION RATE: 20 BRPM | BODY MASS INDEX: 40.59 KG/M2

## 2022-02-14 DIAGNOSIS — Z12.9 CANCER SCREENING: ICD-10-CM

## 2022-02-14 DIAGNOSIS — C50.911 MALIGNANT NEOPLASM OF RIGHT FEMALE BREAST, UNSPECIFIED ESTROGEN RECEPTOR STATUS, UNSPECIFIED SITE OF BREAST: Primary | ICD-10-CM

## 2022-02-14 LAB
ALBUMIN SERPL-MCNC: 4.2 G/DL (ref 3.5–5.2)
ALBUMIN/GLOB SERPL: 1.8 G/DL
ALP SERPL-CCNC: 100 U/L (ref 39–117)
ALT SERPL W P-5'-P-CCNC: 27 U/L (ref 1–33)
ANION GAP SERPL CALCULATED.3IONS-SCNC: 9 MMOL/L (ref 5–15)
AST SERPL-CCNC: 23 U/L (ref 1–32)
BASOPHILS # BLD AUTO: 0.01 10*3/MM3 (ref 0–0.2)
BASOPHILS NFR BLD AUTO: 0.1 % (ref 0–1.5)
BILIRUB SERPL-MCNC: 0.2 MG/DL (ref 0–1.2)
BUN SERPL-MCNC: 19 MG/DL (ref 8–23)
BUN/CREAT SERPL: 24.7 (ref 7–25)
CALCIUM SPEC-SCNC: 9.4 MG/DL (ref 8.6–10.5)
CHLORIDE SERPL-SCNC: 105 MMOL/L (ref 98–107)
CO2 SERPL-SCNC: 27 MMOL/L (ref 22–29)
CREAT SERPL-MCNC: 0.77 MG/DL (ref 0.57–1)
DEPRECATED RDW RBC AUTO: 47.6 FL (ref 37–54)
EOSINOPHIL # BLD AUTO: 0.1 10*3/MM3 (ref 0–0.4)
EOSINOPHIL NFR BLD AUTO: 1.4 % (ref 0.3–6.2)
ERYTHROCYTE [DISTWIDTH] IN BLOOD BY AUTOMATED COUNT: 15.7 % (ref 12.3–15.4)
GFR SERPL CREATININE-BSD FRML MDRD: 76 ML/MIN/1.73
GLOBULIN UR ELPH-MCNC: 2.4 GM/DL
GLUCOSE SERPL-MCNC: 113 MG/DL (ref 65–99)
HCT VFR BLD AUTO: 38.3 % (ref 34–46.6)
HGB BLD-MCNC: 12.1 G/DL (ref 12–15.9)
HOLD SPECIMEN: NORMAL
LYMPHOCYTES # BLD AUTO: 2.32 10*3/MM3 (ref 0.7–3.1)
LYMPHOCYTES NFR BLD AUTO: 32.2 % (ref 19.6–45.3)
MCH RBC QN AUTO: 26.4 PG (ref 26.6–33)
MCHC RBC AUTO-ENTMCNC: 31.6 G/DL (ref 31.5–35.7)
MCV RBC AUTO: 83.6 FL (ref 79–97)
MONOCYTES # BLD AUTO: 0.72 10*3/MM3 (ref 0.1–0.9)
MONOCYTES NFR BLD AUTO: 10 % (ref 5–12)
NEUTROPHILS NFR BLD AUTO: 4.06 10*3/MM3 (ref 1.7–7)
NEUTROPHILS NFR BLD AUTO: 56.3 % (ref 42.7–76)
PLATELET # BLD AUTO: 201 10*3/MM3 (ref 140–450)
PMV BLD AUTO: 10.9 FL (ref 6–12)
POTASSIUM SERPL-SCNC: 4.1 MMOL/L (ref 3.5–5.2)
PROT SERPL-MCNC: 6.6 G/DL (ref 6–8.5)
RBC # BLD AUTO: 4.58 10*6/MM3 (ref 3.77–5.28)
SODIUM SERPL-SCNC: 141 MMOL/L (ref 136–145)
WBC NRBC COR # BLD: 7.21 10*3/MM3 (ref 3.4–10.8)

## 2022-02-14 PROCEDURE — 85025 COMPLETE CBC W/AUTO DIFF WBC: CPT

## 2022-02-14 PROCEDURE — 36415 COLL VENOUS BLD VENIPUNCTURE: CPT

## 2022-02-14 PROCEDURE — 99214 OFFICE O/P EST MOD 30 MIN: CPT | Performed by: INTERNAL MEDICINE

## 2022-02-14 PROCEDURE — 80053 COMPREHEN METABOLIC PANEL: CPT

## 2022-03-08 ENCOUNTER — OFFICE (OUTPATIENT)
Dept: URBAN - METROPOLITAN AREA CLINIC 64 | Facility: CLINIC | Age: 62
End: 2022-03-08

## 2022-03-08 VITALS
WEIGHT: 222 LBS | DIASTOLIC BLOOD PRESSURE: 89 MMHG | SYSTOLIC BLOOD PRESSURE: 153 MMHG | HEIGHT: 61 IN | HEART RATE: 115 BPM

## 2022-03-08 DIAGNOSIS — K21.9 GASTRO-ESOPHAGEAL REFLUX DISEASE WITHOUT ESOPHAGITIS: ICD-10-CM

## 2022-03-08 PROCEDURE — 99203 OFFICE O/P NEW LOW 30 MIN: CPT | Performed by: INTERNAL MEDICINE

## 2022-03-08 RX ORDER — PANTOPRAZOLE 40 MG/1
TABLET, DELAYED RELEASE ORAL
Qty: 90 | Refills: 4 | Status: ACTIVE

## 2022-03-09 ENCOUNTER — HOSPITAL ENCOUNTER (OUTPATIENT)
Dept: MRI IMAGING | Facility: HOSPITAL | Age: 62
Discharge: HOME | End: 2022-03-09
Admitting: INTERNAL MEDICINE

## 2022-03-09 DIAGNOSIS — C50.911 MALIGNANT NEOPLASM OF RIGHT FEMALE BREAST, UNSPECIFIED ESTROGEN RECEPTOR STATUS, UNSPECIFIED SITE OF BREAST: ICD-10-CM

## 2022-03-09 PROCEDURE — A9579 GAD-BASE MR CONTRAST NOS,1ML: HCPCS | Performed by: INTERNAL MEDICINE

## 2022-03-09 PROCEDURE — 25010000002 GADOTERIDOL PER 1 ML: Performed by: INTERNAL MEDICINE

## 2022-03-09 PROCEDURE — 77049 MRI BREAST C-+ W/CAD BI: CPT

## 2022-03-09 RX ADMIN — GADOTERIDOL 20 ML: 279.3 INJECTION, SOLUTION INTRAVENOUS at 16:10

## 2022-03-15 RX ORDER — MELOXICAM 15 MG/1
TABLET ORAL
Qty: 90 TABLET | Refills: 0 | Status: SHIPPED | OUTPATIENT
Start: 2022-03-15 | End: 2022-06-17

## 2022-04-09 RX ORDER — PREDNISONE 20 MG/1
TABLET ORAL
Qty: 25 TABLET | Refills: 1 | Status: SHIPPED | OUTPATIENT
Start: 2022-04-09 | End: 2022-04-09 | Stop reason: SDUPTHER

## 2022-04-09 RX ORDER — PREDNISONE 20 MG/1
TABLET ORAL
Qty: 25 TABLET | Refills: 1 | Status: SHIPPED | OUTPATIENT
Start: 2022-04-09 | End: 2022-05-20

## 2022-04-19 DIAGNOSIS — M70.70 BURSITIS OF OTHER BURSA OF HIP, UNSPECIFIED LATERALITY: Primary | ICD-10-CM

## 2022-04-20 RX ORDER — LEVOTHYROXINE SODIUM 50 MCG
TABLET ORAL
Qty: 90 TABLET | Refills: 1 | Status: SHIPPED | OUTPATIENT
Start: 2022-04-20 | End: 2022-10-17

## 2022-04-26 ENCOUNTER — LAB (OUTPATIENT)
Dept: LAB | Facility: HOSPITAL | Age: 62
End: 2022-04-26

## 2022-04-26 LAB — SARS-COV-2 ORF1AB RESP QL NAA+PROBE: NOT DETECTED

## 2022-04-26 PROCEDURE — C9803 HOPD COVID-19 SPEC COLLECT: HCPCS

## 2022-04-26 PROCEDURE — U0004 COV-19 TEST NON-CDC HGH THRU: HCPCS

## 2022-04-28 ENCOUNTER — ON CAMPUS - OUTPATIENT (OUTPATIENT)
Dept: URBAN - METROPOLITAN AREA HOSPITAL 85 | Facility: HOSPITAL | Age: 62
End: 2022-04-28

## 2022-04-28 ENCOUNTER — HOSPITAL ENCOUNTER (OUTPATIENT)
Facility: HOSPITAL | Age: 62
Setting detail: HOSPITAL OUTPATIENT SURGERY
Discharge: HOME | End: 2022-04-28
Attending: INTERNAL MEDICINE | Admitting: INTERNAL MEDICINE

## 2022-04-28 ENCOUNTER — ANESTHESIA (OUTPATIENT)
Dept: GASTROENTEROLOGY | Facility: HOSPITAL | Age: 62
End: 2022-04-28

## 2022-04-28 ENCOUNTER — ANESTHESIA EVENT (OUTPATIENT)
Dept: GASTROENTEROLOGY | Facility: HOSPITAL | Age: 62
End: 2022-04-28

## 2022-04-28 VITALS
DIASTOLIC BLOOD PRESSURE: 83 MMHG | HEIGHT: 61 IN | WEIGHT: 218.26 LBS | RESPIRATION RATE: 19 BRPM | HEART RATE: 100 BPM | TEMPERATURE: 98.4 F | SYSTOLIC BLOOD PRESSURE: 168 MMHG | OXYGEN SATURATION: 100 % | BODY MASS INDEX: 41.21 KG/M2

## 2022-04-28 VITALS
RESPIRATION RATE: 11 BRPM | OXYGEN SATURATION: 100 % | DIASTOLIC BLOOD PRESSURE: 92 MMHG | SYSTOLIC BLOOD PRESSURE: 190 MMHG | HEART RATE: 108 BPM

## 2022-04-28 DIAGNOSIS — K22.70 BARRETT'S ESOPHAGUS WITHOUT DYSPLASIA: ICD-10-CM

## 2022-04-28 DIAGNOSIS — R93.5 ABNORMAL FINDINGS ON DIAGNOSTIC IMAGING OF OTHER ABDOMINAL R: ICD-10-CM

## 2022-04-28 DIAGNOSIS — K44.9 DIAPHRAGMATIC HERNIA WITHOUT OBSTRUCTION OR GANGRENE: ICD-10-CM

## 2022-04-28 DIAGNOSIS — Q45.3 ECTOPIC PANCREATIC TISSUE IN STOMACH: ICD-10-CM

## 2022-04-28 DIAGNOSIS — Q45.3 OTHER CONGENITAL MALFORMATIONS OF PANCREAS AND PANCREATIC DU: ICD-10-CM

## 2022-04-28 DIAGNOSIS — R93.5 ABNORMAL CT OF THE ABDOMEN: ICD-10-CM

## 2022-04-28 DIAGNOSIS — K31.7 POLYP OF STOMACH AND DUODENUM: ICD-10-CM

## 2022-04-28 LAB — GLUCOSE BLDC GLUCOMTR-MCNC: 115 MG/DL (ref 70–105)

## 2022-04-28 PROCEDURE — 88305 TISSUE EXAM BY PATHOLOGIST: CPT | Performed by: INTERNAL MEDICINE

## 2022-04-28 PROCEDURE — 43259 EGD US EXAM DUODENUM/JEJUNUM: CPT | Mod: 59 | Performed by: INTERNAL MEDICINE

## 2022-04-28 PROCEDURE — 82962 GLUCOSE BLOOD TEST: CPT

## 2022-04-28 PROCEDURE — 25010000002 PROPOFOL 10 MG/ML EMULSION: Performed by: STUDENT IN AN ORGANIZED HEALTH CARE EDUCATION/TRAINING PROGRAM

## 2022-04-28 PROCEDURE — 43239 EGD BIOPSY SINGLE/MULTIPLE: CPT | Performed by: INTERNAL MEDICINE

## 2022-04-28 RX ORDER — SODIUM CHLORIDE 0.9 % (FLUSH) 0.9 %
10 SYRINGE (ML) INJECTION AS NEEDED
Status: DISCONTINUED | OUTPATIENT
Start: 2022-04-28 | End: 2022-04-28 | Stop reason: HOSPADM

## 2022-04-28 RX ORDER — PROPOFOL 10 MG/ML
VIAL (ML) INTRAVENOUS AS NEEDED
Status: DISCONTINUED | OUTPATIENT
Start: 2022-04-28 | End: 2022-04-28 | Stop reason: SURG

## 2022-04-28 RX ORDER — SODIUM CHLORIDE 0.9 % (FLUSH) 0.9 %
10 SYRINGE (ML) INJECTION EVERY 12 HOURS SCHEDULED
Status: DISCONTINUED | OUTPATIENT
Start: 2022-04-28 | End: 2022-04-28 | Stop reason: HOSPADM

## 2022-04-28 RX ORDER — SODIUM CHLORIDE 9 MG/ML
9 INJECTION, SOLUTION INTRAVENOUS CONTINUOUS
Status: DISCONTINUED | OUTPATIENT
Start: 2022-04-28 | End: 2022-04-28 | Stop reason: HOSPADM

## 2022-04-28 RX ORDER — ONDANSETRON 2 MG/ML
4 INJECTION INTRAMUSCULAR; INTRAVENOUS ONCE AS NEEDED
Status: DISCONTINUED | OUTPATIENT
Start: 2022-04-28 | End: 2022-04-28 | Stop reason: HOSPADM

## 2022-04-28 RX ORDER — SODIUM CHLORIDE 9 MG/ML
INJECTION, SOLUTION INTRAVENOUS CONTINUOUS PRN
Status: DISCONTINUED | OUTPATIENT
Start: 2022-04-28 | End: 2022-04-28 | Stop reason: SURG

## 2022-04-28 RX ORDER — LIDOCAINE HYDROCHLORIDE 10 MG/ML
INJECTION, SOLUTION EPIDURAL; INFILTRATION; INTRACAUDAL; PERINEURAL AS NEEDED
Status: DISCONTINUED | OUTPATIENT
Start: 2022-04-28 | End: 2022-04-28 | Stop reason: SURG

## 2022-04-28 RX ADMIN — LIDOCAINE HYDROCHLORIDE 50 MG: 10 INJECTION, SOLUTION EPIDURAL; INFILTRATION; INTRACAUDAL; PERINEURAL at 14:20

## 2022-04-28 RX ADMIN — PROPOFOL 450 MG: 10 INJECTION, EMULSION INTRAVENOUS at 14:24

## 2022-04-28 RX ADMIN — SODIUM CHLORIDE 9 ML/HR: 9 INJECTION, SOLUTION INTRAVENOUS at 13:09

## 2022-04-28 RX ADMIN — SODIUM CHLORIDE: 0.9 INJECTION, SOLUTION INTRAVENOUS at 14:12

## 2022-04-28 NOTE — ANESTHESIA PREPROCEDURE EVALUATION
Anesthesia Evaluation     Patient summary reviewed and Nursing notes reviewed   history of anesthetic complications:  NPO Solid Status: > 8 hours  NPO Liquid Status: > 8 hours           Airway   Mallampati: I  TM distance: >3 FB  Neck ROM: full  No difficulty expected  Dental - normal exam     Pulmonary - negative pulmonary ROS and normal exam   Cardiovascular - normal exam    (+) hypertension, hyperlipidemia,       Neuro/Psych  (+) syncope, numbness,    GI/Hepatic/Renal/Endo    (+) morbid obesity, GERD,  thyroid problem     Musculoskeletal (-) negative ROS    Abdominal  - normal exam    Bowel sounds: normal.   Substance History - negative use     OB/GYN negative ob/gyn ROS         Other      history of cancer                    Anesthesia Plan    ASA 3     MAC   (AVAPS)  intravenous induction     Anesthetic plan, all risks, benefits, and alternatives have been provided, discussed and informed consent has been obtained with: patient.        CODE STATUS:

## 2022-04-28 NOTE — ANESTHESIA POSTPROCEDURE EVALUATION
Patient: Sindy Martin    Procedure Summary     Date: 04/28/22 Room / Location: [facility] ENDOSCOPY 2 / [facility] ENDOSCOPY    Anesthesia Start: 1409 Anesthesia Stop: 1445    Procedure: EUS WITH BIOPSY (N/A ) Diagnosis:       Abnormal CT of the abdomen      Ectopic pancreatic tissue in stomach      (Abnormal CT of the abdomen [R93.5])      (Ectopic pancreatic tissue in stomach [Q45.3])    Surgeons: Roldan Mayer MD Provider: Tito Robles MD    Anesthesia Type: MAC ASA Status: 3          Anesthesia Type: MAC    Vitals  Vitals Value Taken Time   /100 04/28/22 1501   Temp     Pulse 99 04/28/22 1505   Resp 19 04/28/22 1445   SpO2 96 % 04/28/22 1505   Vitals shown include unvalidated device data.        Post Anesthesia Care and Evaluation    Patient location during evaluation: PACU  Patient participation: complete - patient participated  Level of consciousness: awake  Pain scale: See nurse's notes for pain score.  Pain management: adequate  Airway patency: patent  Anesthetic complications: No anesthetic complications  PONV Status: none  Cardiovascular status: acceptable  Respiratory status: acceptable  Hydration status: acceptable    Comments: Patient seen and examined postoperatively; vital signs stable; SpO2 greater than or equal to 90%; cardiopulmonary status stable; nausea/vomiting adequately controlled; pain adequately controlled; no apparent anesthesia complications; patient discharged from anesthesia care when discharge criteria were met

## 2022-05-02 ENCOUNTER — TELEPHONE (OUTPATIENT)
Dept: FAMILY MEDICINE CLINIC | Facility: CLINIC | Age: 62
End: 2022-05-02

## 2022-05-02 DIAGNOSIS — G62.9 NEUROPATHY: Primary | ICD-10-CM

## 2022-05-02 LAB
LAB AP CASE REPORT: NORMAL
PATH REPORT.FINAL DX SPEC: NORMAL
PATH REPORT.GROSS SPEC: NORMAL

## 2022-05-02 RX ORDER — PREGABALIN 25 MG/1
CAPSULE ORAL
Qty: 60 CAPSULE | Refills: 1 | Status: SHIPPED | OUTPATIENT
Start: 2022-05-02 | End: 2022-05-19 | Stop reason: SDUPTHER

## 2022-05-02 NOTE — TELEPHONE ENCOUNTER
Gave message to patient and put her on PMJ's schedule for 5/11/2022 at 4:30pm.  Patient said she needs something for the pain.  She is taking Aleve around the clock and that is not helping.  Said she is almost done with the steroids you gave her for her legs.  Can you send something in for patient for pain until she sees you on 5/11?  She feels it might be bursitis or something like that.

## 2022-05-02 NOTE — TELEPHONE ENCOUNTER
Caller: Sindy Martin    Relationship to patient: Self    Best call back number: [phone]    Chief complaint: SHOULDER PAIN-2 WEEKS    Type of visit: OFFICE VISIT    Requested date: 5/2/22-DR TURNER ONLY     Additional notes:CAN WE WORK PATIENT IN?

## 2022-05-02 NOTE — TELEPHONE ENCOUNTER
Tell Sindy that I called in Lyrica.  I think she is having a neuropathy and a nerve that comes out of her neck and goes down the inside of her scapula.  I want her to try the Lyrica by titrating the dose up over period a couple of weeks.  By the time she gets in the office to see me we should have some idea of on the right track

## 2022-05-11 ENCOUNTER — OFFICE VISIT (OUTPATIENT)
Dept: FAMILY MEDICINE CLINIC | Facility: CLINIC | Age: 62
End: 2022-05-11

## 2022-05-11 VITALS
OXYGEN SATURATION: 97 % | DIASTOLIC BLOOD PRESSURE: 88 MMHG | WEIGHT: 218 LBS | BODY MASS INDEX: 41.16 KG/M2 | SYSTOLIC BLOOD PRESSURE: 154 MMHG | HEART RATE: 94 BPM | TEMPERATURE: 96.8 F | RESPIRATION RATE: 16 BRPM | HEIGHT: 61 IN

## 2022-05-11 DIAGNOSIS — M54.12 CERVICAL RADICULOPATHY: ICD-10-CM

## 2022-05-11 DIAGNOSIS — G58.9 MONONEUROPATHY: Primary | ICD-10-CM

## 2022-05-11 PROCEDURE — 99213 OFFICE O/P EST LOW 20 MIN: CPT | Performed by: FAMILY MEDICINE

## 2022-05-11 RX ORDER — OXYCODONE HYDROCHLORIDE AND ACETAMINOPHEN 5; 325 MG/1; MG/1
1 TABLET ORAL NIGHTLY
Qty: 15 TABLET | Refills: 0 | Status: SHIPPED | OUTPATIENT
Start: 2022-05-11 | End: 2022-05-26

## 2022-05-16 RX ORDER — ATORVASTATIN CALCIUM 20 MG/1
TABLET, FILM COATED ORAL
Qty: 90 TABLET | Refills: 1 | Status: SHIPPED | OUTPATIENT
Start: 2022-05-16 | End: 2022-11-28

## 2022-05-19 ENCOUNTER — TELEPHONE (OUTPATIENT)
Dept: FAMILY MEDICINE CLINIC | Facility: CLINIC | Age: 62
End: 2022-05-19

## 2022-05-19 DIAGNOSIS — G62.9 NEUROPATHY: ICD-10-CM

## 2022-05-19 RX ORDER — PREGABALIN 50 MG/1
CAPSULE ORAL
Qty: 100 CAPSULE | Refills: 2 | Status: SHIPPED | OUTPATIENT
Start: 2022-05-19 | End: 2022-07-18

## 2022-05-19 NOTE — TELEPHONE ENCOUNTER
Caller: Sindy Martin    Relationship: Self    Best call back number: [phone]    What medication are you requesting: PREGABALIN 26 MG SIX TIMES A DAY    What are your current symptoms: PAIN    How long have you been experiencing symptoms: N/A    Have you had these symptoms before:    [x] Yes  [] No    Have you been treated for these symptoms before:   [x] Yes  [] No    If a prescription is needed, what is your preferred pharmacy and phone number:  [facility] [id number] [facility] [address] AT [facility] - [phone]  - [phone]   [phone]    Additional notes:    PATIENT CALLED AND SAID SHE WENT TO FILL HER PREGABALIN BUT WAS TOLD SHE WAS FILLING IT TOO SOON    THE PATIENT SAID DR. TURNER TOLD HER TO INCREASE HER DOSAGE AND SHE HAS BEEN TAKING 6 OF THE 25 MG TABLETS A DAY    SHE IS WANTING A NEW PRESCRIPTION SENT IN FOR 6, 25 MG TABLETS A DAY

## 2022-05-20 ENCOUNTER — TELEPHONE (OUTPATIENT)
Dept: FAMILY MEDICINE CLINIC | Facility: CLINIC | Age: 62
End: 2022-05-20

## 2022-05-20 ENCOUNTER — OFFICE VISIT (OUTPATIENT)
Dept: ORTHOPEDIC SURGERY | Facility: CLINIC | Age: 62
End: 2022-05-20

## 2022-05-20 ENCOUNTER — PATIENT MESSAGE (OUTPATIENT)
Dept: FAMILY MEDICINE CLINIC | Facility: CLINIC | Age: 62
End: 2022-05-20

## 2022-05-20 VITALS
BODY MASS INDEX: 41.35 KG/M2 | HEART RATE: 88 BPM | HEIGHT: 61 IN | WEIGHT: 219 LBS | SYSTOLIC BLOOD PRESSURE: 171 MMHG | DIASTOLIC BLOOD PRESSURE: 88 MMHG

## 2022-05-20 DIAGNOSIS — M25.561 ACUTE PAIN OF RIGHT KNEE: ICD-10-CM

## 2022-05-20 DIAGNOSIS — M17.11 PRIMARY OSTEOARTHRITIS OF RIGHT KNEE: Primary | ICD-10-CM

## 2022-05-20 PROCEDURE — 99203 OFFICE O/P NEW LOW 30 MIN: CPT | Performed by: ORTHOPAEDIC SURGERY

## 2022-05-20 NOTE — TELEPHONE ENCOUNTER
----- Message from Sindy Martin sent at 5/20/2022 12:02 PM EDT -----  Regarding: Refill on Pregamblin  Dr. Cali, on May 19  I was trying to get a refill on my pregamblin, but the pharmacy told me that it was too soon.  I told them that you had instructed me to increase the dosage until I got up to 175 and I had been doing that and that is why I ran out of medication so soon.  The pharmacist told me that you would have to call in a new script since you had changed the instructions and dosage.   I called your office around 4:30 on the 5/19/2022 , while I was still at the pharmacy and talked with Sonia (?) and told her I needed a new script.  She was going to try and get it to you before the end of the day.  I waited at [facility] until the pharmacy closed in case they received the new script.  They didn't.  I stopped in at [facility] this morning (5-) to see if they had a new script and refill.  They said they didn't have anything.  I was up to 150 mg. per day and I was working my way to 175 mg. per day.  The increase in dosage has helped but the pain is still there,  just not   as intense.  I haven't had any medication  since the morning of  the 19th.   I'm scheduled to have my bone scan on Monday, May 23rd.

## 2022-05-20 NOTE — PROGRESS NOTES
General Exam    Patient: Sindy Martin    YOB: 1960    Medical Record Number: [id number]    Chief Complaints: Right knee pain    History of Present Illness:     61 y.o. female patient who presents for Elestrin right knee pain.  Patient states over the past 6 weeks she noticed some more lateral sided pain seems to radiate down the lateral part of her leg some denies any distal numbness or tingling.  No injuries.  States she is tried some prednisone from her primary doctor that it helped initially.  She takes Aleve which helps some.  Really she does okay with normal daily activities exist at night she noticed some achiness is the way she describes the discomfort.    Denies any numbness or tingling.  Denies any fevers, cough or shortness of breath.    Allergies:   Allergies   Allergen Reactions   • Codeine Nausea And Vomiting   • Ibuprofen Anaphylaxis       Home Medications:      Current Outpatient Medications:   •  aspirin 81 MG tablet, Take 81 mg by mouth Daily., Disp: , Rfl:   •  atorvastatin (LIPITOR) 20 MG tablet, TAKE 1 TABLET DAILY, Disp: 90 tablet, Rfl: 1  •  buPROPion XL (WELLBUTRIN XL) 150 MG 24 hr tablet, Take 150 mg by mouth Every Morning., Disp: , Rfl:   •  Calcium-Magnesium-Vitamin D - MG-MG-UNIT tablet sustained-release 24 hour, Take 1 capsule by mouth Daily., Disp: , Rfl:   •  escitalopram (LEXAPRO) 20 MG tablet, Take 20 mg by mouth Daily., Disp: , Rfl:   •  exemestane (AROMASIN) 25 MG chemo tablet, TAKE 1 TABLET DAILY (Patient taking differently: Take 25 mg by mouth Daily.), Disp: 90 tablet, Rfl: 3  •  loratadine (CLARITIN) 10 MG tablet, Take 10 mg by mouth Daily., Disp: , Rfl:   •  Magnesium 250 MG tablet, Take 500 mg by mouth Daily., Disp: , Rfl:   •  Melatonin 3 MG capsule, Take  by mouth., Disp: , Rfl:   •  meloxicam (MOBIC) 15 MG tablet, TAKE 1 TABLET BY MOUTH DAILY (Patient taking differently: Take 15 mg by mouth Daily.), Disp: 90 tablet, Rfl: 0  •  metFORMIN  (GLUCOPHAGE) 500 MG tablet, TAKE 2 TABLETS TWICE A DAY, Disp: 360 tablet, Rfl: 3  •  multivitamin with minerals tablet tablet, Take 1 tablet by mouth Daily., Disp: , Rfl:   •  NON FORMULARY, OSTEOBIFLEX 1 TAB DAILY, Disp: , Rfl:   •  oxyCODONE-acetaminophen (Percocet) 5-325 MG per tablet, Take 1 tablet by mouth Every Night for 15 days., Disp: 15 tablet, Rfl: 0  •  pantoprazole (PROTONIX) 40 MG EC tablet, Take 40 mg by mouth Daily., Disp: , Rfl:   •  pregabalin (Lyrica) 50 MG capsule, Take one tablet three times a day.   After 5 days take one AM, one afternoon, two HS.   After 5 days do two AM, on afternoon and two HS.  Stay on this dose and call Dr. Cali, Disp: 100 capsule, Rfl: 2  •  Synthroid 50 MCG tablet, TAKE 1 TABLET DAILY (Patient taking differently: Take 50 mcg by mouth Every Morning.), Disp: 90 tablet, Rfl: 1    Past Medical History:   Diagnosis Date   • Breast cancer (HCC)    • Hypertension    • PONV (postoperative nausea and vomiting)        Past Surgical History:   Procedure Laterality Date   • APPENDECTOMY     • BREAST BIOPSY     • BREAST LUMPECTOMY     • CARPAL TUNNEL RELEASE     • CHOLECYSTECTOMY     • HYSTERECTOMY     • OOPHORECTOMY     • UPPER ENDOSCOPIC ULTRASOUND W/ FNA N/A 4/28/2022    Procedure: EUS WITH BIOPSY;  Surgeon: Roldan Mayer MD;  Location: [facility] ENDOSCOPY;  Service: Gastroenterology;  Laterality: N/A;  HIATEL HERNIA, GASTRIC POLYP       Social History     Occupational History   • Not on file   Tobacco Use   • Smoking status: Never Smoker   • Smokeless tobacco: Never Used   Vaping Use   • Vaping Use: Never used   Substance and Sexual Activity   • Alcohol use: Yes     Comment: occasionally   • Drug use: Never   • Sexual activity: Defer      Social History     Social History Narrative   • Not on file       Family History   Problem Relation Age of Onset   • Lung cancer Maternal Aunt    • Uterine cancer Paternal Aunt 60   • Prostate cancer Paternal Uncle    • Melanoma Paternal  "Grandmother    • Prostate cancer Paternal Cousin    • Melanoma Paternal Cousin    • Esophageal cancer Paternal Cousin    • Thyroid cancer Maternal Cousin 40   • Esophageal cancer Maternal Cousin    • Ovarian cancer Maternal Cousin 50       Review of Systems:      Constitutional: Denies fever, shaking or chills         All other pertinent positives and negatives as noted above in HPI.    Physical Exam: 61 y.o. female    Vitals:    05/20/22 0958   BP: 171/88   Pulse: 88   Weight: 99.3 kg (219 lb)   Height: 154.9 cm (60.98\")       General:  Patient is awake and alert.  Appears in no acute distress or discomfort.        Musculoskeletal/Extremities:    Right lower extremity examined full hip and knee range of motion no tenderness palpation.  Strength and motor propria and sensory intact.  No tenderness along the joint line.  No effusion noted.  Patient examined seated position ambulates with normal gait unassisted.         Radiology:       4 views of the right knee taken reviewed including AP, lateral, PA notch and sunrise to evaluate the patient's complaint/s.    Knee films show some degenerative changes in all compartments however on the AP it appears more the medial compartment but has patient transitions to AP type flexion view there is some narrowing of the lateral compartment.  Early bone sclerosis noted.  Bipartite patella noted.  No acute fractures noted.    AP pelvis shows some mild degenerative changes bilaterally.  Also some lumbar degenerative changes as well.     No imaging for comparison.    Assessment: Right knee osteoarthritis      Plan:      Discussed the findings with the patient I feel like this might be more arthritic pain in nature given her description.  I think continue some anti-inflammatories and try some anti-inflammatory gel could be beneficial.  She would like to hold on any therapy at this time but she will call we will order some.  If symptoms or not improving earlier change instruct her to " follow back up for evaluation.  Also instructed her to continue working on range of motion and staying active and her weight and diet.           We will plan for follow up as needed.    All questions were answered.  Patient understands and agrees with the plan.    Freddy Duran MD    05/20/2022    CC to Rubens Cali MD

## 2022-05-20 NOTE — TELEPHONE ENCOUNTER
Светлана is shows he sent it at 9:50 pm   She needs to check again with her pharmacy     As they were closed then   And  If needed he has to send this in  I cant send it

## 2022-05-23 ENCOUNTER — HOSPITAL ENCOUNTER (OUTPATIENT)
Dept: NUCLEAR MEDICINE | Facility: HOSPITAL | Age: 62
Discharge: HOME | End: 2022-05-23

## 2022-05-23 DIAGNOSIS — M54.12 CERVICAL RADICULOPATHY: ICD-10-CM

## 2022-05-23 DIAGNOSIS — G58.9 MONONEUROPATHY: ICD-10-CM

## 2022-05-23 PROCEDURE — 0 TECHNETIUM MEDRONATE KIT: Performed by: FAMILY MEDICINE

## 2022-05-23 PROCEDURE — 78306 BONE IMAGING WHOLE BODY: CPT

## 2022-05-23 PROCEDURE — A9503 TC99M MEDRONATE: HCPCS | Performed by: FAMILY MEDICINE

## 2022-05-23 RX ORDER — TC 99M MEDRONATE 20 MG/10ML
25.8 INJECTION, POWDER, LYOPHILIZED, FOR SOLUTION INTRAVENOUS
Status: COMPLETED | OUTPATIENT
Start: 2022-05-23 | End: 2022-05-23

## 2022-05-23 RX ADMIN — TC 99M MEDRONATE 25.8 MILLICURIE: 20 INJECTION, POWDER, LYOPHILIZED, FOR SOLUTION INTRAVENOUS at 11:24

## 2022-06-02 NOTE — PROGRESS NOTES
CHIEF COMPLAINT  Upper and mid back pain/ Mononeuropathy left upper back -medial scapula area .       Subjective   Sindy Martin is a 61 y.o. female who was referred by Rubens Millard MD  to our pain management clinic for consultation, evaluation and treatment of upper and mid back pain.   Her pain has been intermittently present for several years but starting pain continues since 4/26/2022.  It was initially improved with gabapentin in March 2021 and then she had another severe episode in April 2022 but has been much better since taking Lyrica.  Denies any significant injuries but states that she went for upper EUS and they had her laying on her left side and after she got up from EUS she started having throbbing in her left elbow.  She has tried Aleve, Lyrica, prednisone lidocaine patches with some relief.  Denies any rash or history of shingles.  History of breast cancer diagnosed in 2014.  She has been also prescribed capsicin cream and was started on Percocet at nighttime to help.  She has been also referred to neurology Dr. Clement Lang.      Left shoulder blade pain is 1/10 on VAS, at maximum is /10. Pain is throbbing in nature. Denies any numbness, tingling or burning. Pain is not referred. The pain is intermittent. The pain is improved by Lyrica. The pain is worse with nothing in specific. She has some triceps and left arm tingling. Hx of neck issues and has used traction previously. No history of shingles or rash around that area. Last severe episode was 1 month ago. Denies any neck or shoulder pain.     PHQ-9- 3  SOAPP- 4    PMH:   DM-2, history of breast cancer s/p lumpectomy and chemo, obesity, hypertension, GERD, rotator cuff syndrome, s/p left carpal tunnel release  2017    Current Medications:   Meloxicam 15 mg  Lyrica 150 mg daily (200 mg shaking, memory issues), confusion).   Percocet 5 mg nightly  Bupropion  Lexapro  Magnesium        Past Medications:  Gabapentin - made her shaky with  higher dose     Past Modalities:  TENS:       yes          Physical Therapy Within The Last 6 Months     no  Psychotherapy     no  Massage Therapy      no    Patient Complains Of:  Uro-Fecal Incontinence no  Weight Gain/Loss  no  Fever/Chills   no  Weakness   no      PEG Assessment   What number best describes your pain on average in the past week?2  What number best describes how, during the past week, pain has interfered with your enjoyment of life?2  What number best describes how, during the past week, pain has interfered with your general activity?  2        Current Outpatient Medications:   •  aspirin 81 MG tablet, Take 81 mg by mouth Daily., Disp: , Rfl:   •  atorvastatin (LIPITOR) 20 MG tablet, TAKE 1 TABLET DAILY, Disp: 90 tablet, Rfl: 1  •  buPROPion XL (WELLBUTRIN XL) 150 MG 24 hr tablet, Take 150 mg by mouth Every Morning., Disp: , Rfl:   •  Calcium-Magnesium-Vitamin D - MG-MG-UNIT tablet sustained-release 24 hour, Take 1 capsule by mouth Daily., Disp: , Rfl:   •  escitalopram (LEXAPRO) 20 MG tablet, Take 20 mg by mouth Daily., Disp: , Rfl:   •  exemestane (AROMASIN) 25 MG chemo tablet, TAKE 1 TABLET DAILY (Patient taking differently: Take 25 mg by mouth Daily.), Disp: 90 tablet, Rfl: 3  •  loratadine (CLARITIN) 10 MG tablet, Take 10 mg by mouth Daily., Disp: , Rfl:   •  Magnesium 250 MG tablet, Take 500 mg by mouth Daily., Disp: , Rfl:   •  Melatonin 3 MG capsule, Take  by mouth., Disp: , Rfl:   •  meloxicam (MOBIC) 15 MG tablet, TAKE 1 TABLET BY MOUTH DAILY (Patient taking differently: Take 15 mg by mouth Daily.), Disp: 90 tablet, Rfl: 0  •  metFORMIN (GLUCOPHAGE) 500 MG tablet, TAKE 2 TABLETS TWICE A DAY, Disp: 360 tablet, Rfl: 3  •  multivitamin with minerals tablet tablet, Take 1 tablet by mouth Daily., Disp: , Rfl:   •  NON FORMULARY, OSTEOBIFLEX 1 TAB DAILY, Disp: , Rfl:   •  pantoprazole (PROTONIX) 40 MG EC tablet, Take 40 mg by mouth Daily., Disp: , Rfl:   •  pregabalin (Lyrica) 50  MG capsule, Take one tablet three times a day.   After 5 days take one AM, one afternoon, two HS.   After 5 days do two AM, on afternoon and two HS.  Stay on this dose and call Dr. Cali, Disp: 100 capsule, Rfl: 2  •  Synthroid 50 MCG tablet, TAKE 1 TABLET DAILY (Patient taking differently: Take 50 mcg by mouth Every Morning.), Disp: 90 tablet, Rfl: 1    The following portions of the patient's history were reviewed and updated as appropriate: allergies, current medications, past family history, past medical history, past social history, past surgical history, and problem list.      REVIEW OF PERTINENT MEDICAL DATA    Past Medical History:   Diagnosis Date   • Arthritis    • Breast cancer (HCC)    • Hearing loss    • Hypertension    • Hypertension    • Low back pain    • Migraine    • PONV (postoperative nausea and vomiting)    • Pre-diabetes    • Thyroid disease      Past Surgical History:   Procedure Laterality Date   • APPENDECTOMY     • BREAST BIOPSY     • BREAST LUMPECTOMY     • CARPAL TUNNEL RELEASE     • CHOLECYSTECTOMY     • HYSTERECTOMY     • OOPHORECTOMY     • UPPER ENDOSCOPIC ULTRASOUND W/ FNA N/A 4/28/2022    Procedure: EUS WITH BIOPSY;  Surgeon: Roldan Mayer MD;  Location: [facility] ENDOSCOPY;  Service: Gastroenterology;  Laterality: N/A;  HIATEL HERNIA, GASTRIC POLYP     Family History   Problem Relation Age of Onset   • Arthritis Mother    • Gout Mother    • Parkinsonism Mother    • Lung cancer Father    • Arthritis Father    • Lung cancer Maternal Aunt    • Uterine cancer Paternal Aunt 60   • Prostate cancer Paternal Uncle    • Melanoma Paternal Grandmother    • Prostate cancer Paternal Cousin    • Melanoma Paternal Cousin    • Esophageal cancer Paternal Cousin    • Thyroid cancer Maternal Cousin 40   • Esophageal cancer Maternal Cousin    • Ovarian cancer Maternal Cousin 50     Social History     Socioeconomic History   • Marital status:    Tobacco Use   • Smoking status: Never Smoker   •  "Smokeless tobacco: Never Used   Vaping Use   • Vaping Use: Never used   Substance and Sexual Activity   • Alcohol use: Yes     Comment: occasionally   • Drug use: Never   • Sexual activity: Defer         Review of Systems   Musculoskeletal: Positive for arthralgias and back pain.         Vitals:    06/06/22 1521   BP: 145/69   Pulse: 94   Resp: 16   SpO2: 98%   Weight: 99.3 kg (219 lb)   Height: 152.4 cm (60\")   PainSc:   1         Objective   Physical Exam  Musculoskeletal:         General: Tenderness present.        Back:            Imaging Reviewed:  NM bone scan whole body-5/23/2022  No evidence of osseous metastatic disease.    Assessment:    1. Trigger point with neuralgia    2. Mononeuropathy         Plan:   1.  Patient has significant pain and has multiple trigger points present in left rhomboid and around spine of scapula on the left side.  It is hard to pinpoint exact diagnosis of her symptoms but fact that her pain has significantly improved with Lyrica and gabapentin leads me to believe that pain is mostly neuropathic in nature.  It could be secondary to referred pain from cervical facet joints C6-7 or thoracic spondylosis causing irritation of overlaying rhomboid muscle.  Discussed with patient that we may consider trigger point injections to left rhomboid and over the spine of scapula if pain worsens in future.  Her pain is currently very well-tolerated with Lyrica and thus we will hold off on any injections.   2. Continue Lyrica 150 mg daily as prescribed.     RTC as needed.    Aguila Tompkins DO  Pain Management   [facility]         INSPECT REPORT    As part of the patient's treatment plan, I may be prescribing controlled substances. The patient has been made aware of appropriate use of such medications, including potential risk of somnolence, limited ability to drive and/or work safely, and the potential for dependence or overdose. It has also been made clear that these medications are for use by " this patient only, without concomitant use of alcohol or other substances unless prescribed.     Patient has completed prescribing agreement detailing terms of continued prescribing of controlled substances, including monitoring INSPECT reports, urine drug screening, and pill counts if necessary. The patient is aware that inappropriate use will results in cessation of prescribing such medications.    INSPECT report has been reviewed and scanned into the patient's chart.      EMR Dragon/Transcription Disclaimer:   Much of this encounter note is an electronic transcription/translation of spoken language to printed text. The electronic translation of spoken language may permit erroneous, or at times, nonsensical words or phrases to be inadvertently transcribed; Although I have reviewed the note for such errors, some may still exist.

## 2022-06-06 ENCOUNTER — OFFICE VISIT (OUTPATIENT)
Dept: PAIN MEDICINE | Facility: CLINIC | Age: 62
End: 2022-06-06

## 2022-06-06 VITALS
OXYGEN SATURATION: 98 % | HEART RATE: 94 BPM | RESPIRATION RATE: 16 BRPM | WEIGHT: 219 LBS | SYSTOLIC BLOOD PRESSURE: 145 MMHG | DIASTOLIC BLOOD PRESSURE: 69 MMHG | BODY MASS INDEX: 43 KG/M2 | HEIGHT: 60 IN

## 2022-06-06 DIAGNOSIS — G58.9 MONONEUROPATHY: ICD-10-CM

## 2022-06-06 DIAGNOSIS — M79.2 TRIGGER POINT WITH NEURALGIA: Primary | ICD-10-CM

## 2022-06-06 PROCEDURE — 99204 OFFICE O/P NEW MOD 45 MIN: CPT | Performed by: STUDENT IN AN ORGANIZED HEALTH CARE EDUCATION/TRAINING PROGRAM

## 2022-06-17 RX ORDER — MELOXICAM 15 MG/1
TABLET ORAL
Qty: 90 TABLET | Refills: 0 | Status: SHIPPED | OUTPATIENT
Start: 2022-06-17 | End: 2022-09-27

## 2022-06-28 DIAGNOSIS — M25.559 HIP PAIN: Primary | ICD-10-CM

## 2022-07-01 DIAGNOSIS — G58.9 MONONEUROPATHY: ICD-10-CM

## 2022-07-01 DIAGNOSIS — M54.50 CHRONIC BILATERAL LOW BACK PAIN WITHOUT SCIATICA: ICD-10-CM

## 2022-07-01 DIAGNOSIS — G89.29 CHRONIC BILATERAL LOW BACK PAIN WITHOUT SCIATICA: ICD-10-CM

## 2022-07-01 DIAGNOSIS — M54.12 CERVICAL RADICULOPATHY: Primary | ICD-10-CM

## 2022-07-01 RX ORDER — OXYCODONE HYDROCHLORIDE AND ACETAMINOPHEN 5; 325 MG/1; MG/1
1 TABLET ORAL
Qty: 30 TABLET | Refills: 0 | Status: SHIPPED | OUTPATIENT
Start: 2022-07-01 | End: 2022-07-18

## 2022-07-01 NOTE — TELEPHONE ENCOUNTER
Caller: Sindy Martin    Relationship: Self    Best call back number: [phone]    Requested Prescriptions:    oxyCODONE-acetaminophen (Percocet) 5-325 MG per tablet  Take 1 tablet by mouth Every Night    Pharmacy where request should be sent: [id number] [address] AT [facility] [phone] [facility] [phone]      Additional details provided by patient: PATIENT IS OUT   Does the patient have less than a 3 day supply:  [x] Yes  [] No    Griselda Jaquez   07/01/22 13:33 EDT

## 2022-07-18 ENCOUNTER — OFFICE VISIT (OUTPATIENT)
Dept: ORTHOPEDIC SURGERY | Facility: CLINIC | Age: 62
End: 2022-07-18

## 2022-07-18 VITALS
HEART RATE: 97 BPM | HEIGHT: 60 IN | BODY MASS INDEX: 42.13 KG/M2 | SYSTOLIC BLOOD PRESSURE: 143 MMHG | DIASTOLIC BLOOD PRESSURE: 82 MMHG | WEIGHT: 214.6 LBS

## 2022-07-18 DIAGNOSIS — E66.01 MORBID OBESITY: ICD-10-CM

## 2022-07-18 DIAGNOSIS — M46.1 SACROILIITIS: Primary | ICD-10-CM

## 2022-07-18 DIAGNOSIS — M25.551 RIGHT HIP PAIN: ICD-10-CM

## 2022-07-18 PROCEDURE — 99213 OFFICE O/P EST LOW 20 MIN: CPT | Performed by: PHYSICIAN ASSISTANT

## 2022-07-18 RX ORDER — DIPHENHYDRAMINE HYDROCHLORIDE 25 MG/1
TABLET ORAL
COMMUNITY

## 2022-07-18 RX ORDER — HYDROCODONE BITARTRATE AND ACETAMINOPHEN 5; 325 MG/1; MG/1
1 TABLET ORAL NIGHTLY PRN
COMMUNITY
End: 2022-08-16

## 2022-07-18 RX ORDER — CALCIUM POLYCARBOPHIL 625 MG
1250 TABLET ORAL DAILY
COMMUNITY

## 2022-07-18 NOTE — PROGRESS NOTES
ORTHOPEDIC VISIT    Referring Provider: Viraj  Primary Care Provider: Rbuens Cali MD         Subjective:  Chief Complaint:  Chief Complaint   Patient presents with   • Right Hip - Initial Evaluation     Pain since March 2022, nki       HPI:  Sindy Martin is a 61 y.o. female who presents for her initial visit for right hip pain ongoing since March.  She denies any specific injury.  She describes a dull, achy pain, mainly in the low back area.  She does report some radiation of the pain into the buttocks area, but denies any numbness or tingling.  Her pain does increase at night.  She is currently on meloxicam, which does not seem to be helping with her discomfort.  She does report that heat helps.  She denies any previous history of surgery or injections in the hip.  Referred for consultation by Dr. Cali.    Past Medical History:   Diagnosis Date   • Arthritis    • Breast cancer (HCC)    • Hearing loss    • Hypertension    • Hypertension    • Low back pain    • Migraine    • PONV (postoperative nausea and vomiting)    • Pre-diabetes    • Thyroid disease        Past Surgical History:   Procedure Laterality Date   • APPENDECTOMY     • BREAST BIOPSY     • BREAST LUMPECTOMY     • CARPAL TUNNEL RELEASE     • CHOLECYSTECTOMY     • HYSTERECTOMY     • OOPHORECTOMY     • UPPER ENDOSCOPIC ULTRASOUND W/ FNA N/A 4/28/2022    Procedure: EUS WITH BIOPSY;  Surgeon: Roldan Mayer MD;  Location: [facility] ENDOSCOPY;  Service: Gastroenterology;  Laterality: N/A;  HIATEL HERNIA, GASTRIC POLYP       Family History   Problem Relation Age of Onset   • Arthritis Mother    • Gout Mother    • Parkinsonism Mother    • Lung cancer Father    • Arthritis Father    • Lung cancer Maternal Aunt    • Uterine cancer Paternal Aunt 60   • Prostate cancer Paternal Uncle    • Melanoma Paternal Grandmother    • Prostate cancer Paternal Cousin    • Melanoma Paternal Cousin    • Esophageal cancer Paternal Cousin    • Thyroid cancer  Maternal Cousin 40   • Esophageal cancer Maternal Cousin    • Ovarian cancer Maternal Cousin 50       Social History     Occupational History   • Not on file   Tobacco Use   • Smoking status: Never Smoker   • Smokeless tobacco: Never Used   Vaping Use   • Vaping Use: Never used   Substance and Sexual Activity   • Alcohol use: Yes     Comment: occasionally   • Drug use: Never   • Sexual activity: Defer        Medications:    Current Outpatient Medications:   •  ALOE VERA JUICE PO, Take 2 oz by mouth 2 (Two) Times a Day., Disp: , Rfl:   •  aspirin 81 MG tablet, Take 81 mg by mouth Daily., Disp: , Rfl:   •  atorvastatin (LIPITOR) 20 MG tablet, TAKE 1 TABLET DAILY, Disp: 90 tablet, Rfl: 1  •  Biotin (Biotin 5000) 5 MG capsule, Take  by mouth., Disp: , Rfl:   •  buPROPion XL (WELLBUTRIN XL) 150 MG 24 hr tablet, Take 150 mg by mouth Every Morning., Disp: , Rfl:   •  Calcium-Magnesium-Vitamin D - MG-MG-UNIT tablet sustained-release 24 hour, Take 1 capsule by mouth Daily., Disp: , Rfl:   •  escitalopram (LEXAPRO) 20 MG tablet, Take 20 mg by mouth Daily., Disp: , Rfl:   •  exemestane (AROMASIN) 25 MG chemo tablet, TAKE 1 TABLET DAILY (Patient taking differently: Take 25 mg by mouth Daily.), Disp: 90 tablet, Rfl: 3  •  HYDROcodone-acetaminophen (NORCO) 5-325 MG per tablet, Take 1 tablet by mouth At Night As Needed., Disp: , Rfl:   •  loratadine (CLARITIN) 10 MG tablet, Take 10 mg by mouth Daily., Disp: , Rfl:   •  Magnesium 250 MG tablet, Take 500 mg by mouth Daily., Disp: , Rfl:   •  Melatonin 3 MG capsule, Take  by mouth., Disp: , Rfl:   •  meloxicam (MOBIC) 15 MG tablet, TAKE 1 TABLET BY MOUTH DAILY, Disp: 90 tablet, Rfl: 0  •  metFORMIN (GLUCOPHAGE) 500 MG tablet, TAKE 2 TABLETS TWICE A DAY, Disp: 360 tablet, Rfl: 3  •  multivitamin with minerals tablet tablet, Take 1 tablet by mouth Daily., Disp: , Rfl:   •  NON FORMULARY, OSTEOBIFLEX 1 TAB DAILY, Disp: , Rfl:   •  pantoprazole (PROTONIX) 40 MG EC tablet,  "Take 40 mg by mouth Daily., Disp: , Rfl:   •  polycarbophil (calcium polycarbophil) 625 MG tablet tablet, Take 1,250 mg by mouth Daily., Disp: , Rfl:   •  Probiotic Product (PROBIOTIC BLEND PO), Take 1 tablet by mouth Daily., Disp: , Rfl:   •  Synthroid 50 MCG tablet, TAKE 1 TABLET DAILY (Patient taking differently: Take 50 mcg by mouth Every Morning.), Disp: 90 tablet, Rfl: 1    Allergies:  Allergies   Allergen Reactions   • Codeine Nausea And Vomiting   • Ibuprofen Anaphylaxis         Review of Systems:  Gen -no fever, chills , sweats, headache   Eyes - no irritation or discharge   ENT -  no ear pain , runny nose , sore throat , difficulty swallowing   Resp - no cough , congestion , excessive expectoration   CVS - no chest pain , palpitations.   Abd - no pain , nausea , vomiting , diarrhea   Skin - no rash , lesions.   Neuro - no dizziness    Please see HPI for any other pertinent positives.  All other systems were reviewed and are negative.       Objective   Objective:    /82 (BP Location: Left arm, Patient Position: Sitting, Cuff Size: Large Adult)   Pulse 97   Ht 152.4 cm (60\")   Wt 97.3 kg (214 lb 9.6 oz)   LMP  (LMP Unknown) Comment: Had hystorectomy March 2015  BMI 41.91 kg/m²     Physical Examination:  Alert, oriented, morbidly obese individual in no acute distress, ambulating unassisted  Right lower extremity shows no appreciable swelling in the thigh area. It is grossly well aligned, and the patient is neurovascularly intact distally. Plantar and dorsiflexion is 5/5. There is no tenderness to palpation in the hip area or with range of motion, which is smooth and WNL.  MISTY positive for SI joint pathology.  There is mild tenderness to palpation over the SI joint.         Imaging:  xrays obtained today  right Hip X-Ray    Date of exam: 7/18/2022    Indication: Right hip pain    AP and lateral    Findings:Shows minimal to mild DJD., No fractures or dislocations appreciated. and degenerative " "changes present in the lumbar spine.  No significant changes when compared to previous imaging.    within normal limits joint spaces    Hardware appropriately positioned not applicable    yes prior studies available for comparison.    X-RAY was ordered and reviewed by JOSSE Gallo            Assessment:  1. Sacroiliitis (HCC)    2. Right hip pain    3. Morbid obesity (HCC)                 Plan:  Options were discussed with the patient today.  Weight loss is highly recommended.  She would like to proceed with a fluoroscopy guided right SI joint injection.  She will be referred to IR for this.  She should continue her meloxicam.  I have given her some home exercises to begin.  She may follow-up as needed.  Natural history and expected course discussed. Questions answered.  Educational materials distributed.  NSAIDs per medication orders.  OTC analgesics as needed.  cortisone injections  physical therapy  weight loss  activtiy modification               JOSSE Gallo  07/18/22  14:49 EDT    \"Please note that portions of this note were completed with a voice recognition program\".   "

## 2022-07-22 ENCOUNTER — HOSPITAL ENCOUNTER (OUTPATIENT)
Dept: CT IMAGING | Facility: HOSPITAL | Age: 62
Discharge: HOME | End: 2022-07-22
Admitting: PHYSICIAN ASSISTANT

## 2022-07-22 DIAGNOSIS — M46.1 SACROILIITIS: ICD-10-CM

## 2022-07-22 PROCEDURE — 25010000002 METHYLPREDNISOLONE PER 80 MG: Performed by: PHYSICIAN ASSISTANT

## 2022-07-22 PROCEDURE — 0 LIDOCAINE 1 % SOLUTION: Performed by: PHYSICIAN ASSISTANT

## 2022-07-22 RX ORDER — METHYLPREDNISOLONE ACETATE 80 MG/ML
80 INJECTION, SUSPENSION INTRA-ARTICULAR; INTRALESIONAL; INTRAMUSCULAR; SOFT TISSUE ONCE
Status: COMPLETED | OUTPATIENT
Start: 2022-07-22 | End: 2022-07-22

## 2022-07-22 RX ORDER — LIDOCAINE HYDROCHLORIDE 10 MG/ML
30 INJECTION, SOLUTION INFILTRATION; PERINEURAL ONCE
Status: COMPLETED | OUTPATIENT
Start: 2022-07-22 | End: 2022-07-22

## 2022-07-22 RX ORDER — BUPIVACAINE HYDROCHLORIDE 2.5 MG/ML
10 INJECTION, SOLUTION EPIDURAL; INFILTRATION; INTRACAUDAL ONCE
Status: COMPLETED | OUTPATIENT
Start: 2022-07-22 | End: 2022-07-22

## 2022-07-22 RX ADMIN — METHYLPREDNISOLONE ACETATE 80 MG: 80 INJECTION, SUSPENSION INTRA-ARTICULAR; INTRALESIONAL; INTRAMUSCULAR; SOFT TISSUE at 08:22

## 2022-07-22 RX ADMIN — BUPIVACAINE HYDROCHLORIDE 10 ML: 2.5 INJECTION, SOLUTION EPIDURAL; INFILTRATION; INTRACAUDAL; PERINEURAL at 08:21

## 2022-07-22 RX ADMIN — LIDOCAINE HYDROCHLORIDE 30 ML: 10 INJECTION, SOLUTION INFILTRATION; PERINEURAL at 08:22

## 2022-08-12 NOTE — PROGRESS NOTES
Hematology/Oncology Outpatient Follow Up    PATIENT NAME:Sindy Martin  :1960  MRN: [id number]  PRIMARY CARE PHYSICIAN: Rubens Cali MD  REFERRING PHYSICIAN: Rubens Cali MD    Chief Complaint   Patient presents with   • Follow-up     Malignant neoplasm of right female breast, unspecified estrogen receptor status, unspecified site of breast (HCC)        HISTORY OF PRESENT ILLNESS:     This is a 60-year-old female who was originally seen on 14 after she presented with early stage right breast cancer.  Please refer to the details of my notes for more information.   • She had an abnormal mammogram in May 2014.    • 6/10/14 - She underwent ultrasound guided localization of the right breast mass with sentinel lymph node injection, right lumpectomy.  Pathology revealed one sentinel lymph node negative for malignancy.  Tumor was strongly positive for ER, SD and HER-2/hiro was negative.  Tumor measured 1 cm in greatest dimension.  It was a grade III disease.  All the margins were negative.  Pathologic stage is [id number].    • 14 - Patient’s tumor was sent for Oncotype DX assay.  This returned with a recurrent score and validation study, she has a 12% chance of distant recurrence despite five years of Tamoxifen over the next 10 years.  On the validation study her ER was positive, SD was positive and HER-2/hiro was negative.  • 14 - Patient had BRCA 1 and 2 mutational analysis which returned with BRCA 2 deleterious mutation involving [id number] on the BRCA 2 sequencing gene.    • 2014 - She had FSH and serum estradiol levels, which are noted to be in the postmenopausal range.    • 14 - Patient was initiated on adjuvant chemotherapy with Taxotere and Cytoxan along with Neulasta.     • 10/6/14 - Patient completed 4th cycle of chemotherapy with Cytoxan and Taxotere.   • Patient was seen by Dr. Buck who performed excisional biopsy of a mole on the left forearm and this was benign from  history.     • 11/17/14 - Patient completed whole right breast radiation under the direction of Dr. Mcgrath.  She received total dose of 4256 cGy.  • Patient has been seen by Dr. Son who will perform her complete hysterectomy due to BRCA positivity after the first of the year.      • 12/23/14 - Patient had a bone density, which was essentially normal.    • December 2014 - Patient was initiated on Arimidex 1 mg every day as well as Os-Quinton D.   • April 2015 - Patient underwent total robotic hysterectomy with bilateral adnexectomy.  Pathology was benign.  I have requested for official report of the above procedure and path reports.  Patient has been seen by Dr. Son on 4/29/15.    • 5/19/15 - Port catheter was removed.   • 5/28/15 - Bilateral diagnostic mammogram which was essentially unremarkable except that patient has heterogenously dense breasts.  • 6/22/15 - Patient was seen by Dr. Guo.  Follow up in one year was recommended.    • Fasting lipid profile done which showed total cholesterol of 234, HDL of 42, triglyceride 237, elevated, and LDH was 145, also elevated.  BUN 20, creatinine 0.87 and LFTs were essentially unremarkable.  WBC 6.7, hemoglobin 12.4 and platelet count 197,000.    • 12/5/15 - Fasting lipid profile showed total cholesterol 133, HDL 50 and LDL 67, triglyceride 81.  BUN 22, creatinine 0.7.  • 2/24/16 - Bilateral breast MRI which showed post-op scar change on the right breast, but no MR finding to suggest malignancy.  She has a 4 mm enhancing lesion in the lower right breast at the inframammary fold.  Patient’s breast exam and skin evaluation was essentially unremarkable at the site where the 4 mm enhancing lesion was noted.  Patient also had no specific complaints.  • 3/22/16 - Anemia work up with reticulocyte count (N), B12 (N) 521, ferritin (N) 43, folate 20, haptoglobin 184, , BUN 11, creatinine 11.8.  SPEP with immunofixation assay showed IgG kappa monoclonal protein with M  spike of 0.2 gm/dL.  Erythropoietin level (N) 22 [range 2-18].    • 3/31/16 - Quantitative immunoglobulins for IgA, IgG and IgM were normal.  Serum free light chains were essentially unremarkable.    • 4/5/16 - 24-hour urine electrophoresis which did not show any evidence of monoclonal gammopathy.   • 6/10/16 - Bilateral mammogram was essentially normal.  Follow up in one year was recommended.    • 6/20/16 - Serum transferrin receptor assay returned with high level at 5.2 [range 1.9-4.9].    • 6/27/16 - Urinalysis showed trace blood.  • 8/1/16 - Patient was seen by Dr. Torres and had EGD and colonoscopy.  EGD showed hiatal hernia, moderate to severe gastroduodenitis, but no ulcers seen.  No abnormalities seen on her colonoscopy.   • 9/12/16 - Chemistry panel:  BUN 14, creatinine 0.8, total protein (L) 5.8, alkaline phosphatase 94 [range 32-91].  Quantitative immunoglobulins for IgA (N) 101, IgG slightly low at 530 [range 600-1500], IgM (N) 195.  SPEP with immunofixation assay was essentially negative for any significant monoclonal protein.   • 9/19/16 - Urinalysis was negative for hematuria.   • 10/12/16 - Bone scan showed degenerative changes in the knees, otherwise negative.  No signs of metastatic disease.    • 10/10 & 10/17/16 - Patient received IV Injectafer.    • 1/16/17 - SPEP with immunofixation assay which did not show any evidence of monoclonal protein.   • 2/9/17 - Bilateral breast MRI showed no evidence of malignancy, but she had a skin lesion noted on the left breast.  Patient has since then seen Dr. Buck.  She has a follow up appointment in six months.    • 6/26/17 - Patient had bilateral diagnostic mammogram with tomosynthesis.  This revealed new linear pleomorphic microcalcifications in the medial right breast.  The left breast was unchanged.  Stereotactic biopsy was recommended.    • 7/12/17 - Patient underwent stereotactic biopsy of the right breast calcifications.  Pathology showed fibrous  mastopathy with coarse stromal microcalcifications suggestive of previous procedure.  Negative for carcinoma in situ and invasive carcinoma.  The result was concurrent with the image findings.  Follow up in one year was recommended.    • 9/11/17 - Since her last visit patient had labs including SPEP with immunofixation assay which showed a free lambda light chain identified.  IgA was 83.  IgG was 460.  IgM was 158.  BUN 17.  Creatinine 0.6.  Alkaline phosphatase 101.  Serum calcium 9.2.  CMP was normal.    • 9/16/17 - DEXA scan was normal.    • 2/15/18 - Bilateral breast MRI with stable post-op changes seen at the right breast, but no mass was seen in either the right or left breast.  Bilateral breast mammogram is due June 2018.   • 3/12/18 - CBC:  WBC 7.9, hemoglobin 13, platelet count 183,000.  Chemistry panel:  BUN 19.  Creatinine 0.7.  Alkaline phosphatase 92.  IgA 511.  IgG low at 518.  IgM normal 191.  Kappa lambda free light chains were normal.  SPEP with LAZARO was normal.    • 7/30/18 - Bilateral diagnostic mammogram with 3D with no mammographic evidence of malignancy.    • 9/18/18 - CT scan of the abdomen and pelvis which showed post-op changes, but no masses were identified in the abdomen.    • 2/4/19 - SPEP with LAZARO which did not show any monoclonal protein.  Total cholesterol was 116, triglycerides 69, LDL 66, HDL 43.  Chemistry:  BUN 17, creatinine 0.9, liver function tests are normal.    · 2/22/2019-patient had bilateral breast MRI, there was no MR evidence of malignancy in either breast.  · 8/29/19-patient had a DEXA scan, this was normal bone mineral density  · 8/29/2019-patient had bilateral mammogram which showed heterogeneously dense breast tissue.  Scar tissue was noted on the right breast consistent with previous surgery.  Follow-up in 1 year was recommended  · 2/19/2020 patient had bilateral breast MRI which was normal, follow-up in 1 year was recommended  · She is here today for routine  follow-up visit.  Patient does not have any breast specific complaints today  · March 2, 2021 patient had bilateral breast MRI which did not show any MR evidence of malignancy  · March 6, 2021 she had MRI of the abdomen which basically revealed mild fatty and atrophic pancreas no solid or cystic pancreatic mass was seen.  Patient to follow-up with Dr. Torres for EUS which would be due March 2022    Past Medical History:   Diagnosis Date   • Arthritis    • Breast cancer (HCC)    • Hearing loss    • Hypertension    • Hypertension    • Low back pain    • Migraine    • PONV (postoperative nausea and vomiting)    • Pre-diabetes    • Thyroid disease        Past Surgical History:   Procedure Laterality Date   • APPENDECTOMY     • BREAST BIOPSY     • BREAST LUMPECTOMY     • CARPAL TUNNEL RELEASE     • CHOLECYSTECTOMY     • HYSTERECTOMY     • OOPHORECTOMY     • UPPER ENDOSCOPIC ULTRASOUND W/ FNA N/A 4/28/2022    Procedure: EUS WITH BIOPSY;  Surgeon: Roldan Mayer MD;  Location: [facility] ENDOSCOPY;  Service: Gastroenterology;  Laterality: N/A;  HIATEL HERNIA, GASTRIC POLYP         Current Outpatient Medications:   •  ALOE VERA JUICE PO, Take 2 oz by mouth 2 (Two) Times a Day., Disp: , Rfl:   •  aspirin 81 MG tablet, Take 81 mg by mouth Daily., Disp: , Rfl:   •  atorvastatin (LIPITOR) 20 MG tablet, TAKE 1 TABLET DAILY, Disp: 90 tablet, Rfl: 1  •  Biotin (Biotin 5000) 5 MG capsule, Take  by mouth., Disp: , Rfl:   •  buPROPion XL (WELLBUTRIN XL) 150 MG 24 hr tablet, Take 150 mg by mouth Every Morning., Disp: , Rfl:   •  escitalopram (LEXAPRO) 20 MG tablet, Take 20 mg by mouth Daily., Disp: , Rfl:   •  exemestane (AROMASIN) 25 MG chemo tablet, TAKE 1 TABLET DAILY (Patient taking differently: Take 25 mg by mouth Daily.), Disp: 90 tablet, Rfl: 3  •  loratadine (CLARITIN) 10 MG tablet, Take 10 mg by mouth Daily., Disp: , Rfl:   •  Magnesium 250 MG tablet, Take 500 mg by mouth Daily., Disp: , Rfl:   •  Melatonin 3 MG capsule, Take  by  mouth., Disp: , Rfl:   •  meloxicam (MOBIC) 15 MG tablet, TAKE 1 TABLET BY MOUTH DAILY, Disp: 90 tablet, Rfl: 0  •  metFORMIN (GLUCOPHAGE) 500 MG tablet, TAKE 2 TABLETS TWICE A DAY, Disp: 360 tablet, Rfl: 3  •  multivitamin with minerals tablet tablet, Take 1 tablet by mouth Daily., Disp: , Rfl:   •  NON FORMULARY, OSTEOBIFLEX 1 TAB DAILY, Disp: , Rfl:   •  pantoprazole (PROTONIX) 40 MG EC tablet, Take 40 mg by mouth Daily., Disp: , Rfl:   •  polycarbophil (calcium polycarbophil) 625 MG tablet tablet, Take 1,250 mg by mouth Daily., Disp: , Rfl:   •  Probiotic Product (PROBIOTIC BLEND PO), Take 1 tablet by mouth Daily., Disp: , Rfl:   •  Synthroid 50 MCG tablet, TAKE 1 TABLET DAILY (Patient taking differently: Take 50 mcg by mouth Every Morning.), Disp: 90 tablet, Rfl: 1    Allergies   Allergen Reactions   • Codeine Nausea And Vomiting   • Ibuprofen Anaphylaxis       Family History   Problem Relation Age of Onset   • Arthritis Mother    • Gout Mother    • Parkinsonism Mother    • Lung cancer Father    • Arthritis Father    • Lung cancer Maternal Aunt    • Uterine cancer Paternal Aunt 60   • Prostate cancer Paternal Uncle    • Melanoma Paternal Grandmother    • Prostate cancer Paternal Cousin    • Melanoma Paternal Cousin    • Esophageal cancer Paternal Cousin    • Thyroid cancer Maternal Cousin 40   • Esophageal cancer Maternal Cousin    • Ovarian cancer Maternal Cousin 50       Cancer-related family history includes Esophageal cancer in her maternal cousin and paternal cousin; Lung cancer in her father and maternal aunt; Melanoma in her paternal cousin and paternal grandmother; Ovarian cancer (age of onset: 50) in her maternal cousin; Prostate cancer in her paternal cousin and paternal uncle; Thyroid cancer (age of onset: 40) in her maternal cousin; Uterine cancer (age of onset: 60) in her paternal aunt.    Social History     Tobacco Use   • Smoking status: Never Smoker   • Smokeless tobacco: Never Used   Vaping Use  "  • Vaping Use: Never used   Substance Use Topics   • Alcohol use: Yes     Comment: occasionally   • Drug use: Never       I have reviewed and confirmed the accuracy of the patient's history: Chief complaint, HPI and ROS as entered by the MA/LPN/RN. Munira Finney MD 08/16/22     SUBJECTIVE:    Patient denies any new issues          REVIEW OF SYSTEMS:    Review of Systems   Constitutional: Negative for chills and fever.   HENT: Negative for ear pain, mouth sores, nosebleeds and sore throat.    Eyes: Negative for photophobia and visual disturbance.   Respiratory: Negative for wheezing and stridor.    Cardiovascular: Negative for chest pain and palpitations.   Gastrointestinal: Negative for abdominal pain, diarrhea, nausea and vomiting.   Endocrine: Negative for cold intolerance and heat intolerance.   Genitourinary: Negative for dysuria and hematuria.   Musculoskeletal: Negative for joint swelling and neck stiffness.   Skin: Negative for color change and rash.   Neurological: Negative for seizures and syncope.   Hematological: Negative for adenopathy.        No obvious bleeding   Psychiatric/Behavioral: Negative for agitation, confusion and hallucinations.     ROS as documented above    OBJECTIVE:    Vitals:    08/16/22 1127   BP: 132/83   Pulse: 96   Resp: 18   Temp: 96.4 °F (35.8 °C)   TempSrc: Infrared   Weight: 97.5 kg (215 lb)   Height: 152.4 cm (60\")   PainSc: 0-No pain       ECOG    (1) Restricted in physically strenuous activity, ambulatory and able to do work of light nature    Physical Exam   Constitutional: She is oriented to person, place, and time. No distress.   HENT:   Head: Normocephalic and atraumatic.   Eyes: Conjunctivae are normal. Right eye exhibits no discharge. Left eye exhibits no discharge. No scleral icterus.   Neck: No thyromegaly present.   Cardiovascular: Normal rate, regular rhythm and normal heart sounds. Exam reveals no gallop and no friction rub.   Pulmonary/Chest: Effort " normal. No stridor. No respiratory distress. She has no wheezes. Right breast exhibits no inverted nipple, no mass, no nipple discharge, no skin change and no tenderness. Left breast exhibits no inverted nipple, no mass, no nipple discharge, no skin change and no tenderness. No breast bleeding. Breasts are symmetrical.   Bilateral breast exam is negative  Bilateral axillary exam is negative   Abdominal: Soft. Bowel sounds are normal. She exhibits no mass. There is no abdominal tenderness. There is no rebound and no guarding.   Musculoskeletal: Normal range of motion. No tenderness.   Lymphadenopathy:     She has no cervical adenopathy.   Neurological: She is alert and oriented to person, place, and time. She exhibits normal muscle tone.   Skin: Skin is warm. No rash noted. She is not diaphoretic. No erythema.   Psychiatric: Her behavior is normal.   Nursing note and vitals reviewed.    Physical exam as documented above    I have reexamined the patient and the results are consistent with the previously documented exam. Munira Glendy Finney MD     RECENT LABS    WBC   Date Value Ref Range Status   08/16/2022 9.70 3.40 - 10.80 10*3/mm3 Final     RBC   Date Value Ref Range Status   08/16/2022 4.67 3.77 - 5.28 10*6/mm3 Final     Hemoglobin   Date Value Ref Range Status   08/16/2022 12.4 12.0 - 15.9 g/dL Final     Hematocrit   Date Value Ref Range Status   08/16/2022 38.8 34.0 - 46.6 % Final     MCV   Date Value Ref Range Status   08/16/2022 83.1 79.0 - 97.0 fL Final     MCH   Date Value Ref Range Status   08/16/2022 26.6 26.6 - 33.0 pg Final     MCHC   Date Value Ref Range Status   08/16/2022 32.0 31.5 - 35.7 g/dL Final     RDW   Date Value Ref Range Status   08/16/2022 16.8 (H) 12.3 - 15.4 % Final     RDW-SD   Date Value Ref Range Status   08/16/2022 49.3 37.0 - 54.0 fl Final     MPV   Date Value Ref Range Status   08/16/2022 10.8 6.0 - 12.0 fL Final     Platelets   Date Value Ref Range Status   08/16/2022 210 140 -  450 10*3/mm3 Final     Neutrophil %   Date Value Ref Range Status   08/16/2022 55.0 42.7 - 76.0 % Final     Lymphocyte %   Date Value Ref Range Status   08/16/2022 35.6 19.6 - 45.3 % Final     Monocyte %   Date Value Ref Range Status   08/16/2022 7.9 5.0 - 12.0 % Final     Eosinophil %   Date Value Ref Range Status   08/16/2022 1.3 0.3 - 6.2 % Final     Basophil %   Date Value Ref Range Status   08/16/2022 0.2 0.0 - 1.5 % Final     Immature Grans %   Date Value Ref Range Status   03/10/2021 0.2 0.0 - 0.5 % Final     Neutrophils, Absolute   Date Value Ref Range Status   08/16/2022 5.33 1.70 - 7.00 10*3/mm3 Final     Lymphocytes, Absolute   Date Value Ref Range Status   08/16/2022 3.45 (H) 0.70 - 3.10 10*3/mm3 Final     Monocytes, Absolute   Date Value Ref Range Status   08/16/2022 0.77 0.10 - 0.90 10*3/mm3 Final     Eosinophils, Absolute   Date Value Ref Range Status   08/16/2022 0.13 0.00 - 0.40 10*3/mm3 Final     Basophils, Absolute   Date Value Ref Range Status   08/16/2022 0.02 0.00 - 0.20 10*3/mm3 Final     Immature Grans, Absolute   Date Value Ref Range Status   03/10/2021 0.02 0.00 - 0.05 10*3/mm3 Final     nRBC   Date Value Ref Range Status   03/10/2021 0.0 0.0 - 0.2 /100 WBC Final       Lab Results   Component Value Date    GLUCOSE 113 (H) 02/14/2022    BUN 19 02/14/2022    CREATININE 0.77 02/14/2022    EGFRIFNONA 76 02/14/2022    EGFRIFAFRI 85 11/10/2016    BCR 24.7 02/14/2022    K 4.1 02/14/2022    CO2 27.0 02/14/2022    CALCIUM 9.4 02/14/2022    PROTENTOTREF 6.3 08/16/2021    ALBUMIN 4.20 02/14/2022    LABIL2 1.3 08/16/2021    AST 23 02/14/2022    ALT 27 02/14/2022           ASSESSMENT:    1. T1b N0 M0 invasive ductal carcinoma, ER positive, CA positive, HER-2/hiro negative, grade III tumor.  Ongoing surveillance  2. Status post right lumpectomy with sentinel lymph node biopsy.  Reviewed  3. Status post adjuvant chemotherapy with Cytoxan and Taxotere for four cycles.    4. BRCA 2 sequencing gene with 6366  deletion 4 deleterious mutation.  Surveillance care  5. Status post right breast radiation.   6. Postmenopausal hormonal status.   7. Status post robotic complete hysterectomy and adnexectomy with benign findings.    8. Hypercholesterolemia on treatment.  Lipid panel 2/4/19 was normal.   9. Arimidex initiated December 2014, discontinued October 2016 due to side effects.  Aromasin initiated November 2016.  Remains on Aromasin.  We will continue the same  10. Iron deficiency anemia, resolved.   11. Monoclonal gammopathy of unknown significance.  Has been zoraida;l for past 2 years. Will discontinue checking.  12. Bilateral hip pain.  Resolved        PLANS:     She will continue Aromasin, Os-Quinton D till November 2024    MRI will be due March 2023    Bilateral screening mammogram will be due September 2022:  This will be ordered today    Patient will  be due for bone density in September 2023.      Follow-up with dermatologist once a year: Reviewed    Schedule follow up Dr Buck    Follow up with Dr. Mayer for EUS for pancreas due March 2024.  MRI of the pancreas will be due in March 2023    Monoclonal labs have been normal past 2 years therefore will discontinue checking at this time.  She will continue monthly breast self exam and call for lumps, nipple discharge, skin discoloration. A CMP will be done today.        She will follow up with Dr. Mayer for pancreatic cancer screening.  We discussed use of pancreatic MRI alternating with EUS.          Patient is five years out from her breast cancer diagnosis.  I did discuss prophylactic mastectomies.  At this time patient wants to continue with breast conservation.  I will continue Aromasin for her for breast cancer prevention.  She has not had any significant toxicities on this treatment.             I have reviewed labs results, imaging, vitals, and medications with the patient today. Follow up in 6 months with me or earlier as needed for problems         Patient  verbalized understanding and is in agreement of the above plan.        I spent 30 total minutes, face-to-face, caring for Sindy today.  90% of this time involved counseling and/or coordination of care as documented within this note.

## 2022-08-16 ENCOUNTER — OFFICE VISIT (OUTPATIENT)
Dept: ONCOLOGY | Facility: CLINIC | Age: 62
End: 2022-08-16

## 2022-08-16 ENCOUNTER — LAB (OUTPATIENT)
Dept: LAB | Facility: HOSPITAL | Age: 62
End: 2022-08-16

## 2022-08-16 VITALS
HEART RATE: 96 BPM | RESPIRATION RATE: 18 BRPM | HEIGHT: 60 IN | DIASTOLIC BLOOD PRESSURE: 83 MMHG | BODY MASS INDEX: 42.21 KG/M2 | WEIGHT: 215 LBS | TEMPERATURE: 96.4 F | SYSTOLIC BLOOD PRESSURE: 132 MMHG

## 2022-08-16 DIAGNOSIS — C50.911 MALIGNANT NEOPLASM OF RIGHT FEMALE BREAST, UNSPECIFIED ESTROGEN RECEPTOR STATUS, UNSPECIFIED SITE OF BREAST: Primary | ICD-10-CM

## 2022-08-16 LAB
ALBUMIN SERPL-MCNC: 4.1 G/DL (ref 3.5–5.2)
ALBUMIN/GLOB SERPL: 1.6 G/DL
ALP SERPL-CCNC: 114 U/L (ref 39–117)
ALT SERPL W P-5'-P-CCNC: 17 U/L (ref 1–33)
ANION GAP SERPL CALCULATED.3IONS-SCNC: 12 MMOL/L (ref 5–15)
AST SERPL-CCNC: 18 U/L (ref 1–32)
BASOPHILS # BLD AUTO: 0.02 10*3/MM3 (ref 0–0.2)
BASOPHILS NFR BLD AUTO: 0.2 % (ref 0–1.5)
BILIRUB SERPL-MCNC: 0.2 MG/DL (ref 0–1.2)
BUN SERPL-MCNC: 23 MG/DL (ref 8–23)
BUN/CREAT SERPL: 22.5 (ref 7–25)
CALCIUM SPEC-SCNC: 9.7 MG/DL (ref 8.6–10.5)
CHLORIDE SERPL-SCNC: 102 MMOL/L (ref 98–107)
CO2 SERPL-SCNC: 28 MMOL/L (ref 22–29)
CREAT SERPL-MCNC: 1.02 MG/DL (ref 0.57–1)
DEPRECATED RDW RBC AUTO: 49.3 FL (ref 37–54)
EGFRCR SERPLBLD CKD-EPI 2021: 61.6 ML/MIN/1.73
EOSINOPHIL # BLD AUTO: 0.13 10*3/MM3 (ref 0–0.4)
EOSINOPHIL NFR BLD AUTO: 1.3 % (ref 0.3–6.2)
ERYTHROCYTE [DISTWIDTH] IN BLOOD BY AUTOMATED COUNT: 16.8 % (ref 12.3–15.4)
GLOBULIN UR ELPH-MCNC: 2.5 GM/DL
GLUCOSE SERPL-MCNC: 102 MG/DL (ref 65–99)
HCT VFR BLD AUTO: 38.8 % (ref 34–46.6)
HGB BLD-MCNC: 12.4 G/DL (ref 12–15.9)
HOLD SPECIMEN: NORMAL
LYMPHOCYTES # BLD AUTO: 3.45 10*3/MM3 (ref 0.7–3.1)
LYMPHOCYTES NFR BLD AUTO: 35.6 % (ref 19.6–45.3)
MCH RBC QN AUTO: 26.6 PG (ref 26.6–33)
MCHC RBC AUTO-ENTMCNC: 32 G/DL (ref 31.5–35.7)
MCV RBC AUTO: 83.1 FL (ref 79–97)
MONOCYTES # BLD AUTO: 0.77 10*3/MM3 (ref 0.1–0.9)
MONOCYTES NFR BLD AUTO: 7.9 % (ref 5–12)
NEUTROPHILS NFR BLD AUTO: 5.33 10*3/MM3 (ref 1.7–7)
NEUTROPHILS NFR BLD AUTO: 55 % (ref 42.7–76)
PLATELET # BLD AUTO: 210 10*3/MM3 (ref 140–450)
PMV BLD AUTO: 10.8 FL (ref 6–12)
POTASSIUM SERPL-SCNC: 4.1 MMOL/L (ref 3.5–5.2)
PROT SERPL-MCNC: 6.6 G/DL (ref 6–8.5)
RBC # BLD AUTO: 4.67 10*6/MM3 (ref 3.77–5.28)
SODIUM SERPL-SCNC: 142 MMOL/L (ref 136–145)
WBC NRBC COR # BLD: 9.7 10*3/MM3 (ref 3.4–10.8)

## 2022-08-16 PROCEDURE — 85025 COMPLETE CBC W/AUTO DIFF WBC: CPT

## 2022-08-16 PROCEDURE — 36415 COLL VENOUS BLD VENIPUNCTURE: CPT

## 2022-08-16 PROCEDURE — 80053 COMPREHEN METABOLIC PANEL: CPT

## 2022-08-16 PROCEDURE — 99214 OFFICE O/P EST MOD 30 MIN: CPT | Performed by: INTERNAL MEDICINE

## 2022-08-29 DIAGNOSIS — C50.911 MALIGNANT NEOPLASM OF RIGHT FEMALE BREAST, UNSPECIFIED ESTROGEN RECEPTOR STATUS, UNSPECIFIED SITE OF BREAST: ICD-10-CM

## 2022-08-30 RX ORDER — EXEMESTANE 25 MG/1
TABLET ORAL
Qty: 90 TABLET | Refills: 3 | Status: SHIPPED | OUTPATIENT
Start: 2022-08-30

## 2022-09-27 RX ORDER — MELOXICAM 15 MG/1
TABLET ORAL
Qty: 90 TABLET | Refills: 0 | Status: SHIPPED | OUTPATIENT
Start: 2022-09-27 | End: 2022-12-30

## 2022-10-03 ENCOUNTER — HOSPITAL ENCOUNTER (OUTPATIENT)
Dept: MAMMOGRAPHY | Facility: HOSPITAL | Age: 62
Discharge: HOME | End: 2022-10-03
Admitting: INTERNAL MEDICINE

## 2022-10-03 DIAGNOSIS — C50.911 MALIGNANT NEOPLASM OF RIGHT FEMALE BREAST, UNSPECIFIED ESTROGEN RECEPTOR STATUS, UNSPECIFIED SITE OF BREAST: ICD-10-CM

## 2022-10-03 PROCEDURE — 77067 SCR MAMMO BI INCL CAD: CPT

## 2022-10-03 PROCEDURE — 77063 BREAST TOMOSYNTHESIS BI: CPT

## 2022-10-17 RX ORDER — LEVOTHYROXINE SODIUM 50 MCG
TABLET ORAL
Qty: 90 TABLET | Refills: 1 | Status: SHIPPED | OUTPATIENT
Start: 2022-10-17

## 2022-10-26 ENCOUNTER — TELEPHONE (OUTPATIENT)
Dept: FAMILY MEDICINE CLINIC | Facility: CLINIC | Age: 62
End: 2022-10-26

## 2022-10-26 NOTE — TELEPHONE ENCOUNTER
Received the following iCrossingt message:      With Provider: Rubens Cali MD [[facility]]     Preferred Date Range: 10/28/2022 - 11/4/2022     Preferred Times: Any Time     Reason for visit: Problem Follow-Up Visit     Comments:  Pain in L. shoulder, bursitis or tendonitis, running into collar bone and down L. bicep.  Pain spot on the back of L. shoulder joint.        Светлана - when can PMJ see her?

## 2022-11-10 ENCOUNTER — OFFICE VISIT (OUTPATIENT)
Dept: FAMILY MEDICINE CLINIC | Facility: CLINIC | Age: 62
End: 2022-11-10

## 2022-11-10 VITALS — OXYGEN SATURATION: 95 % | DIASTOLIC BLOOD PRESSURE: 80 MMHG | HEART RATE: 111 BPM | SYSTOLIC BLOOD PRESSURE: 130 MMHG

## 2022-11-10 DIAGNOSIS — M25.562 CHRONIC PAIN OF BOTH KNEES: ICD-10-CM

## 2022-11-10 DIAGNOSIS — M25.512 ACUTE PAIN OF LEFT SHOULDER: Primary | ICD-10-CM

## 2022-11-10 DIAGNOSIS — M25.561 CHRONIC PAIN OF BOTH KNEES: ICD-10-CM

## 2022-11-10 DIAGNOSIS — G89.29 CHRONIC PAIN OF BOTH KNEES: ICD-10-CM

## 2022-11-10 PROCEDURE — 20610 DRAIN/INJ JOINT/BURSA W/O US: CPT | Performed by: FAMILY MEDICINE

## 2022-11-10 PROCEDURE — 99213 OFFICE O/P EST LOW 20 MIN: CPT | Performed by: FAMILY MEDICINE

## 2022-11-10 RX ADMIN — TRIAMCINOLONE ACETONIDE 80 MG: 40 INJECTION, SUSPENSION INTRA-ARTICULAR; INTRAMUSCULAR at 14:57

## 2022-11-10 NOTE — PROGRESS NOTES
"Chief Complaint  Shoulder Pain (left)    Subjective      Sindy Martin presents to [facility]  History of Present Illness  For left shoulder pain.  The patient presents today for bilateral knee pain.    The patient complains of bilateral knee pain that keeps her awake at night. She states that her knees are stiff when she gets up after sitting for a prolonged period of time. She notes that she has been applying Aspercreme to her knees. The patient localizes her pain to the lateral aspect of her knees and into her calves bilaterally. She states that her knees are better in the morning. She notes that she occasionally uses heat patches, which are helpful. The patient is currently taking meloxicam 15 mg in the morning. She states that her knees have been painful for the past 6 weeks to 2 months. She notes that she was seen by an orthopedist and pain management in the past. She underwent x-rays of her knees, which showed mild arthritis. The patient states that she received an SI injection in the hospital in 07/2022, which was helpful. She notes that she has attended physical therapy in the past for her arms and shoulders.    The patient complains of left shoulder pain. She localizes her pain to the posterior aspect of her left shoulder. She states that her pain occasionally radiates into her jaw and down her biceps. She denies any injury or trauma to her shoulder. The patient states that she has taken over-the-counter pain relievers in the past.    The patient states that she has received her COVID-19 vaccine on 10/31/2022 and her influenza vaccine on 11/08/2022.      Objective   Vital Signs:  /80 (BP Location: Right arm)   Pulse 111   SpO2 95%   Estimated body mass index is 41.99 kg/m² as calculated from the following:    Height as of 8/16/22: 152.4 cm (60\").    Weight as of 8/16/22: 97.5 kg (215 lb).    Class 3 Severe Obesity (BMI >=40). Obesity-related health conditions " include the following: hypertension, dyslipidemias, GERD and osteoarthritis. Obesity is improving with lifestyle modifications. BMI is is above average; BMI management plan is completed. We discussed low calorie, low carb based diet program, portion control, increasing exercise, joining a fitness center or start home based exercise program and Weight Watchers or other Commercial based weight reduction program.      Physical Exam  Constitutional:       Appearance: Normal appearance. She is well-developed and normal weight.   HENT:      Head: Normocephalic and atraumatic.      Right Ear: Tympanic membrane, ear canal and external ear normal.      Left Ear: Tympanic membrane, ear canal and external ear normal.      Nose: Nose normal.      Mouth/Throat:      Mouth: Mucous membranes are moist.      Pharynx: Oropharynx is clear. No oropharyngeal exudate.   Eyes:      Extraocular Movements: Extraocular movements intact.      Conjunctiva/sclera: Conjunctivae normal.      Pupils: Pupils are equal, round, and reactive to light.   Cardiovascular:      Rate and Rhythm: Normal rate and regular rhythm.      Pulses: Normal pulses.      Heart sounds: Normal heart sounds.   Pulmonary:      Effort: Pulmonary effort is normal.      Breath sounds: Normal breath sounds.   Abdominal:      General: Bowel sounds are normal.      Palpations: Abdomen is soft.   Musculoskeletal:         General: Normal range of motion.      Cervical back: Normal range of motion and neck supple.      Comments: Bilateral knee pain especially laterally and along each LCL and outside of lateral shin areas bilaterally.  Calfs also sore.      Left shoulder sore and tender posteriorly.  FROM though.   Skin:     General: Skin is warm and dry.   Neurological:      General: No focal deficit present.      Mental Status: She is alert and oriented to person, place, and time. Mental status is at baseline.   Psychiatric:         Mood and Affect: Mood normal.          Behavior: Behavior normal.         Thought Content: Thought content normal.         Judgment: Judgment normal.            Arthrocentesis    Date/Time: 11/10/2022 2:57 PM  Performed by: Rubens Cali MD  Authorized by: Rubens Cali MD   Indications: pain   Body area: shoulder  Joint: left shoulder  Local anesthesia used: yes  Anesthesia: local infiltration    Anesthesia:  Local anesthesia used: yes  Local Anesthetic: lidocaine 2% without epinephrine  Anesthetic total: 1 mL    Sedation:  Patient sedated: no    Preparation: Patient was prepped and draped in the usual sterile fashion.  Needle size: 22 G  Ultrasound guidance: no  Approach: posterior  Aspirate amount: 0 mL  Meds administered: 80 mg triamcinolone acetonide 40 MG/ML  Patient tolerance: patient tolerated the procedure well with no immediate complications  Comments: Posterior approach left shoulder                   Assessment and Plan     1. Acute pain, left shoulder  Sindy has had pain in her left shoulder for the last 4 to 6 weeks. The pain is rather intense at times, particularly when she tries to lift her elbow laterally. When she reaches forward with her shoulder, the pain on examination is posterior and deep in the triceps area of the shoulder. I put an injection into the shoulder posterior and then up underneath the subacromial bursa. I laid cortisone down the entire length of that needle and I think hopefully she will get some relief. I think she has mainly a posterior tendinitis with an element of some arthritis in that shoulder also.    2. Chronic pain, both knees  I am pretty sure both knees are involved with arthritis and she has had pain at night laterally and down into the lateral shin area and into her calves. It is very symmetric and I am almost certain it is due to osteoarthritis, but her activities during the day aggravate it, particularly her bending. I think with some physical therapy, the knees should respond. She needs to  strengthen her quadriceps and her calf muscles to support the knees better and I think then they will not be as tender by evening. By evening, she has built up a lot of inflammation in the knees and they just throb most of the night for her. By morning, they are better because she has been off of them all night, but nonetheless the first 4 hours of laying down can be quite miserable for her. She is going to take a hot bath every night right before bed. She is going to put ice packs on the knees right before bed and she is going to take her meloxicam in the evening now instead of in the morning. We are going to see if this regimen helps. This along with physical therapy will hopefully help her.     I spent 38 minutes caring for Sindy on this date of service. This time includes time spent by me in the following activities:preparing for the visit, reviewing tests, obtaining and/or reviewing a separately obtained history, performing a medically appropriate examination and/or evaluation , counseling and educating the patient/family/caregiver, ordering medications, tests, or procedures, referring and communicating with other health care professionals , documenting information in the medical record, independently interpreting results and communicating that information with the patient/family/caregiver and care coordination     Follow Up   Return in about 3 months (around 2/10/2023), or if symptoms worsen or fail to improve, for Recheck.  Patient was given instructions and counseling regarding her condition or for health maintenance advice. Please see specific information pulled into the AVS if appropriate.       Answers for HPI/ROS submitted by the patient on 11/4/2022  Please describe your symptoms.: Pain in left shoulder with pain going down into bicep and across left collarbone and sometimes into left lower jaw.  The pain spot is near the top of the shoulder on the posterior side.  This is different from the previous  issue of pain in the left shoulder blade.  This is more like a bursitis/tendonitis pain.  Have you had these symptoms before?: Yes  How long have you been having these symptoms?: Greater than 2 weeks  Please list any medications you are currently taking for this condition.: Taking Aleve for pain, but that doesn't really help.  Apply heat patches to help with pain at night.  Please describe any probable cause for these symptoms. : Just the normal wear and tear and a chronic syndrome.  What is the primary reason for your visit?: Other    Transcribed from ambient dictation for Rubens Cali MD by Avis Mckay.  11/10/22   15:39 EST    Patient or patient representative verbalized consent to the visit recording.  I have personally performed the services described in this document as transcribed by the above individual, and it is both accurate and complete.  Rubens Cali MD  11/14/2022  08:55 EST  Avis Mckay

## 2022-11-14 RX ORDER — TRIAMCINOLONE ACETONIDE 40 MG/ML
80 INJECTION, SUSPENSION INTRA-ARTICULAR; INTRAMUSCULAR
Status: COMPLETED | OUTPATIENT
Start: 2022-11-10 | End: 2022-11-10

## 2022-11-28 RX ORDER — ATORVASTATIN CALCIUM 20 MG/1
TABLET, FILM COATED ORAL
Qty: 90 TABLET | Refills: 1 | Status: SHIPPED | OUTPATIENT
Start: 2022-11-28

## 2022-11-29 ENCOUNTER — TREATMENT (OUTPATIENT)
Dept: PHYSICAL THERAPY | Facility: CLINIC | Age: 62
End: 2022-11-29

## 2022-11-29 DIAGNOSIS — M25.562 BILATERAL CHRONIC KNEE PAIN: Primary | ICD-10-CM

## 2022-11-29 DIAGNOSIS — G89.29 BILATERAL CHRONIC KNEE PAIN: Primary | ICD-10-CM

## 2022-11-29 DIAGNOSIS — G89.29 CHRONIC LEFT SHOULDER PAIN: ICD-10-CM

## 2022-11-29 DIAGNOSIS — M25.512 CHRONIC LEFT SHOULDER PAIN: ICD-10-CM

## 2022-11-29 DIAGNOSIS — M25.561 BILATERAL CHRONIC KNEE PAIN: Primary | ICD-10-CM

## 2022-11-29 PROCEDURE — 97140 MANUAL THERAPY 1/> REGIONS: CPT | Performed by: PHYSICAL THERAPIST

## 2022-11-29 PROCEDURE — 97162 PT EVAL MOD COMPLEX 30 MIN: CPT | Performed by: PHYSICAL THERAPIST

## 2022-11-29 NOTE — PROGRESS NOTES
Physical Therapy Initial Evaluation and Plan of Care    Patient: Sindy Martin   : 1960  Diagnosis/ICD-10 Code:  Bilateral chronic knee pain [M25.561, M25.562, G89.29]  Referring practitioner: Rubens Cali MD  Date of initial visit : 2022  Today's Date: 2022  Patient seen for 1  session    Visit Diagnoses:    ICD-10-CM ICD-9-CM   1. Bilateral chronic knee pain  M25.561 719.46    M25.562 338.29    G89.29    2. Chronic left shoulder pain  M25.512 719.41    G89.29 338.29        Subjective Evaluation    History of Present Illness  Mechanism of injury: Patient is a 62 year old female who presents with a diagnosis of bilateral knee and left shoulder pain.  She report several month history of knee pain with no specific cause or change in activity.  Reports pain on the outside of her knees with steps and often at night and kneeling.  Reports had imaging showing some degenerative changes in her knees.  Also reports had several month increase in left shoulder pain but has mostly resolved since cortisone injection with prior pain with reaching/lifting.     HR: 82  O2 saturation: 98 %  BP: 136/84 mmHG    Subjective comment: Quick DASH 5% ADL's, Knee Outcome Survey 85%  Patient Occupation:  Quality of life: good    Pain  Current pain ratin  At best pain ratin  At worst pain ratin  Pain location: Bilateral knees and left shoulder   Relieving factors: medications, heat, change in position and rest  Aggravating factors: overhead activity, stairs, ambulation and lifting (Kneeling)  Symptom course: Left shoulder improved, knees no change.    Hand dominance: right    Diagnostic Tests  Abnormal x-ray: See media.    Treatments  Treatments tried: None recently for her knees - recent injection in her left shoulder and prior history of PT for her neck/shoulder/knees.  Patient Goals  Patient goals for therapy: decreased pain  Patient's goals regarding treatment: Decreased pain with sleeping  and ADL's.           Objective          Active Range of Motion   Left Shoulder   Flexion: 150 degrees   Abduction: WFL  External rotation 90°: 40 degrees   Internal rotation BTB: T10     Right Shoulder   Flexion: 166 degrees   Abduction: WFL  External rotation 90°: 45 degrees  Internal rotation BTB: T8   Left Knee   Flexion: 128 degrees   Extensor la degrees     Right Knee   Flexion: 123 degrees   Extensor la degrees     Additional Active Range of Motion Details  Decreased tibial IR R/L     Patellar Mobility     Right Knee Hypomobile in the superior and inferior patellar tendon(s).     Joint Play   Left Shoulder  Hypomobile in the posterior capsule, inferior capsule and long axis distraction.    Strength/Myotome Testing     Left Shoulder     Planes of Motion   Flexion: 4   External rotation at 0°: 4+   Internal rotation at 0°: 5     Right Shoulder     Planes of Motion   Flexion: 4+   External rotation at 0°: 4+   Internal rotation at 0°: 5     Left Knee   Flexion: 5  Extension: 4+  Quadriceps contraction: good    Right Knee   Flexion: 5  Extension: 4+  Quadriceps contraction: fair    Ambulation     Comments   ER orientation of both UE/LE          Assessment & Plan     Assessment  Impairments: abnormal gait, abnormal or restricted ROM, impaired physical strength, lacks appropriate home exercise program and pain with function  Functional Limitations: carrying objects, lifting, sleeping, walking, pulling, pushing, reaching behind back, reaching overhead and unable to perform repetitive tasks  Assessment details: Presents with limit in bilateral knee ROM with greater limits in knee FL, L shoulder AROM/PROM/joint mobility, L shoulder strength and activity limits per the above including sleeping, stairs, walking, kneeling, and lifting.  She would benefit from skilled PT to address the above and restore to prior level of function.   Prognosis: good    Goals  Plan Goals: ST. Bilateral knee FL AROM improved 5-8  degrees or greater over 2 weeks.   2. L shoulder FL/ER AROM improved 15 degrees each or greater over 2 weeks.   3. Independent and compliant with HEP over 2 weeks.     LT. Knee Outcome Survey score improved to 95% or greater over 8 weeks.   2. Quick DASH for ADL's to stay at or below 15% or less over 8 weeks with resumption of prior activity.   3. L shoulder strength all planes 5/5 to all return to prior level of activity over 8 weeks.   4. Bilateral knee pain 2/10 or less with sleeping with 50% or greater decrease in walking up during sleep per pain.     Plan  Therapy options: will be seen for skilled therapy services  Planned modality interventions: cryotherapy, dry needling, electrical stimulation/Russian stimulation and TENS  Planned therapy interventions: manual therapy, neuromuscular re-education, flexibility, soft tissue mobilization, spinal/joint mobilization, functional ROM exercises, strengthening, stretching, home exercise program, therapeutic activities and joint mobilization  Frequency: 2x week  Duration in weeks: 8  Treatment plan discussed with: patient        History # of Personal Factors and/or Comorbidities: MODERATE (1-2)  Examination of Body System(s): # of elements: MODERATE (3)  Clinical Presentation: EVOLVING  Clinical Decision Making: MODERATE       Timed:         Manual Therapy:    10     mins  82108;     Therapeutic Exercise:    5     mins  81555;         Un-Timed:  Mod Eval     30     Mins  88582      Timed Treatment:   15   mins   Total Treatment:     45   mins          PT SIGNATURE: Mert Baron PT, DPT   [place] Lic #[id number]    DATE TREATMENT INITIATED: 2022    Initial Certification  Certification Period: 2023  I certify that the therapy services are furnished while this patient is under my care.  The services outlined above are required by this patient, and will be reviewed every 90 days.     PHYSICIAN: Rubens Cali MD      DATE:     Please sign and return via fax to  [phone].. Thank you, [facility].

## 2022-12-01 ENCOUNTER — TREATMENT (OUTPATIENT)
Dept: PHYSICAL THERAPY | Facility: CLINIC | Age: 62
End: 2022-12-01

## 2022-12-01 DIAGNOSIS — M25.561 BILATERAL CHRONIC KNEE PAIN: Primary | ICD-10-CM

## 2022-12-01 DIAGNOSIS — G89.29 BILATERAL CHRONIC KNEE PAIN: Primary | ICD-10-CM

## 2022-12-01 DIAGNOSIS — M25.512 CHRONIC LEFT SHOULDER PAIN: ICD-10-CM

## 2022-12-01 DIAGNOSIS — M25.562 BILATERAL CHRONIC KNEE PAIN: Primary | ICD-10-CM

## 2022-12-01 DIAGNOSIS — G89.29 CHRONIC LEFT SHOULDER PAIN: ICD-10-CM

## 2022-12-01 PROCEDURE — 97110 THERAPEUTIC EXERCISES: CPT | Performed by: PHYSICAL THERAPIST

## 2022-12-01 PROCEDURE — 97112 NEUROMUSCULAR REEDUCATION: CPT | Performed by: PHYSICAL THERAPIST

## 2022-12-01 PROCEDURE — 97140 MANUAL THERAPY 1/> REGIONS: CPT | Performed by: PHYSICAL THERAPIST

## 2022-12-01 NOTE — PROGRESS NOTES
Physical Therapy Daily Treatment Note  Visit: 2    Sindy Martin reports: did well with initial visit.   YOB: 1960  Referring practitioner : Rubens Cali MD  Date of Initial: Type: THERAPY  Noted: 11/29/2022  Today's date: 12/1/2022  Patient seen for 2 sessions    Visit Diagnoses:    ICD-10-CM ICD-9-CM   1. Bilateral chronic knee pain  M25.561 719.46    M25.562 338.29    G89.29    2. Chronic left shoulder pain  M25.512 719.41    G89.29 338.29       Subjective       Objective   See Exercise, Manual, and Modality Logs for complete treatment.       Assessment/Plan    L shoulder ROM improved both actively and passively.  Required moderate cueing with PPT during supine hamstring iso.     Plan:    Continue current         Timed:         Manual Therapy:    12     mins  03051;     Therapeutic Exercise:    16     mins  33274;     Neuromuscular Corey:    12    mins  79688;          Timed Treatment:   40   mins   Total Treatment:     40   mins         Mert Baron PT, DPT  Physical Therapist  [place] Lic #[id number]

## 2022-12-06 ENCOUNTER — TREATMENT (OUTPATIENT)
Dept: PHYSICAL THERAPY | Facility: CLINIC | Age: 62
End: 2022-12-06

## 2022-12-06 DIAGNOSIS — G89.29 CHRONIC LEFT SHOULDER PAIN: ICD-10-CM

## 2022-12-06 DIAGNOSIS — M25.512 CHRONIC LEFT SHOULDER PAIN: ICD-10-CM

## 2022-12-06 DIAGNOSIS — M25.561 BILATERAL CHRONIC KNEE PAIN: Primary | ICD-10-CM

## 2022-12-06 DIAGNOSIS — G89.29 BILATERAL CHRONIC KNEE PAIN: Primary | ICD-10-CM

## 2022-12-06 DIAGNOSIS — M25.562 BILATERAL CHRONIC KNEE PAIN: Primary | ICD-10-CM

## 2022-12-06 PROCEDURE — 97112 NEUROMUSCULAR REEDUCATION: CPT | Performed by: PHYSICAL THERAPIST

## 2022-12-06 PROCEDURE — 97140 MANUAL THERAPY 1/> REGIONS: CPT | Performed by: PHYSICAL THERAPIST

## 2022-12-06 PROCEDURE — 97110 THERAPEUTIC EXERCISES: CPT | Performed by: PHYSICAL THERAPIST

## 2022-12-06 NOTE — PROGRESS NOTES
Physical Therapy Daily Progress Note      Patient: Sindy Martin   : 1960  Diagnosis/ICD-10 Code:  Bilateral chronic knee pain [M25.561, M25.562, G89.29]  Referring practitioner: Rubens Cali MD  Date of Initial Visit: Type: THERAPY  Noted: 2022  Today's Date: 2022  Patient seen for 3 sessions             Subjective No a lot of change to report today compared to last visit.    Objective   See Exercise, Manual, and Modality Logs for complete treatment.       Assessment/Plan  Responded well to manual left shoulder PROM, stating it felt good to have her shoulder stretched.  Good tolerance with exercises overall.    Progress per Plan of Care           Timed:         Manual Therapy:    15     mins  28607;     Therapeutic Exercise:    15     mins  87612;     Neuromuscular Corey:    15    mins  91549;        Timed Treatment:   45   mins   Total Treatment:     45   mins        Librado Grant PTA  Physical Therapist Assistant

## 2022-12-08 ENCOUNTER — TREATMENT (OUTPATIENT)
Dept: PHYSICAL THERAPY | Facility: CLINIC | Age: 62
End: 2022-12-08

## 2022-12-08 DIAGNOSIS — G89.29 BILATERAL CHRONIC KNEE PAIN: Primary | ICD-10-CM

## 2022-12-08 DIAGNOSIS — M25.561 BILATERAL CHRONIC KNEE PAIN: Primary | ICD-10-CM

## 2022-12-08 DIAGNOSIS — M25.562 BILATERAL CHRONIC KNEE PAIN: Primary | ICD-10-CM

## 2022-12-08 DIAGNOSIS — G89.29 CHRONIC LEFT SHOULDER PAIN: ICD-10-CM

## 2022-12-08 DIAGNOSIS — M25.512 CHRONIC LEFT SHOULDER PAIN: ICD-10-CM

## 2022-12-08 PROCEDURE — 97140 MANUAL THERAPY 1/> REGIONS: CPT | Performed by: PHYSICAL THERAPIST

## 2022-12-08 PROCEDURE — 97110 THERAPEUTIC EXERCISES: CPT | Performed by: PHYSICAL THERAPIST

## 2022-12-08 PROCEDURE — 97530 THERAPEUTIC ACTIVITIES: CPT | Performed by: PHYSICAL THERAPIST

## 2022-12-09 NOTE — PROGRESS NOTES
Physical Therapy Daily Treatment Note  Visit: 4    Sindy Martin reports: did well with last visit and was able to resume some exercise activity at [facility] with plans to continue in addition to PT.   YOB: 1960  Referring practitioner : Rubens Cali MD  Date of Initial: Type: THERAPY  Noted: 11/29/2022  Today's date: 12/9/2022  Patient seen for 4 sessions    Visit Diagnoses:    ICD-10-CM ICD-9-CM   1. Bilateral chronic knee pain  M25.561 719.46    M25.562 338.29    G89.29    2. Chronic left shoulder pain  M25.512 719.41    G89.29 338.29       Subjective       Objective   See Exercise, Manual, and Modality Logs for complete treatment.       Assessment/Plan     Pain free L shoulder and bilateral knee ROM improved post tx.     Plan:    Continue current             Timed:         Manual Therapy:    15     mins  98624;     Therapeutic Exercise:    20     mins  48658;     Therapeutic Activity:     9     mins  66721;           Timed Treatment:   44   mins   Total Treatment:     44   mins         Mert Baron PT, DPT  Physical Therapist  [place] Lic #[id number]

## 2022-12-13 ENCOUNTER — TREATMENT (OUTPATIENT)
Dept: PHYSICAL THERAPY | Facility: CLINIC | Age: 62
End: 2022-12-13

## 2022-12-13 DIAGNOSIS — M25.562 BILATERAL CHRONIC KNEE PAIN: Primary | ICD-10-CM

## 2022-12-13 DIAGNOSIS — G89.29 CHRONIC LEFT SHOULDER PAIN: ICD-10-CM

## 2022-12-13 DIAGNOSIS — M25.512 CHRONIC LEFT SHOULDER PAIN: ICD-10-CM

## 2022-12-13 DIAGNOSIS — G89.29 BILATERAL CHRONIC KNEE PAIN: Primary | ICD-10-CM

## 2022-12-13 DIAGNOSIS — M25.561 BILATERAL CHRONIC KNEE PAIN: Primary | ICD-10-CM

## 2022-12-13 PROCEDURE — 97110 THERAPEUTIC EXERCISES: CPT | Performed by: PHYSICAL THERAPIST

## 2022-12-13 PROCEDURE — 97140 MANUAL THERAPY 1/> REGIONS: CPT | Performed by: PHYSICAL THERAPIST

## 2022-12-13 PROCEDURE — 97530 THERAPEUTIC ACTIVITIES: CPT | Performed by: PHYSICAL THERAPIST

## 2022-12-13 NOTE — PROGRESS NOTES
Physical Therapy Daily Progress Note      Patient: Sindy Martin   : 1960  Diagnosis/ICD-10 Code:  Bilateral chronic knee pain [M25.561, M25.562, G89.29]  Referring practitioner: Rubens Cali MD  Date of Initial Visit: Type: THERAPY  Noted: 2022  Today's Date: 2022  Patient seen for 5 sessions             Subjective Pt says her left shoulder is improving better than her knees.  She still has pain in both knees and down into her lateral lower legs.    Objective   See Exercise, Manual, and Modality Logs for complete treatment.       Assessment/Plan  Shoulder ROM progressing well.  Pt seems to have greater issues with her knees in general, but overall tolerated exercises well today.    Progress per Plan of Care           Timed:         Manual Therapy:   15     mins  75253;     Therapeutic Exercise:    20     mins  02163;      Therapeutic Activity:     8     mins  23682;         Timed Treatment:   43   mins   Total Treatment:     43   mins        Librado Grant PTA  Physical Therapist Assistant

## 2022-12-15 ENCOUNTER — TREATMENT (OUTPATIENT)
Dept: PHYSICAL THERAPY | Facility: CLINIC | Age: 62
End: 2022-12-15

## 2022-12-15 DIAGNOSIS — M25.512 CHRONIC LEFT SHOULDER PAIN: ICD-10-CM

## 2022-12-15 DIAGNOSIS — G89.29 CHRONIC LEFT SHOULDER PAIN: ICD-10-CM

## 2022-12-15 DIAGNOSIS — G89.29 BILATERAL CHRONIC KNEE PAIN: Primary | ICD-10-CM

## 2022-12-15 DIAGNOSIS — M25.562 BILATERAL CHRONIC KNEE PAIN: Primary | ICD-10-CM

## 2022-12-15 DIAGNOSIS — M25.561 BILATERAL CHRONIC KNEE PAIN: Primary | ICD-10-CM

## 2022-12-15 PROCEDURE — 97530 THERAPEUTIC ACTIVITIES: CPT | Performed by: PHYSICAL THERAPIST

## 2022-12-15 PROCEDURE — 97110 THERAPEUTIC EXERCISES: CPT | Performed by: PHYSICAL THERAPIST

## 2022-12-15 PROCEDURE — 97140 MANUAL THERAPY 1/> REGIONS: CPT | Performed by: PHYSICAL THERAPIST

## 2022-12-15 NOTE — PROGRESS NOTES
Physical Therapy Daily Progress Note      Patient: Sindy Martin   : 1960  Diagnosis/ICD-10 Code:  Bilateral chronic knee pain [M25.561, M25.562, G89.29]  Referring practitioner: Rubens Cali MD  Date of Initial Visit: Type: THERAPY  Noted: 2022  Today's Date: 12/15/2022  Patient seen for 6 sessions             Subjective Nothing new to report this morning.    Objective   See Exercise, Manual, and Modality Logs for complete treatment.       Assessment/Plan  Pt is progressing well with ROM and strengthening exercises.    Progress per Plan of Care           Timed:         Manual Therapy:    15     mins  97797;     Therapeutic Exercise:    20     mins  60346;     Therapeutic Activity:     10     mins  59170;         Timed Treatment:   45   mins   Total Treatment:     45   mins        Librado Grant PTA  Physical Therapist Assistant

## 2022-12-20 ENCOUNTER — TREATMENT (OUTPATIENT)
Dept: PHYSICAL THERAPY | Facility: CLINIC | Age: 62
End: 2022-12-20

## 2022-12-20 DIAGNOSIS — M25.561 BILATERAL CHRONIC KNEE PAIN: Primary | ICD-10-CM

## 2022-12-20 DIAGNOSIS — M25.562 BILATERAL CHRONIC KNEE PAIN: Primary | ICD-10-CM

## 2022-12-20 DIAGNOSIS — G89.29 CHRONIC LEFT SHOULDER PAIN: ICD-10-CM

## 2022-12-20 DIAGNOSIS — M25.512 CHRONIC LEFT SHOULDER PAIN: ICD-10-CM

## 2022-12-20 DIAGNOSIS — G89.29 BILATERAL CHRONIC KNEE PAIN: Primary | ICD-10-CM

## 2022-12-20 PROCEDURE — 97110 THERAPEUTIC EXERCISES: CPT | Performed by: PHYSICAL THERAPIST

## 2022-12-20 PROCEDURE — 97140 MANUAL THERAPY 1/> REGIONS: CPT | Performed by: PHYSICAL THERAPIST

## 2022-12-20 NOTE — PROGRESS NOTES
Physical Therapy Daily Treatment Note  Visit: 7    Sindy Martin reports: she can tell improvement in her shoulder pain but little change to her knee pain yet.   YOB: 1960  Referring practitioner : Rubens Cali MD  Date of Initial: Type: THERAPY  Noted: 11/29/2022  Today's date: 12/20/2022  Patient seen for 7 sessions    Visit Diagnoses:    ICD-10-CM ICD-9-CM   1. Bilateral chronic knee pain  M25.561 719.46    M25.562 338.29    G89.29    2. Chronic left shoulder pain  M25.512 719.41    G89.29 338.29       Subjective       Objective   See Exercise, Manual, and Modality Logs for complete treatment.       Assessment/Plan    Both shoulder and knee ROM improving with decreased shoulder pain and improved gait mechanics.     Plan:    Continue current           Timed:         Manual Therapy:    12     mins  58580;     Therapeutic Exercise:    30     mins  27353;         Timed Treatment:   42   mins   Total Treatment:     42   mins         Mert Baron PT, DPT  Physical Therapist  [place] Lic #[id number]

## 2022-12-22 ENCOUNTER — TREATMENT (OUTPATIENT)
Dept: PHYSICAL THERAPY | Facility: CLINIC | Age: 62
End: 2022-12-22

## 2022-12-22 DIAGNOSIS — M25.512 CHRONIC LEFT SHOULDER PAIN: ICD-10-CM

## 2022-12-22 DIAGNOSIS — G89.29 CHRONIC LEFT SHOULDER PAIN: ICD-10-CM

## 2022-12-22 DIAGNOSIS — M25.561 BILATERAL CHRONIC KNEE PAIN: Primary | ICD-10-CM

## 2022-12-22 DIAGNOSIS — G89.29 BILATERAL CHRONIC KNEE PAIN: Primary | ICD-10-CM

## 2022-12-22 DIAGNOSIS — M25.562 BILATERAL CHRONIC KNEE PAIN: Primary | ICD-10-CM

## 2022-12-22 PROCEDURE — 97110 THERAPEUTIC EXERCISES: CPT | Performed by: PHYSICAL THERAPIST

## 2022-12-22 PROCEDURE — 97112 NEUROMUSCULAR REEDUCATION: CPT | Performed by: PHYSICAL THERAPIST

## 2022-12-22 PROCEDURE — 97140 MANUAL THERAPY 1/> REGIONS: CPT | Performed by: PHYSICAL THERAPIST

## 2022-12-22 NOTE — PROGRESS NOTES
Physical Therapy Daily Treatment Note  Visit: 8    Sindy Martin reports: has noticed improved ability to get up from ground per decreased knee pain.   YOB: 1960  Referring practitioner : Rubens Cali MD  Date of Initial: Type: THERAPY  Noted: 11/29/2022  Today's date: 12/22/2022  Patient seen for 8 sessions    Visit Diagnoses:    ICD-10-CM ICD-9-CM   1. Bilateral chronic knee pain  M25.561 719.46    M25.562 338.29    G89.29    2. Chronic left shoulder pain  M25.512 719.41    G89.29 338.29       Subjective       Objective   See Exercise, Manual, and Modality Logs for complete treatment.       Assessment/Plan     Progressing well with increasing L shoulder and bilateral knee ROM/strength/function.     Plan:    Continue current           Timed:         Manual Therapy:    12     mins  70782;     Therapeutic Exercise:    21     mins  16715;     Neuromuscular Corey:    10    mins  12070;        Timed Treatment:   43   mins   Total Treatment:     43   mins         Mert Baron PT, DPT  Physical Therapist  [place] Lic #[id number]

## 2022-12-27 ENCOUNTER — TREATMENT (OUTPATIENT)
Dept: PHYSICAL THERAPY | Facility: CLINIC | Age: 62
End: 2022-12-27

## 2022-12-27 DIAGNOSIS — M25.512 CHRONIC LEFT SHOULDER PAIN: ICD-10-CM

## 2022-12-27 DIAGNOSIS — G89.29 BILATERAL CHRONIC KNEE PAIN: Primary | ICD-10-CM

## 2022-12-27 DIAGNOSIS — G89.29 CHRONIC LEFT SHOULDER PAIN: ICD-10-CM

## 2022-12-27 DIAGNOSIS — M25.562 BILATERAL CHRONIC KNEE PAIN: Primary | ICD-10-CM

## 2022-12-27 DIAGNOSIS — M25.561 BILATERAL CHRONIC KNEE PAIN: Primary | ICD-10-CM

## 2022-12-27 PROCEDURE — 97110 THERAPEUTIC EXERCISES: CPT | Performed by: PHYSICAL THERAPIST

## 2022-12-27 PROCEDURE — 97112 NEUROMUSCULAR REEDUCATION: CPT | Performed by: PHYSICAL THERAPIST

## 2022-12-27 PROCEDURE — 97140 MANUAL THERAPY 1/> REGIONS: CPT | Performed by: PHYSICAL THERAPIST

## 2022-12-27 NOTE — PROGRESS NOTES
Physical Therapy Daily Progress Note      Patient: Sindy Martin   : 1960  Diagnosis/ICD-10 Code:  Bilateral chronic knee pain [M25.561, M25.562, G89.29]  Referring practitioner: Rubens Cali MD  Date of Initial Visit: Type: THERAPY  Noted: 2022  Today's Date: 2022  Patient seen for 9 sessions             Subjective Kneeling continues to be much easier and transferring kneeling to standing.    Objective   See Exercise, Manual, and Modality Logs for complete treatment.       Assessment/Plan  Good effort and tolerance with exercises and manual techniques.    Progress per Plan of Care           Timed:         Manual Therapy:    12    mins  88340;     Therapeutic Exercise:    21     mins  90313;     Neuromuscular Corey:    11    mins  44484;        Timed Treatment:   44   mins   Total Treatment:     44   mins        Librado Grant PTA  Physical Therapist Assistant

## 2022-12-29 ENCOUNTER — TREATMENT (OUTPATIENT)
Dept: PHYSICAL THERAPY | Facility: CLINIC | Age: 62
End: 2022-12-29

## 2022-12-29 DIAGNOSIS — G89.29 BILATERAL CHRONIC KNEE PAIN: Primary | ICD-10-CM

## 2022-12-29 DIAGNOSIS — G89.29 CHRONIC LEFT SHOULDER PAIN: ICD-10-CM

## 2022-12-29 DIAGNOSIS — M25.562 BILATERAL CHRONIC KNEE PAIN: Primary | ICD-10-CM

## 2022-12-29 DIAGNOSIS — M25.561 BILATERAL CHRONIC KNEE PAIN: Primary | ICD-10-CM

## 2022-12-29 DIAGNOSIS — M25.512 CHRONIC LEFT SHOULDER PAIN: ICD-10-CM

## 2022-12-29 PROCEDURE — 97112 NEUROMUSCULAR REEDUCATION: CPT | Performed by: PHYSICAL THERAPIST

## 2022-12-29 PROCEDURE — 97110 THERAPEUTIC EXERCISES: CPT | Performed by: PHYSICAL THERAPIST

## 2022-12-29 NOTE — PROGRESS NOTES
Physical Therapy Daily Progress Note      Patient: Sindy Martin   : 1960  Diagnosis/ICD-10 Code:  Bilateral chronic knee pain [M25.561, M25.562, G89.29]  Referring practitioner: Rubens Cali MD  Date of Initial Visit: Type: THERAPY  Noted: 2022  Today's Date: 2022  Patient seen for 10 sessions             Subjective  Pt reports having fallen out of her bed this morning when she went to hit the snooze button.  She landed on her knees, scrapping her right knee, and landed on her left shoulder.  She doesn't feel that she seriously injured anything but is sore.    Objective   See Exercise, Manual, and Modality Logs for complete treatment.       Assessment/Plan  Pt responded well to reduced intensity of exercises and holding manual techniques due to falling out of bed, making shoulder and knees sore.    Progress per Plan of Care           Timed:          Therapeutic Exercise:    25     mins  85736;     Neuromuscular Corey:    13    mins  44317;          Timed Treatment:   38   mins   Total Treatment:     38   mins        Librado Grant PTA  Physical Therapist Assistant

## 2022-12-30 RX ORDER — MELOXICAM 15 MG/1
TABLET ORAL
Qty: 90 TABLET | Refills: 0 | Status: SHIPPED | OUTPATIENT
Start: 2022-12-30 | End: 2023-04-06

## 2023-01-03 ENCOUNTER — TREATMENT (OUTPATIENT)
Dept: PHYSICAL THERAPY | Facility: CLINIC | Age: 63
End: 2023-01-03
Payer: COMMERCIAL

## 2023-01-03 DIAGNOSIS — G89.29 BILATERAL CHRONIC KNEE PAIN: Primary | ICD-10-CM

## 2023-01-03 DIAGNOSIS — M25.562 BILATERAL CHRONIC KNEE PAIN: Primary | ICD-10-CM

## 2023-01-03 DIAGNOSIS — G89.29 CHRONIC LEFT SHOULDER PAIN: ICD-10-CM

## 2023-01-03 DIAGNOSIS — M25.512 CHRONIC LEFT SHOULDER PAIN: ICD-10-CM

## 2023-01-03 DIAGNOSIS — M25.561 BILATERAL CHRONIC KNEE PAIN: Primary | ICD-10-CM

## 2023-01-03 PROCEDURE — 97110 THERAPEUTIC EXERCISES: CPT | Performed by: PHYSICAL THERAPIST

## 2023-01-03 PROCEDURE — 97140 MANUAL THERAPY 1/> REGIONS: CPT | Performed by: PHYSICAL THERAPIST

## 2023-01-03 NOTE — PROGRESS NOTES
Physical Therapy Daily Treatment Note  Visit: 11    Sindy Martin reports: feel before last visit at home and has some bruising on her right knee and left shoulders.  Reports did well with last visit and did not have any increased pain.   YOB: 1960  Referring practitioner : Rubens Cali MD  Date of Initial: Type: THERAPY  Noted: 11/29/2022  Today's date: 1/3/2023  Patient seen for 11 sessions    Visit Diagnoses:    ICD-10-CM ICD-9-CM   1. Bilateral chronic knee pain  M25.561 719.46    M25.562 338.29    G89.29    2. Chronic left shoulder pain  M25.512 719.41    G89.29 338.29       Subjective       Objective   See Exercise, Manual, and Modality Logs for complete treatment.       Assessment/Plan     No apparent injury from recent fall with good tolerance to ROM/strength activity of B UE/UE today.  Has made good progress with improved ROM/strength and activity tolerance with decreasing pain.     Plan:  Continue current           Timed:         Manual Therapy:    9     mins  04783;     Therapeutic Exercise:    30     mins  38766;           Timed Treatment:   39   mins   Total Treatment:     39   mins         Mert Baron, PT, DPT  Physical Therapist  [place] Lic #[id number]

## 2023-01-05 ENCOUNTER — TREATMENT (OUTPATIENT)
Dept: PHYSICAL THERAPY | Facility: CLINIC | Age: 63
End: 2023-01-05
Payer: COMMERCIAL

## 2023-01-05 DIAGNOSIS — M25.512 CHRONIC LEFT SHOULDER PAIN: ICD-10-CM

## 2023-01-05 DIAGNOSIS — M25.562 BILATERAL CHRONIC KNEE PAIN: Primary | ICD-10-CM

## 2023-01-05 DIAGNOSIS — G89.29 CHRONIC LEFT SHOULDER PAIN: ICD-10-CM

## 2023-01-05 DIAGNOSIS — M25.561 BILATERAL CHRONIC KNEE PAIN: Primary | ICD-10-CM

## 2023-01-05 DIAGNOSIS — G89.29 BILATERAL CHRONIC KNEE PAIN: Primary | ICD-10-CM

## 2023-01-05 PROCEDURE — 97140 MANUAL THERAPY 1/> REGIONS: CPT | Performed by: PHYSICAL THERAPIST

## 2023-01-05 PROCEDURE — 97530 THERAPEUTIC ACTIVITIES: CPT | Performed by: PHYSICAL THERAPIST

## 2023-01-05 PROCEDURE — 97110 THERAPEUTIC EXERCISES: CPT | Performed by: PHYSICAL THERAPIST

## 2023-01-05 NOTE — PROGRESS NOTES
Physical Therapy Daily Treatment Note  Visit: 12    Sindy Martin reports: did well with last visit and feeling better overall.  Reports she can tell improvement in daily activities including dressing, lifting, kneeling since beginning PT.   YOB: 1960  Referring practitioner : Rubens Cali MD  Date of Initial: Type: THERAPY  Noted: 11/29/2022  Today's date: 1/5/2023  Patient seen for 12 sessions    Visit Diagnoses:    ICD-10-CM ICD-9-CM   1. Bilateral chronic knee pain  M25.561 719.46    M25.562 338.29    G89.29    2. Chronic left shoulder pain  M25.512 719.41    G89.29 338.29       Subjective       Objective   See Exercise, Manual, and Modality Logs for complete treatment.       Assessment/Plan     Progressing well with improvement L shoulder and knee ROM/strength and function.  Has recent fall which has limited progressions during PT visits. She would likely benefit with more PT to reach max improvement with transition to HEP only over the next month.     Plan:    Continue current             Timed:         Manual Therapy:    12     mins  72823;     Therapeutic Exercise:    21     mins  24789;      Therapeutic Activity:     10     mins  07123;         Timed Treatment:   43   mins   Total Treatment:     43   mins         Mert Baron, PT, DPT  Physical Therapist  [place] Lic #[id number]

## 2023-01-06 ENCOUNTER — OFFICE VISIT (OUTPATIENT)
Dept: FAMILY MEDICINE CLINIC | Facility: CLINIC | Age: 63
End: 2023-01-06
Payer: COMMERCIAL

## 2023-01-06 VITALS
RESPIRATION RATE: 16 BRPM | HEART RATE: 105 BPM | SYSTOLIC BLOOD PRESSURE: 140 MMHG | OXYGEN SATURATION: 98 % | DIASTOLIC BLOOD PRESSURE: 78 MMHG

## 2023-01-06 DIAGNOSIS — G89.29 CHRONIC PAIN OF BOTH KNEES: ICD-10-CM

## 2023-01-06 DIAGNOSIS — M75.52 BURSITIS OF LEFT SHOULDER: Primary | ICD-10-CM

## 2023-01-06 DIAGNOSIS — M25.561 CHRONIC PAIN OF BOTH KNEES: ICD-10-CM

## 2023-01-06 DIAGNOSIS — M46.1 SACROILIITIS: ICD-10-CM

## 2023-01-06 DIAGNOSIS — M25.562 CHRONIC PAIN OF BOTH KNEES: ICD-10-CM

## 2023-01-06 PROCEDURE — 99213 OFFICE O/P EST LOW 20 MIN: CPT | Performed by: FAMILY MEDICINE

## 2023-01-06 RX ORDER — TEMAZEPAM 15 MG/1
15-30 CAPSULE ORAL NIGHTLY PRN
Qty: 40 CAPSULE | Refills: 1 | Status: SHIPPED | OUTPATIENT
Start: 2023-01-06 | End: 2023-01-23 | Stop reason: SDUPTHER

## 2023-01-06 NOTE — PROGRESS NOTES
"Chief C+omplaint  Knee Pain (2 mo f/u)    Subjective      Sindy Martin presents to [facility]  History of Present Illness  For follow up on knee pain. She did fall 1 week ago she fell out of bed trying to turn off alarm.  Knee Pain     The patient presents today for a follow-up on a right shoulder injection.    The patient states that she fell out of bed on 12/30/2022. She states that she had pain in her collar bone and hit her cheek. She states that she has had 2 sessions of physical therapy. She states that she has another bruise from the same fall. She states that she has pain at night when she is trying to sleep. She states that the pain is located over the SI joint. She states that she has been using Aspercreme and over-the-counter medications. She states that she has used Salonpas patches in the past, which have been helpful. She states that she has been taking meloxicam at night. She states that she had an SI injection on 07/24/2022, which was helpful. She states that she was told that it is more of a diagnostic in a way than treatment. She states that she has seen a pain management doctor in the past. She states that she does not wear a CPAP at night. She states that she has never been tested for sleep apnea.    Objective   Vital Signs:  /78 (BP Location: Right arm, Cuff Size: Large Adult)   Pulse 105   Resp 16   SpO2 98%   Estimated body mass index is 41.99 kg/m² as calculated from the following:    Height as of 8/16/22: 152.4 cm (60\").    Weight as of 8/16/22: 97.5 kg (215 lb).    Class 3 Severe Obesity (BMI >=40). Obesity-related health conditions include the following: hypertension, coronary heart disease, diabetes mellitus, dyslipidemias and osteoarthritis. Obesity is improving with treatment. BMI is is above average; BMI management plan is completed. We discussed low calorie, low carb based diet program, portion control, increasing exercise, joining a " fitness center or start home based exercise program and Weight Watchers or other Commercial based weight reduction program.      Physical Exam  Constitutional:       Appearance: Normal appearance. She is well-developed and normal weight.   HENT:      Head: Normocephalic and atraumatic.      Right Ear: Tympanic membrane, ear canal and external ear normal.      Left Ear: Tympanic membrane, ear canal and external ear normal.      Nose: Nose normal.      Mouth/Throat:      Mouth: Mucous membranes are moist.      Pharynx: Oropharynx is clear. No oropharyngeal exudate.   Eyes:      Extraocular Movements: Extraocular movements intact.      Conjunctiva/sclera: Conjunctivae normal.      Pupils: Pupils are equal, round, and reactive to light.   Cardiovascular:      Rate and Rhythm: Normal rate and regular rhythm.      Pulses: Normal pulses.      Heart sounds: Normal heart sounds.   Pulmonary:      Effort: Pulmonary effort is normal.      Breath sounds: Normal breath sounds.   Abdominal:      General: Bowel sounds are normal.      Palpations: Abdomen is soft.   Musculoskeletal:         General: Normal range of motion.      Cervical back: Normal range of motion and neck supple.   Skin:     General: Skin is warm and dry.   Neurological:      General: No focal deficit present.      Mental Status: She is alert and oriented to person, place, and time. Mental status is at baseline.   Psychiatric:         Mood and Affect: Mood normal.         Behavior: Behavior normal.         Thought Content: Thought content normal.         Judgment: Judgment normal.        Assessment and Plan     1. Bursitis of the left shoulder  - Sindy is here for several reasons, one of which is to follow up on a left shoulder bursitis and rotator cuff tendinitis that she was seen for about 1.5 months ago. When she came in, we examined her and found her to show signs and symptoms of rotator cuff syndrome with subacromial bursitis. I injected her shoulder with  steroids and Marcaine and she tolerated the procedure well. Postoperatively, she has done quite well and she has basically no shoulder pain and she has almost all of her range of motion back. Unfortunately, she has developed some other pain and symptoms in other joints.    2. Sacroiliitis on the right  - The patient has had an aching, very bothersome right sacroiliitis. She saw Brenda Nayak PA-C, of the orthopedic staff who did a CT guided injection of her sacroiliac joint with some steroids and it did help for several weeks. Unfortunately, Brenda Nayak is no longer available and the patient is wondering what she should do now. I went ahead and scheduled her to see pain management in the form of physical therapy for the sacroiliitis in the form of pain management injections. I am going to have her see Dr. Tompkins and see if he would be willing to go ahead and keep these injections going since they did help for a few weeks. She has some exercises to use and then she is going to use some Theraspan or some sort of topical pain cream on this area. She also has some pain in her knees as described below.    3. Chronic pain of both knees  - The pain is on the outside of the knees bilaterally in the fascia that goes from the lateral part of the knee down to the lateral part of her calf. Both sides is about a 6 inch strip of soft tissue that is extremely tender and very uncomfortable for her. We will have her switch the Salonpas patches to that area and put some heat on it on top of that and we will see how she does. The first order of business though will be to get the injections in the right SI joint.       I spent 29 minutes caring for Sindy on this date of service. This time includes time spent by me in the following activities:preparing for the visit, reviewing tests, obtaining and/or reviewing a separately obtained history, performing a medically appropriate examination and/or evaluation , counseling and educating the  patient/family/caregiver, ordering medications, tests, or procedures, referring and communicating with other health care professionals , documenting information in the medical record, independently interpreting results and communicating that information with the patient/family/caregiver and care coordination     Follow Up   Return in about 4 weeks (around 2/3/2023), or if symptoms worsen or fail to improve, for Medicare Wellness.  Patient was given instructions and counseling regarding her condition or for health maintenance advice. Please see specific information pulled into the AVS if appropriate.       Answers for HPI/ROS submitted by the patient on 12/31/2022  Please describe your symptoms.: Follow-up to initial visit for pain issues in knees and left shoulder and PT treatments.  Have you had these symptoms before?: Yes  How long have you been having these symptoms?: Greater than 2 weeks  What is the primary reason for your visit?: Other    Transcribed from ambient dictation for Rubens Cali MD by Trudy Garay.  01/06/23   15:38 EST    Patient or patient representative verbalized consent to the visit recording.  I have personally performed the services described in this document as transcribed by the above individual, and it is both accurate and complete.  Rubens Cali MD  1/15/2023  19:58 EST

## 2023-01-10 ENCOUNTER — TREATMENT (OUTPATIENT)
Dept: PHYSICAL THERAPY | Facility: CLINIC | Age: 63
End: 2023-01-10
Payer: COMMERCIAL

## 2023-01-10 DIAGNOSIS — M25.512 CHRONIC LEFT SHOULDER PAIN: ICD-10-CM

## 2023-01-10 DIAGNOSIS — M25.562 BILATERAL CHRONIC KNEE PAIN: Primary | ICD-10-CM

## 2023-01-10 DIAGNOSIS — M25.561 BILATERAL CHRONIC KNEE PAIN: Primary | ICD-10-CM

## 2023-01-10 DIAGNOSIS — G89.29 BILATERAL CHRONIC KNEE PAIN: Primary | ICD-10-CM

## 2023-01-10 DIAGNOSIS — G89.29 CHRONIC LEFT SHOULDER PAIN: ICD-10-CM

## 2023-01-10 PROCEDURE — 97110 THERAPEUTIC EXERCISES: CPT | Performed by: PHYSICAL THERAPIST

## 2023-01-10 PROCEDURE — 97140 MANUAL THERAPY 1/> REGIONS: CPT | Performed by: PHYSICAL THERAPIST

## 2023-01-10 PROCEDURE — 97530 THERAPEUTIC ACTIVITIES: CPT | Performed by: PHYSICAL THERAPIST

## 2023-01-10 NOTE — PROGRESS NOTES
Physical Therapy Daily Progress Note      Patient: Sindy Martin   : 1960  Diagnosis/ICD-10 Code:  Bilateral chronic knee pain [M25.561, M25.562, G89.29]  Referring practitioner: Rubens Cali MD  Date of Initial Visit: Type: THERAPY  Noted: 2022  Today's Date: 1/10/2023  Patient seen for 13 sessions             Subjective Nothing new to report today.    Objective   See Exercise, Manual, and Modality Logs for complete treatment.       Assessment/Plan  Responded well to therapy today.  Pt was late to appointment so reps were reduced.    Progress per Plan of Care           Timed:         Manual Therapy:    10     mins  47905;     Therapeutic Exercise:    20     mins  05077;      Therapeutic Activity:     10     mins  59623;         Timed Treatment:   40   mins   Total Treatment:     40   mins        Librado Grant PTA  Physical Therapist Assistant       no complications no complications

## 2023-01-11 ENCOUNTER — OFFICE VISIT (OUTPATIENT)
Dept: PAIN MEDICINE | Facility: CLINIC | Age: 63
End: 2023-01-11
Payer: COMMERCIAL

## 2023-01-11 VITALS
HEART RATE: 93 BPM | RESPIRATION RATE: 16 BRPM | SYSTOLIC BLOOD PRESSURE: 178 MMHG | OXYGEN SATURATION: 96 % | DIASTOLIC BLOOD PRESSURE: 83 MMHG

## 2023-01-11 DIAGNOSIS — M53.3 SACROILIAC JOINT DYSFUNCTION: Primary | ICD-10-CM

## 2023-01-11 DIAGNOSIS — G89.4 CHRONIC PAIN SYNDROME: ICD-10-CM

## 2023-01-11 PROCEDURE — 99214 OFFICE O/P EST MOD 30 MIN: CPT | Performed by: STUDENT IN AN ORGANIZED HEALTH CARE EDUCATION/TRAINING PROGRAM

## 2023-01-11 NOTE — PROGRESS NOTES
Subjective   Sindy Martin is a 62 y.o. female is here with new complaint of R sided lower back pain. She was previously seen about 6 months ago for upper and mid back pain. She had RIGHT SI joint issue for long time and was improved when she had CT guided R SI joint injection in past for several months.    On last visit:     Lower back/R buttock pain is 3/10 on VAS, at maximum is 4/10. Pain is aching and dull in nature. Pain is not referred. The pain is intemrittent. The pain is improved by SI joint injection. The pain is worse with at night, going up and down the stairs.    Previous Injection:      R SI joint injection by IR - CT guided - excellent relief for 4 months.     Hx: Referred by Rubens Millard MD  to our pain management clinic for consultation, evaluation and treatment of upper and mid back pain.   Her pain has been intermittently present for several years but starting pain continues since 4/26/2022.  It was initially improved with gabapentin in March 2021 and then she had another severe episode in April 2022 but has been much better since taking Lyrica.  Denies any significant injuries but states that she went for upper EUS and they had her laying on her left side and after she got up from EUS she started having throbbing in her left elbow.  She has tried Aleve, Lyrica, prednisone lidocaine patches with some relief.  Denies any rash or history of shingles.  History of breast cancer diagnosed in 2014.  She has been also prescribed capsicin cream and was started on Percocet at nighttime to help.  She has been also referred to neurology Dr. Clement Lang.        Left shoulder blade pain is 1/10 on VAS, at maximum is /10. Pain is throbbing in nature. Denies any numbness, tingling or burning. Pain is not referred. The pain is intermittent. The pain is improved by Lyrica. The pain is worse with nothing in specific. She has some triceps and left arm tingling. Hx of neck issues and has used traction  previously. No history of shingles or rash around that area. Last severe episode was 1 month ago. Denies any neck or shoulder pain.      PHQ-9- 3  SOAPP- 4     PMH:   DM-2, history of breast cancer s/p lumpectomy and chemo, obesity, hypertension, GERD, rotator cuff syndrome, s/p left carpal tunnel release  2017, R shoulder subacromial bursitis- improved with injection from Dr. Cali.      Current Medications:   Meloxicam 15 mg  Lyrica 150 mg daily (200 mg shaking, memory issues), confusion).   Percocet 5 mg nightly  Bupropion  Lexapro  Magnesium           Past Medications:  Gabapentin - made her shaky with higher dose      Past Modalities:  TENS:                                                                          yes                                                 Physical Therapy Within The Last 6 Months              no  Psychotherapy                                                            no  Massage Therapy                                                       no     Patient Complains Of:  Uro-Fecal Incontinence          no  Weight Gain/Loss                   no  Fever/Chills                             no  Weakness                               no              Current Outpatient Medications:   •  ALOE VERA JUICE PO, Take 2 oz by mouth 2 (Two) Times a Day., Disp: , Rfl:   •  aspirin 81 MG tablet, Take 81 mg by mouth Daily., Disp: , Rfl:   •  atorvastatin (LIPITOR) 20 MG tablet, TAKE 1 TABLET DAILY, Disp: 90 tablet, Rfl: 1  •  Biotin 5 MG capsule, Take  by mouth., Disp: , Rfl:   •  buPROPion XL (WELLBUTRIN XL) 150 MG 24 hr tablet, Take 150 mg by mouth Every Morning., Disp: , Rfl:   •  escitalopram (LEXAPRO) 20 MG tablet, Take 20 mg by mouth Daily., Disp: , Rfl:   •  exemestane (AROMASIN) 25 MG chemo tablet, TAKE 1 TABLET DAILY, Disp: 90 tablet, Rfl: 3  •  loratadine (CLARITIN) 10 MG tablet, Take 10 mg by mouth Daily., Disp: , Rfl:   •  Magnesium 250 MG tablet, Take 500 mg by mouth Daily., Disp: , Rfl:    •  Melatonin 3 MG capsule, Take  by mouth., Disp: , Rfl:   •  meloxicam (MOBIC) 15 MG tablet, TAKE 1 TABLET BY MOUTH DAILY, Disp: 90 tablet, Rfl: 0  •  metFORMIN (GLUCOPHAGE) 500 MG tablet, TAKE 2 TABLETS TWICE A DAY, Disp: 360 tablet, Rfl: 3  •  multivitamin with minerals tablet tablet, Take 1 tablet by mouth Daily., Disp: , Rfl:   •  NON FORMULARY, OSTEOBIFLEX 1 TAB DAILY, Disp: , Rfl:   •  pantoprazole (PROTONIX) 40 MG EC tablet, Take 40 mg by mouth Daily., Disp: , Rfl:   •  polycarbophil 625 MG tablet tablet, Take 1,250 mg by mouth Daily., Disp: , Rfl:   •  Probiotic Product (PROBIOTIC BLEND PO), Take 1 tablet by mouth Daily., Disp: , Rfl:   •  Synthroid 50 MCG tablet, TAKE 1 TABLET DAILY, Disp: 90 tablet, Rfl: 1  •  temazepam (Restoril) 15 MG capsule, Take 1-2 capsules by mouth At Night As Needed for Sleep for up to 30 days., Disp: 40 capsule, Rfl: 1    The following portions of the patient's history were reviewed and updated as appropriate: allergies, current medications, past family history, past medical history, past social history, past surgical history, and problem list.      REVIEW OF PERTINENT MEDICAL DATA    Past Medical History:   Diagnosis Date   • Allergic     Just seasonal allergies.   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     Past Surgical History:   Procedure Laterality Date   • APPENDECTOMY     • BREAST BIOPSY     • BREAST LUMPECTOMY     • CARPAL TUNNEL RELEASE     • CHOLECYSTECTOMY     • COLONOSCOPY  2018 (?)   • HYSTERECTOMY     • LYMPH NODE BIOPSY  2014    Bronson node under left arm   • OOPHORECTOMY     • UPPER ENDOSCOPIC ULTRASOUND W/ FNA N/A  04/28/2022    Procedure: EUS WITH BIOPSY;  Surgeon: Roldan Mayer MD;  Location: [facility] ENDOSCOPY;  Service: Gastroenterology;  Laterality: N/A;  HIATEL HERNIA, GASTRIC POLYP     Family History   Problem Relation Age of Onset   • Arthritis Mother    • Gout Mother    • Parkinsonism Mother    • Miscarriages / Stillbirths Mother         Miscarriage of second child   • Lung cancer Father    • Arthritis Father    • Cancer Father    • Depression Father    • Mental illness Father    • Lung cancer Maternal Aunt    • Cancer Maternal Aunt    • Uterine cancer Paternal Aunt 60   • Cancer Paternal Aunt    • Prostate cancer Paternal Uncle    • Cancer Paternal Uncle    • Melanoma Paternal Grandmother    • Cancer Paternal Grandmother    • Depression Paternal Grandmother    • Liver disease Paternal Grandmother    • Mental illness Paternal Grandmother    • Prostate cancer Paternal Cousin    • Melanoma Paternal Cousin    • Esophageal cancer Paternal Cousin    • Thyroid cancer Maternal Cousin 40   • Esophageal cancer Maternal Cousin    • Ovarian cancer Maternal Cousin 50   • Heart disease Maternal Grandfather    • Diabetes Maternal Grandmother    • Alcohol abuse Maternal Uncle    • COPD Maternal Uncle    • Heart disease Maternal Uncle    • Heart disease Paternal Uncle    • Liver disease Maternal Uncle      Social History     Socioeconomic History   • Marital status:    Tobacco Use   • Smoking status: Never   • Smokeless tobacco: Never   Vaping Use   • Vaping Use: Never used   Substance and Sexual Activity   • Alcohol use: Not Currently     Comment: Very sporadically.  Not even once a month.  A glass of wine   • Drug use: Never   • Sexual activity: Not Currently     Partners: Male     Birth control/protection: Post-menopausal     Comment: Had total hysterectomy - 2015         Review of Systems      Vitals:    01/11/23 0944   BP: 178/83   Pulse: 93   Resp: 16   SpO2: 96%   PainSc:   3         Objective   Physical  Exam  Musculoskeletal:         General: Tenderness present.        Legs:            Imaging Reviewed:  NM bone scan whole body-5/23/2022  No evidence of osseous metastatic disease.         Assessment:    1. Sacroiliac joint dysfunction    2. Chronic pain syndrome       Plan:   1. Patient had excellent relief with previous SI joint injection on R. Pain is returning now.  Patient has pain in the lower back, right SI joint tenderness was positive. Kamila test, SI joint compression and thigh thurst test were performed and were positive for SI joint pathology. Discussed right SI joint injection. Risk includes infection, bleeding, nerve damage, headache, paraplegia. Patient understands and agrees with the plan.     RTC for injection and then 3 weeks follow up.     Aguila Tompkins DO  Pain Management   [facility]              INSPECT REPORT    As part of the patient's treatment plan, I may be prescribing controlled substances. The patient has been made aware of appropriate use of such medications, including potential risk of somnolence, limited ability to drive and/or work safely, and the potential for dependence or overdose. It has also been made clear that these medications are for use by this patient only, without concomitant use of alcohol or other substances unless prescribed.     Patient has completed prescribing agreement detailing terms of continued prescribing of controlled substances, including monitoring INSPECT reports, urine drug screening, and pill counts if necessary. The patient is aware that inappropriate use will results in cessation of prescribing such medications.    INSPECT report has been reviewed and scanned into the patient's chart.

## 2023-01-12 ENCOUNTER — TREATMENT (OUTPATIENT)
Dept: PHYSICAL THERAPY | Facility: CLINIC | Age: 63
End: 2023-01-12
Payer: COMMERCIAL

## 2023-01-12 DIAGNOSIS — M25.561 BILATERAL CHRONIC KNEE PAIN: Primary | ICD-10-CM

## 2023-01-12 DIAGNOSIS — G89.29 BILATERAL CHRONIC KNEE PAIN: Primary | ICD-10-CM

## 2023-01-12 DIAGNOSIS — M25.562 BILATERAL CHRONIC KNEE PAIN: Primary | ICD-10-CM

## 2023-01-12 DIAGNOSIS — G89.29 CHRONIC LEFT SHOULDER PAIN: ICD-10-CM

## 2023-01-12 DIAGNOSIS — M25.512 CHRONIC LEFT SHOULDER PAIN: ICD-10-CM

## 2023-01-12 PROCEDURE — 97530 THERAPEUTIC ACTIVITIES: CPT | Performed by: PHYSICAL THERAPIST

## 2023-01-12 PROCEDURE — 97140 MANUAL THERAPY 1/> REGIONS: CPT | Performed by: PHYSICAL THERAPIST

## 2023-01-12 PROCEDURE — 97110 THERAPEUTIC EXERCISES: CPT | Performed by: PHYSICAL THERAPIST

## 2023-01-12 NOTE — PROGRESS NOTES
Physical Therapy Daily Treatment Note  Visit: 14    Sindy Martin reports: feeling better overall both with her left shoulder and knee pain.  Reports has continued with walking program and light exercise at [facility] in addition to PT visits.   YOB: 1960  Referring practitioner : Rubens Cali MD  Date of Initial: Type: THERAPY  Noted: 11/29/2022  Today's date: 1/12/2023  Patient seen for 14 sessions    Visit Diagnoses:    ICD-10-CM ICD-9-CM   1. Bilateral chronic knee pain  M25.561 719.46    M25.562 338.29    G89.29    2. Chronic left shoulder pain  M25.512 719.41    G89.29 338.29       Subjective       Objective   See Exercise, Manual, and Modality Logs for complete treatment.       Assessment/Plan               Timed:         Manual Therapy:    12     mins  67373;     Therapeutic Exercise:    21     mins  32560;       Therapeutic Activity:     10     mins  30034;           Timed Treatment:   43   mins   Total Treatment:     43   mins         Mert Baron PT, DPT  Physical Therapist  [place] Lic #[id number]

## 2023-01-16 ENCOUNTER — TREATMENT (OUTPATIENT)
Dept: PHYSICAL THERAPY | Facility: CLINIC | Age: 63
End: 2023-01-16
Payer: COMMERCIAL

## 2023-01-16 DIAGNOSIS — G89.29 CHRONIC LEFT SHOULDER PAIN: ICD-10-CM

## 2023-01-16 DIAGNOSIS — M25.561 BILATERAL CHRONIC KNEE PAIN: Primary | ICD-10-CM

## 2023-01-16 DIAGNOSIS — M25.512 CHRONIC LEFT SHOULDER PAIN: ICD-10-CM

## 2023-01-16 DIAGNOSIS — G89.29 BILATERAL CHRONIC KNEE PAIN: Primary | ICD-10-CM

## 2023-01-16 DIAGNOSIS — M25.562 BILATERAL CHRONIC KNEE PAIN: Primary | ICD-10-CM

## 2023-01-16 PROCEDURE — 97110 THERAPEUTIC EXERCISES: CPT | Performed by: PHYSICAL THERAPIST

## 2023-01-16 PROCEDURE — 97112 NEUROMUSCULAR REEDUCATION: CPT | Performed by: PHYSICAL THERAPIST

## 2023-01-16 PROCEDURE — 97140 MANUAL THERAPY 1/> REGIONS: CPT | Performed by: PHYSICAL THERAPIST

## 2023-01-16 NOTE — PROGRESS NOTES
Physical Therapy Daily Treatment Note  Visit: 15    Sindy Martin reports: has some R sided SI joint pain and scheduled for injection the day of her next PT appointment.  Reports feeling gradual progress in her L shoulder and knee strength and lower pain levels with activities.   YOB: 1960  Referring practitioner : Rubens Cali MD  Date of Initial: Type: THERAPY  Noted: 11/29/2022  Today's date: 1/16/2023  Patient seen for 15 sessions    Visit Diagnoses:    ICD-10-CM ICD-9-CM   1. Bilateral chronic knee pain  M25.561 719.46    M25.562 338.29    G89.29    2. Chronic left shoulder pain  M25.512 719.41    G89.29 338.29       Subjective       Objective   See Exercise, Manual, and Modality Logs for complete treatment.       Assessment/Plan     Steady progression in L UE and B LE strength/ROM/function with decreasing pain.                Timed:         Manual Therapy:    11     mins  63570;     Therapeutic Exercise:    21     mins  12700;     Neuromuscular Corey:    12    mins  00049;        Timed Treatment:   44   mins   Total Treatment:     44   mins         Mert Baron PT, DPT  Physical Therapist  [place] Lic #[id number]

## 2023-01-19 ENCOUNTER — HOSPITAL ENCOUNTER (OUTPATIENT)
Dept: PAIN MEDICINE | Facility: HOSPITAL | Age: 63
Discharge: HOME | End: 2023-01-19
Payer: COMMERCIAL

## 2023-01-19 VITALS
TEMPERATURE: 97.1 F | BODY MASS INDEX: 42.21 KG/M2 | OXYGEN SATURATION: 96 % | HEART RATE: 88 BPM | DIASTOLIC BLOOD PRESSURE: 95 MMHG | WEIGHT: 215 LBS | HEIGHT: 60 IN | RESPIRATION RATE: 16 BRPM | SYSTOLIC BLOOD PRESSURE: 158 MMHG

## 2023-01-19 DIAGNOSIS — M53.3 SACROILIAC JOINT DYSFUNCTION: ICD-10-CM

## 2023-01-19 DIAGNOSIS — R52 PAIN: ICD-10-CM

## 2023-01-19 PROCEDURE — 27096 INJECT SACROILIAC JOINT: CPT | Performed by: STUDENT IN AN ORGANIZED HEALTH CARE EDUCATION/TRAINING PROGRAM

## 2023-01-19 PROCEDURE — 77003 FLUOROGUIDE FOR SPINE INJECT: CPT

## 2023-01-19 PROCEDURE — 25010000002 METHYLPREDNISOLONE PER 40 MG: Performed by: STUDENT IN AN ORGANIZED HEALTH CARE EDUCATION/TRAINING PROGRAM

## 2023-01-19 RX ORDER — METHYLPREDNISOLONE ACETATE 40 MG/ML
40 INJECTION, SUSPENSION INTRA-ARTICULAR; INTRALESIONAL; INTRAMUSCULAR; SOFT TISSUE ONCE
Status: COMPLETED | OUTPATIENT
Start: 2023-01-19 | End: 2023-01-19

## 2023-01-19 RX ORDER — BUPIVACAINE HYDROCHLORIDE 2.5 MG/ML
10 INJECTION, SOLUTION EPIDURAL; INFILTRATION; INTRACAUDAL ONCE
Status: COMPLETED | OUTPATIENT
Start: 2023-01-19 | End: 2023-01-19

## 2023-01-19 RX ORDER — COVID-19 MOLECULAR TEST ASSAY
KIT MISCELLANEOUS
COMMUNITY
Start: 2023-01-17 | End: 2023-02-15

## 2023-01-19 RX ADMIN — BUPIVACAINE HYDROCHLORIDE 6 ML: 2.5 INJECTION, SOLUTION EPIDURAL; INFILTRATION; INTRACAUDAL; PERINEURAL at 09:50

## 2023-01-19 RX ADMIN — METHYLPREDNISOLONE ACETATE 40 MG: 40 INJECTION, SUSPENSION INTRA-ARTICULAR; INTRALESIONAL; INTRAMUSCULAR; INTRASYNOVIAL; SOFT TISSUE at 09:50

## 2023-01-19 NOTE — PROCEDURES
RIGHT SI Joint Arthropathy      POSTOPERATIVE DIAGNOSIS:  Same.     PROCEDURE: RIGHT sacroiliac joint steroid injection     PROCEDURE IN DETAIL:  The patient was placed in a prone position and the lower back was prepped with chloraprep and draped in the usual sterile fashion.  The skin overlaying the RIGHT SI joint was infiltrated with 1% lidocaine for local anesthesia.  A 22-gauge 3.5 inch spinal needle was inserted through the skin under fluoroscopic guidance until we got to the lower third of the SI joint. Another needle was placed in the upper third of the joint. 2 cc of 0.25% marcaine with depomedrol was injected at each level. A total of 4 cc of 0.25% marcaine with 40 mg of depo-medrol was injected.     After the procedure the needles were flushed with preservative free local anesthetic and removed. Skin was cleaned and a sterile dressing was applied.    Following the procedure the patient's vital signs were stable. The patient was discharged home in good condition after being given discharge instructions.    COMPLICATIONS: None    Aguila Tompkins DO  Pain Management   [facility]

## 2023-01-19 NOTE — H&P
H and P reviewed from previous visit and no changes to patient's clinical presentation. Will proceed with procedure as planned. Patient denies history of DM and being on blood thinners.    Aguila Tompkins DO  Pain Management   [facility]

## 2023-01-20 ENCOUNTER — TELEPHONE (OUTPATIENT)
Dept: PAIN MEDICINE | Facility: HOSPITAL | Age: 63
End: 2023-01-20
Payer: COMMERCIAL

## 2023-01-20 NOTE — TELEPHONE ENCOUNTER
1/20/23-- Dr Tompkins-- pt called back-- said she is doing good after procedure-- told her if any problems  Please call us back/darron

## 2023-01-23 DIAGNOSIS — M25.562 CHRONIC PAIN OF BOTH KNEES: ICD-10-CM

## 2023-01-23 DIAGNOSIS — G89.29 CHRONIC PAIN OF BOTH KNEES: ICD-10-CM

## 2023-01-23 DIAGNOSIS — M46.1 SACROILIITIS: ICD-10-CM

## 2023-01-23 DIAGNOSIS — M25.561 CHRONIC PAIN OF BOTH KNEES: ICD-10-CM

## 2023-01-23 DIAGNOSIS — M75.52 BURSITIS OF LEFT SHOULDER: ICD-10-CM

## 2023-01-23 RX ORDER — TEMAZEPAM 30 MG/1
30 CAPSULE ORAL NIGHTLY PRN
Qty: 30 CAPSULE | Refills: 1 | Status: SHIPPED | OUTPATIENT
Start: 2023-01-23 | End: 2023-02-22

## 2023-01-24 ENCOUNTER — TREATMENT (OUTPATIENT)
Dept: PHYSICAL THERAPY | Facility: CLINIC | Age: 63
End: 2023-01-24
Payer: COMMERCIAL

## 2023-01-24 DIAGNOSIS — M25.562 BILATERAL CHRONIC KNEE PAIN: Primary | ICD-10-CM

## 2023-01-24 DIAGNOSIS — G89.29 CHRONIC LEFT SHOULDER PAIN: ICD-10-CM

## 2023-01-24 DIAGNOSIS — M25.561 BILATERAL CHRONIC KNEE PAIN: Primary | ICD-10-CM

## 2023-01-24 DIAGNOSIS — G89.29 BILATERAL CHRONIC KNEE PAIN: Primary | ICD-10-CM

## 2023-01-24 DIAGNOSIS — M25.512 CHRONIC LEFT SHOULDER PAIN: ICD-10-CM

## 2023-01-24 PROCEDURE — 97140 MANUAL THERAPY 1/> REGIONS: CPT | Performed by: PHYSICAL THERAPIST

## 2023-01-24 PROCEDURE — 97110 THERAPEUTIC EXERCISES: CPT | Performed by: PHYSICAL THERAPIST

## 2023-01-24 NOTE — PROGRESS NOTES
Physical Therapy Daily Treatment Note  Visit: 16    Sindy Martin reports: doing well overall and pleased with improvement in her L shoulder and both knees since beginning PT.   YOB: 1960  Referring practitioner : Rubens Cali MD  Date of Initial: Type: THERAPY  Noted: 11/29/2022  Today's date: 1/24/2023  Patient seen for 16 sessions    Visit Diagnoses:    ICD-10-CM ICD-9-CM   1. Bilateral chronic knee pain  M25.561 719.46    M25.562 338.29    G89.29    2. Chronic left shoulder pain  M25.512 719.41    G89.29 338.29       Subjective       Objective   See Exercise, Manual, and Modality Logs for complete treatment.       Assessment/Plan     Has met or progressed towards all LTG with plan to trial HEP only after next visit.     Plan:    Continue current             Timed:         Manual Therapy:    9     mins  98636;     Therapeutic Exercise:    33     mins  73777;         Timed Treatment:   42   mins   Total Treatment:     42   mins         Mert Baron PT, DPT  Physical Therapist  [place] Lic #[id number]

## 2023-01-26 ENCOUNTER — TREATMENT (OUTPATIENT)
Dept: PHYSICAL THERAPY | Facility: CLINIC | Age: 63
End: 2023-01-26
Payer: COMMERCIAL

## 2023-01-26 DIAGNOSIS — M25.512 CHRONIC LEFT SHOULDER PAIN: ICD-10-CM

## 2023-01-26 DIAGNOSIS — G89.29 BILATERAL CHRONIC KNEE PAIN: Primary | ICD-10-CM

## 2023-01-26 DIAGNOSIS — G89.29 CHRONIC LEFT SHOULDER PAIN: ICD-10-CM

## 2023-01-26 DIAGNOSIS — M25.562 BILATERAL CHRONIC KNEE PAIN: Primary | ICD-10-CM

## 2023-01-26 DIAGNOSIS — M25.561 BILATERAL CHRONIC KNEE PAIN: Primary | ICD-10-CM

## 2023-01-26 PROCEDURE — 97110 THERAPEUTIC EXERCISES: CPT | Performed by: PHYSICAL THERAPIST

## 2023-01-26 PROCEDURE — 97112 NEUROMUSCULAR REEDUCATION: CPT | Performed by: PHYSICAL THERAPIST

## 2023-01-26 NOTE — PROGRESS NOTES
Physical Therapy Daily Treatment Note  Visit: 17    Sindy Martin reports: doing well overall and agrees likely ready to try HEP only after today.   YOB: 1960  Referring practitioner : Rubens Cali MD  Date of Initial: Type: THERAPY  Noted: 2022  Today's date: 2023  Patient seen for 17 sessions    Visit Diagnoses:    ICD-10-CM ICD-9-CM   1. Bilateral chronic knee pain  M25.561 719.46    M25.562 338.29    G89.29    2. Chronic left shoulder pain  M25.512 719.41    G89.29 338.29       Subjective       Objective   ST. Bilateral knee FL AROM improved 5-8 degrees or greater over 2 weeks. - Met  2. L shoulder FL/ER AROM improved 15 degrees each or greater over 2 weeks. - Met  3. Independent and compliant with HEP over 2 weeks. - Met    LT. Knee Outcome Survey score improved to 95% or greater over 8 weeks. - Progressed towards   2. Quick DASH for ADL's to stay at or below 15% or less over 8 weeks with resumption of prior activity. - Met  3. L shoulder strength all planes 5/5 to all return to prior level of activity over 8 weeks. - Met  4. Bilateral knee pain 2/10 or less with sleeping with 50% or greater decrease in walking up during sleep per pain. - Met    See Exercise, Manual, and Modality Logs for complete treatment.       Assessment/Plan     Has progressed towards or met all STG/LTG and good compliance and understanding of HEP. Reviewed activities and machines to continue with HEP at [facility] with plan to trial HEP only.                Timed:         Manual Therapy:    3     mins  65767;     Therapeutic Exercise:    30     mins  69440;     Neuromuscular Corey:    9    mins  50902;          Timed Treatment:   42   mins   Total Treatment:     42   mins         Mert Baron PT, DPT  Physical Therapist  [place] Lic #[id number]

## 2023-02-02 NOTE — PROGRESS NOTES
Subjective   Sindy Martin is a 62 y.o. female is here for follow up for lower back pain/right buttock pain. Patient was last seen on 1/19/2023 for R SI joint injection with 100% pain relief. L shoulder blade pain is much improved with PT.    On last visit:     R buttock pain is 0/10 on VAS today.     Before injection:   Lower back/R buttock pain is 3/10 on VAS, at maximum is 4/10. Pain is aching and dull in nature. Pain is not referred. The pain is intemrittent. The pain is improved by SI joint injection. The pain is worse with at night, going up and down the stairs.    Left shoulder blade pain is 1/10 on VAS, at maximum is /10. Pain is throbbing in nature. Denies any numbness, tingling or burning. Pain is not referred. The pain is intermittent. The pain is improved by Lyrica. The pain is worse with nothing in specific. She has some triceps and left arm tingling. Hx of neck issues and has used traction previously. No history of shingles or rash around that area. Last severe episode was 1 month ago. Denies any neck or shoulder pain.     Previous Injection:   1/19/2023-right SI joint injection- 100% pain relief. Pain doesn't wake her up at night time. + Functional improvement.    R SI joint injection by IR - CT guided - excellent relief for 4 months.   Hx: Referred by Rubens Millard MD  to our pain management clinic for consultation, evaluation and treatment of upper and mid back pain.   Her pain has been intermittently present for several years but starting pain continues since 4/26/2022.  It was initially improved with gabapentin in March 2021 and then she had another severe episode in April 2022 but has been much better since taking Lyrica.  Denies any significant injuries but states that she went for upper EUS and they had her laying on her left side and after she got up from EUS she started having throbbing in her left elbow.  She has tried Aleve, Lyrica, prednisone lidocaine patches with some  relief.  Denies any rash or history of shingles.  History of breast cancer diagnosed in 2014.  She has been also prescribed capsicin cream and was started on Percocet at nighttime to help.  She has been also referred to neurology Dr. Clement Lang.     PHQ-9- 3  SOAPP- 4     PMH:   DM-2, history of breast cancer s/p lumpectomy and chemo, obesity, hypertension, GERD, rotator cuff syndrome, s/p left carpal tunnel release  2017, R shoulder subacromial bursitis- improved with injection from Dr. Cali.      Current Medications:   Meloxicam 15 mg  Lyrica 150 mg daily (200 mg shaking, memory issues), confusion).   Percocet 5 mg nightly  Bupropion  Lexapro  Magnesium           Past Medications:  Gabapentin - made her shaky with higher dose      Past Modalities:  TENS:                                                                          yes                                                 Physical Therapy Within The Last 6 Months              Yes - 1/2023  Psychotherapy                                                            no  Massage Therapy                                                       no     Patient Complains Of:  Uro-Fecal Incontinence          no  Weight Gain/Loss                   no  Fever/Chills                             no  Weakness                               no            Current Outpatient Medications:   •  ALOE VERA JUICE PO, Take 2 oz by mouth 2 (Two) Times a Day., Disp: , Rfl:   •  aspirin 81 MG tablet, Take 81 mg by mouth Daily., Disp: , Rfl:   •  atorvastatin (LIPITOR) 20 MG tablet, TAKE 1 TABLET DAILY, Disp: 90 tablet, Rfl: 1  •  BinaxNOW COVID-19 Ag Home Test kit, TEST AS DIRECTED TODAY, Disp: , Rfl:   •  Biotin 5 MG capsule, Take  by mouth., Disp: , Rfl:   •  buPROPion XL (WELLBUTRIN XL) 150 MG 24 hr tablet, Take 150 mg by mouth Every Morning., Disp: , Rfl:   •  escitalopram (LEXAPRO) 20 MG tablet, Take 20 mg by mouth Daily., Disp: , Rfl:   •  exemestane (AROMASIN) 25 MG chemo tablet,  TAKE 1 TABLET DAILY, Disp: 90 tablet, Rfl: 3  •  loratadine (CLARITIN) 10 MG tablet, Take 10 mg by mouth Daily., Disp: , Rfl:   •  Magnesium 250 MG tablet, Take 500 mg by mouth Daily., Disp: , Rfl:   •  Melatonin 3 MG capsule, Take  by mouth., Disp: , Rfl:   •  meloxicam (MOBIC) 15 MG tablet, TAKE 1 TABLET BY MOUTH DAILY, Disp: 90 tablet, Rfl: 0  •  metFORMIN (GLUCOPHAGE) 500 MG tablet, TAKE 2 TABLETS TWICE A DAY, Disp: 360 tablet, Rfl: 3  •  multivitamin with minerals tablet tablet, Take 1 tablet by mouth Daily., Disp: , Rfl:   •  NON FORMULARY, OSTEOBIFLEX 1 TAB DAILY, Disp: , Rfl:   •  pantoprazole (PROTONIX) 40 MG EC tablet, Take 40 mg by mouth Daily., Disp: , Rfl:   •  polycarbophil 625 MG tablet tablet, Take 1,250 mg by mouth Daily., Disp: , Rfl:   •  Probiotic Product (PROBIOTIC BLEND PO), Take 1 tablet by mouth Daily., Disp: , Rfl:   •  Synthroid 50 MCG tablet, TAKE 1 TABLET DAILY, Disp: 90 tablet, Rfl: 1  •  temazepam (Restoril) 30 MG capsule, Take 1 capsule by mouth At Night As Needed for Sleep or Anxiety for up to 30 days., Disp: 30 capsule, Rfl: 1    The following portions of the patient's history were reviewed and updated as appropriate: allergies, current medications, past family history, past medical history, past social history, past surgical history, and problem list.      REVIEW OF PERTINENT MEDICAL DATA    Past Medical History:   Diagnosis Date   • Allergic     Just seasonal allergies.   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     Past Surgical History:   Procedure Laterality Date   • APPENDECTOMY     • BREAST BIOPSY     • BREAST LUMPECTOMY     • CARPAL TUNNEL  RELEASE     • CHOLECYSTECTOMY     • COLONOSCOPY  2018 (?)   • HYSTERECTOMY     • LYMPH NODE BIOPSY  2014    Romeo node under left arm   • OOPHORECTOMY     • UPPER ENDOSCOPIC ULTRASOUND W/ FNA N/A 04/28/2022    Procedure: EUS WITH BIOPSY;  Surgeon: Roldan Mayer MD;  Location: [facility] ENDOSCOPY;  Service: Gastroenterology;  Laterality: N/A;  HIATEL HERNIA, GASTRIC POLYP     Family History   Problem Relation Age of Onset   • Arthritis Mother    • Gout Mother    • Parkinsonism Mother    • Miscarriages / Stillbirths Mother         Miscarriage of second child   • Lung cancer Father    • Arthritis Father    • Cancer Father    • Depression Father    • Mental illness Father    • Lung cancer Maternal Aunt    • Cancer Maternal Aunt    • Uterine cancer Paternal Aunt 60   • Cancer Paternal Aunt    • Prostate cancer Paternal Uncle    • Cancer Paternal Uncle    • Melanoma Paternal Grandmother    • Cancer Paternal Grandmother    • Depression Paternal Grandmother    • Liver disease Paternal Grandmother    • Mental illness Paternal Grandmother    • Prostate cancer Paternal Cousin    • Melanoma Paternal Cousin    • Esophageal cancer Paternal Cousin    • Thyroid cancer Maternal Cousin 40   • Esophageal cancer Maternal Cousin    • Ovarian cancer Maternal Cousin 50   • Heart disease Maternal Grandfather    • Diabetes Maternal Grandmother    • Alcohol abuse Maternal Uncle    • COPD Maternal Uncle    • Heart disease Maternal Uncle    • Heart disease Paternal Uncle    • Liver disease Maternal Uncle      Social History     Socioeconomic History   • Marital status:    Tobacco Use   • Smoking status: Never   • Smokeless tobacco: Never   Vaping Use   • Vaping Use: Never used   Substance and Sexual Activity   • Alcohol use: Not Currently     Comment: Very sporadically.  Not even once a month.  A glass of wine   • Drug use: Never   • Sexual activity: Not Currently     Partners: Male     Birth control/protection: Post-menopausal,  Hysterectomy     Comment: Had total hysterectomy - 2015         Review of Systems   Musculoskeletal: Positive for arthralgias and back pain.         Vitals:    02/08/23 0953   BP: 156/82   Pulse: 84   Resp: 16   SpO2: 97%   PainSc: 0-No pain         Objective   Physical Exam  Musculoskeletal:         General: Tenderness present.        Legs:            Imaging Reviewed:  NM bone scan whole body-5/23/2022  No evidence of osseous metastatic disease.      Assessment:    1. Sacroiliac joint dysfunction    2. Trigger point with neuralgia    3. Chronic pain syndrome           Plan:   1. Excellent relief with R SI joint injection. Will repeat in future as needed.   2. Excellent relief with shoulder pain with PT. Recommend continuing home exercises.      RTC PRN.     Aguila Tompkins DO  Pain Management   [facility]          INSPECT REPORT    As part of the patient's treatment plan, I may be prescribing controlled substances. The patient has been made aware of appropriate use of such medications, including potential risk of somnolence, limited ability to drive and/or work safely, and the potential for dependence or overdose. It has also been made clear that these medications are for use by this patient only, without concomitant use of alcohol or other substances unless prescribed.     Patient has completed prescribing agreement detailing terms of continued prescribing of controlled substances, including monitoring INSPECT reports, urine drug screening, and pill counts if necessary. The patient is aware that inappropriate use will results in cessation of prescribing such medications.    INSPECT report has been reviewed and scanned into the patient's chart.

## 2023-02-08 ENCOUNTER — OFFICE VISIT (OUTPATIENT)
Dept: PAIN MEDICINE | Facility: CLINIC | Age: 63
End: 2023-02-08
Payer: COMMERCIAL

## 2023-02-08 VITALS
SYSTOLIC BLOOD PRESSURE: 156 MMHG | OXYGEN SATURATION: 97 % | DIASTOLIC BLOOD PRESSURE: 82 MMHG | RESPIRATION RATE: 16 BRPM | HEART RATE: 84 BPM

## 2023-02-08 DIAGNOSIS — M53.3 SACROILIAC JOINT DYSFUNCTION: Primary | ICD-10-CM

## 2023-02-08 DIAGNOSIS — G89.4 CHRONIC PAIN SYNDROME: ICD-10-CM

## 2023-02-08 DIAGNOSIS — M79.2 TRIGGER POINT WITH NEURALGIA: ICD-10-CM

## 2023-02-08 PROCEDURE — 99213 OFFICE O/P EST LOW 20 MIN: CPT | Performed by: STUDENT IN AN ORGANIZED HEALTH CARE EDUCATION/TRAINING PROGRAM

## 2023-02-15 ENCOUNTER — OFFICE VISIT (OUTPATIENT)
Dept: FAMILY MEDICINE CLINIC | Facility: CLINIC | Age: 63
End: 2023-02-15
Payer: COMMERCIAL

## 2023-02-15 VITALS
WEIGHT: 216 LBS | SYSTOLIC BLOOD PRESSURE: 120 MMHG | HEART RATE: 105 BPM | HEIGHT: 60 IN | DIASTOLIC BLOOD PRESSURE: 80 MMHG | BODY MASS INDEX: 42.41 KG/M2 | RESPIRATION RATE: 16 BRPM | OXYGEN SATURATION: 95 %

## 2023-02-15 DIAGNOSIS — H90.3 SENSORINEURAL HEARING LOSS (SNHL) OF BOTH EARS: ICD-10-CM

## 2023-02-15 DIAGNOSIS — E66.01 MORBID (SEVERE) OBESITY DUE TO EXCESS CALORIES: ICD-10-CM

## 2023-02-15 DIAGNOSIS — C50.911 MALIGNANT NEOPLASM OF RIGHT FEMALE BREAST, UNSPECIFIED ESTROGEN RECEPTOR STATUS, UNSPECIFIED SITE OF BREAST: ICD-10-CM

## 2023-02-15 DIAGNOSIS — E03.9 ACQUIRED HYPOTHYROIDISM: ICD-10-CM

## 2023-02-15 DIAGNOSIS — Z00.00 PREVENTATIVE HEALTH CARE: Primary | ICD-10-CM

## 2023-02-15 DIAGNOSIS — I10 PRIMARY HYPERTENSION: ICD-10-CM

## 2023-02-15 DIAGNOSIS — E78.2 MIXED HYPERLIPIDEMIA: ICD-10-CM

## 2023-02-15 PROCEDURE — 99396 PREV VISIT EST AGE 40-64: CPT | Performed by: FAMILY MEDICINE

## 2023-02-15 NOTE — PROGRESS NOTES
Hematology/Oncology Outpatient Follow Up    PATIENT NAME:Sindy Martin  :1960  MRN: [id number]  PRIMARY CARE PHYSICIAN: Rubens Cali MD  REFERRING PHYSICIAN: No ref. provider found    Chief Complaint   Patient presents with   • Follow-up     Malignant neoplasm of right female breast, unspecified estrogen receptor status, unspecified site of breast (HCC)        HISTORY OF PRESENT ILLNESS:     This is a 62-year-old female who was originally seen on 14 after she presented with early stage right breast cancer.  Please refer to the details of my notes for more information.   • She had an abnormal mammogram in May 2014.    • 6/10/14 - She underwent ultrasound guided localization of the right breast mass with sentinel lymph node injection, right lumpectomy.  Pathology revealed one sentinel lymph node negative for malignancy.  Tumor was strongly positive for ER, VT and HER-2/hiro was negative.  Tumor measured 1 cm in greatest dimension.  It was a grade III disease.  All the margins were negative.  Pathologic stage is [id number].    • 14 - Patient’s tumor was sent for Oncotype DX assay.  This returned with a recurrent score and validation study, she has a 12% chance of distant recurrence despite five years of Tamoxifen over the next 10 years.  On the validation study her ER was positive, VT was positive and HER-2/hiro was negative.  • 14 - Patient had BRCA 1 and 2 mutational analysis which returned with BRCA 2 deleterious mutation involving [id number] on the BRCA 2 sequencing gene.    • 2014 - She had FSH and serum estradiol levels, which are noted to be in the postmenopausal range.    • 14 - Patient was initiated on adjuvant chemotherapy with Taxotere and Cytoxan along with Neulasta.     • 10/6/14 - Patient completed 4th cycle of chemotherapy with Cytoxan and Taxotere.   • Patient was seen by Dr. Buck who performed excisional biopsy of a mole on the left forearm and this was benign from  history.     • 11/17/14 - Patient completed whole right breast radiation under the direction of Dr. Mcgrath.  She received total dose of 4256 cGy.  • Patient has been seen by Dr. Son who will perform her complete hysterectomy due to BRCA positivity after the first of the year.      • 12/23/14 - Patient had a bone density, which was essentially normal.    • December 2014 - Patient was initiated on Arimidex 1 mg every day as well as Os-Quinton D.   • April 2015 - Patient underwent total robotic hysterectomy with bilateral adnexectomy.  Pathology was benign.  I have requested for official report of the above procedure and path reports.  Patient has been seen by Dr. Son on 4/29/15.    • 5/19/15 - Port catheter was removed.   • 5/28/15 - Bilateral diagnostic mammogram which was essentially unremarkable except that patient has heterogenously dense breasts.  • 6/22/15 - Patient was seen by Dr. Guo.  Follow up in one year was recommended.    • Fasting lipid profile done which showed total cholesterol of 234, HDL of 42, triglyceride 237, elevated, and LDH was 145, also elevated.  BUN 20, creatinine 0.87 and LFTs were essentially unremarkable.  WBC 6.7, hemoglobin 12.4 and platelet count 197,000.    • 12/5/15 - Fasting lipid profile showed total cholesterol 133, HDL 50 and LDL 67, triglyceride 81.  BUN 22, creatinine 0.7.  • 2/24/16 - Bilateral breast MRI which showed post-op scar change on the right breast, but no MR finding to suggest malignancy.  She has a 4 mm enhancing lesion in the lower right breast at the inframammary fold.  Patient’s breast exam and skin evaluation was essentially unremarkable at the site where the 4 mm enhancing lesion was noted.  Patient also had no specific complaints.  • 3/22/16 - Anemia work up with reticulocyte count (N), B12 (N) 521, ferritin (N) 43, folate 20, haptoglobin 184, , BUN 11, creatinine 11.8.  SPEP with immunofixation assay showed IgG kappa monoclonal protein with M  spike of 0.2 gm/dL.  Erythropoietin level (N) 22 [range 2-18].    • 3/31/16 - Quantitative immunoglobulins for IgA, IgG and IgM were normal.  Serum free light chains were essentially unremarkable.    • 4/5/16 - 24-hour urine electrophoresis which did not show any evidence of monoclonal gammopathy.   • 6/10/16 - Bilateral mammogram was essentially normal.  Follow up in one year was recommended.    • 6/20/16 - Serum transferrin receptor assay returned with high level at 5.2 [range 1.9-4.9].    • 6/27/16 - Urinalysis showed trace blood.  • 8/1/16 - Patient was seen by Dr. Torres and had EGD and colonoscopy.  EGD showed hiatal hernia, moderate to severe gastroduodenitis, but no ulcers seen.  No abnormalities seen on her colonoscopy.   • 9/12/16 - Chemistry panel:  BUN 14, creatinine 0.8, total protein (L) 5.8, alkaline phosphatase 94 [range 32-91].  Quantitative immunoglobulins for IgA (N) 101, IgG slightly low at 530 [range 600-1500], IgM (N) 195.  SPEP with immunofixation assay was essentially negative for any significant monoclonal protein.   • 9/19/16 - Urinalysis was negative for hematuria.   • 10/12/16 - Bone scan showed degenerative changes in the knees, otherwise negative.  No signs of metastatic disease.    • 10/10 & 10/17/16 - Patient received IV Injectafer.    • 1/16/17 - SPEP with immunofixation assay which did not show any evidence of monoclonal protein.   • 2/9/17 - Bilateral breast MRI showed no evidence of malignancy, but she had a skin lesion noted on the left breast.  Patient has since then seen Dr. Buck.  She has a follow up appointment in six months.    • 6/26/17 - Patient had bilateral diagnostic mammogram with tomosynthesis.  This revealed new linear pleomorphic microcalcifications in the medial right breast.  The left breast was unchanged.  Stereotactic biopsy was recommended.    • 7/12/17 - Patient underwent stereotactic biopsy of the right breast calcifications.  Pathology showed fibrous  mastopathy with coarse stromal microcalcifications suggestive of previous procedure.  Negative for carcinoma in situ and invasive carcinoma.  The result was concurrent with the image findings.  Follow up in one year was recommended.    • 9/11/17 - Since her last visit patient had labs including SPEP with immunofixation assay which showed a free lambda light chain identified.  IgA was 83.  IgG was 460.  IgM was 158.  BUN 17.  Creatinine 0.6.  Alkaline phosphatase 101.  Serum calcium 9.2.  CMP was normal.    • 9/16/17 - DEXA scan was normal.    • 2/15/18 - Bilateral breast MRI with stable post-op changes seen at the right breast, but no mass was seen in either the right or left breast.  Bilateral breast mammogram is due June 2018.   • 3/12/18 - CBC:  WBC 7.9, hemoglobin 13, platelet count 183,000.  Chemistry panel:  BUN 19.  Creatinine 0.7.  Alkaline phosphatase 92.  IgA 511.  IgG low at 518.  IgM normal 191.  Kappa lambda free light chains were normal.  SPEP with LAZARO was normal.    • 7/30/18 - Bilateral diagnostic mammogram with 3D with no mammographic evidence of malignancy.    • 9/18/18 - CT scan of the abdomen and pelvis which showed post-op changes, but no masses were identified in the abdomen.    • 2/4/19 - SPEP with LAZARO which did not show any monoclonal protein.  Total cholesterol was 116, triglycerides 69, LDL 66, HDL 43.  Chemistry:  BUN 17, creatinine 0.9, liver function tests are normal.    · 2/22/2019-patient had bilateral breast MRI, there was no MR evidence of malignancy in either breast.  · 8/29/19-patient had a DEXA scan, this was normal bone mineral density  · 8/29/2019-patient had bilateral mammogram which showed heterogeneously dense breast tissue.  Scar tissue was noted on the right breast consistent with previous surgery.  Follow-up in 1 year was recommended  · 2/19/2020 patient had bilateral breast MRI which was normal, follow-up in 1 year was recommended  · She is here today for routine  follow-up visit.  Patient does not have any breast specific complaints today  · March 2, 2021 patient had bilateral breast MRI which did not show any MR evidence of malignancy  · March 6, 2021 she had MRI of the abdomen which basically revealed mild fatty and atrophic pancreas no solid or cystic pancreatic mass was seen.  Patient to follow-up with Dr. Torres for EUS which would be due March 2022    Past Medical History:   Diagnosis Date   • Arthritis    • Depression    • Hearing loss    • Hypertension    • Hypothyroidism    • Low back pain        Past Surgical History:   Procedure Laterality Date   • APPENDECTOMY     • BREAST BIOPSY     • BREAST LUMPECTOMY     • CARPAL TUNNEL RELEASE     • CHOLECYSTECTOMY     • COLONOSCOPY  2018 (?)   • HYSTERECTOMY     • LYMPH NODE BIOPSY  2014    [place] node under left arm   • OOPHORECTOMY     • UPPER ENDOSCOPIC ULTRASOUND W/ FNA N/A 04/28/2022    Procedure: EUS WITH BIOPSY;  Surgeon: Roldan Mayer MD;  Location: [facility];  Service: Gastroenterology;  Laterality: N/A;  HIATEL HERNIA, GASTRIC POLYP         Current Outpatient Medications:   •  ALOE VERA JUICE PO, Take 2 oz by mouth 2 (Two) Times a Day., Disp: , Rfl:   •  aspirin 81 MG tablet, Take 81 mg by mouth Daily., Disp: , Rfl:   •  atorvastatin (LIPITOR) 20 MG tablet, TAKE 1 TABLET DAILY, Disp: 90 tablet, Rfl: 1  •  Biotin 5 MG capsule, Take  by mouth., Disp: , Rfl:   •  buPROPion XL (WELLBUTRIN XL) 150 MG 24 hr tablet, Take 150 mg by mouth Every Morning., Disp: , Rfl:   •  escitalopram (LEXAPRO) 20 MG tablet, Take 20 mg by mouth Daily., Disp: , Rfl:   •  exemestane (AROMASIN) 25 MG chemo tablet, TAKE 1 TABLET DAILY, Disp: 90 tablet, Rfl: 3  •  loratadine (CLARITIN) 10 MG tablet, Take 10 mg by mouth Daily., Disp: , Rfl:   •  Magnesium 250 MG tablet, Take 500 mg by mouth Daily., Disp: , Rfl:   •  Melatonin 3 MG capsule, Take  by mouth., Disp: , Rfl:   •  meloxicam (MOBIC) 15 MG tablet, TAKE 1 TABLET BY MOUTH DAILY,  Disp: 90 tablet, Rfl: 0  •  metFORMIN (GLUCOPHAGE) 500 MG tablet, TAKE 2 TABLETS TWICE A DAY, Disp: 360 tablet, Rfl: 3  •  multivitamin with minerals tablet tablet, Take 1 tablet by mouth Daily., Disp: , Rfl:   •  NON FORMULARY, OSTEOBIFLEX 1 TAB DAILY, Disp: , Rfl:   •  pantoprazole (PROTONIX) 40 MG EC tablet, Take 40 mg by mouth Daily., Disp: , Rfl:   •  polycarbophil 625 MG tablet tablet, Take 1,250 mg by mouth Daily., Disp: , Rfl:   •  Probiotic Product (PROBIOTIC BLEND PO), Take 1 tablet by mouth Daily., Disp: , Rfl:   •  Synthroid 50 MCG tablet, TAKE 1 TABLET DAILY, Disp: 90 tablet, Rfl: 1  •  temazepam (Restoril) 30 MG capsule, Take 1 capsule by mouth At Night As Needed for Sleep or Anxiety for up to 30 days., Disp: 30 capsule, Rfl: 1    Allergies   Allergen Reactions   • Codeine Nausea And Vomiting   • Ibuprofen Anaphylaxis       Family History   Problem Relation Age of Onset   • Arthritis Mother    • Gout Mother    • Parkinsonism Mother    • Miscarriages / Stillbirths Mother         Miscarriage of second child   • Lung cancer Father    • Arthritis Father    • Cancer Father    • Depression Father    • Mental illness Father    • Lung cancer Maternal Aunt    • Cancer Maternal Aunt    • Uterine cancer Paternal Aunt 60   • Cancer Paternal Aunt    • Prostate cancer Paternal Uncle    • Cancer Paternal Uncle    • Melanoma Paternal Grandmother    • Cancer Paternal Grandmother    • Depression Paternal Grandmother    • Liver disease Paternal Grandmother    • Mental illness Paternal Grandmother    • Prostate cancer Paternal Cousin    • Melanoma Paternal Cousin    • Esophageal cancer Paternal Cousin    • Thyroid cancer Maternal Cousin 40   • Esophageal cancer Maternal Cousin    • Ovarian cancer Maternal Cousin 50   • Heart disease Maternal Grandfather    • Diabetes Maternal Grandmother    • Alcohol abuse Maternal Uncle    • COPD Maternal Uncle    • Heart disease Maternal Uncle    • Heart disease Paternal Uncle    •  Liver disease Maternal Uncle        Cancer-related family history includes Cancer in her father, maternal aunt, paternal aunt, paternal grandmother, and paternal uncle; Esophageal cancer in her maternal cousin and paternal cousin; Lung cancer in her father and maternal aunt; Melanoma in her paternal cousin and paternal grandmother; Ovarian cancer (age of onset: 50) in her maternal cousin; Prostate cancer in her paternal cousin and paternal uncle; Thyroid cancer (age of onset: 40) in her maternal cousin; Uterine cancer (age of onset: 60) in her paternal aunt.    Social History     Tobacco Use   • Smoking status: Never   • Smokeless tobacco: Never   Vaping Use   • Vaping Use: Never used   Substance Use Topics   • Alcohol use: Not Currently     Comment: Very sporadically.  Not even once a month.  A glass of wine   • Drug use: Never     I have reviewed and confirmed the accuracy of the patient's history: Chief complaint, HPI, ROS and Subjective as entered by the MA/LPN/RN. Munirarachel Finney MD 02/16/23     SUBJECTIVE:    Patient denies any new issues.  She has kept her follow-up appointments with her physicians          REVIEW OF SYSTEMS:    Review of Systems   Constitutional: Negative for chills and fever.   HENT: Negative for ear pain, mouth sores, nosebleeds and sore throat.    Eyes: Negative for photophobia and visual disturbance.   Respiratory: Negative for wheezing and stridor.    Cardiovascular: Negative for chest pain and palpitations.   Gastrointestinal: Negative for abdominal pain, diarrhea, nausea and vomiting.   Endocrine: Negative for cold intolerance and heat intolerance.   Genitourinary: Negative for dysuria and hematuria.   Musculoskeletal: Negative for joint swelling and neck stiffness.   Skin: Negative for color change and rash.   Neurological: Negative for seizures and syncope.   Hematological: Negative for adenopathy.        No obvious bleeding   Psychiatric/Behavioral: Negative for agitation,  "confusion and hallucinations.     ROS as documented above    OBJECTIVE:    Vitals:    02/16/23 1126   BP: 131/82   Pulse: 102   Resp: 18   Temp: 97 °F (36.1 °C)   TempSrc: Infrared   Weight: 98.4 kg (217 lb)   Height: 152.4 cm (60\")   PainSc: 0-No pain       ECOG    (1) Restricted in physically strenuous activity, ambulatory and able to do work of light nature    Physical Exam   Constitutional: She is oriented to person, place, and time. No distress.   HENT:   Head: Normocephalic and atraumatic.   Eyes: Conjunctivae are normal. Right eye exhibits no discharge. Left eye exhibits no discharge. No scleral icterus.   Neck: No thyromegaly present.   Cardiovascular: Normal rate, regular rhythm and normal heart sounds. Exam reveals no gallop and no friction rub.   Pulmonary/Chest: Effort normal. No stridor. No respiratory distress. She has no wheezes. Right breast exhibits no inverted nipple, no mass, no nipple discharge, no skin change and no tenderness. Left breast exhibits no inverted nipple, no mass, no nipple discharge, no skin change and no tenderness. No breast bleeding. Breasts are symmetrical.   Bilateral breast exam is negative  Bilateral axillary exam is negative   Abdominal: Soft. Bowel sounds are normal. She exhibits no mass. There is no abdominal tenderness. There is no rebound and no guarding.   Musculoskeletal: Normal range of motion. No tenderness.   Lymphadenopathy:     She has no cervical adenopathy.   Neurological: She is alert and oriented to person, place, and time. She exhibits normal muscle tone.   Skin: Skin is warm. No rash noted. She is not diaphoretic. No erythema.   Psychiatric: Her behavior is normal.   Nursing note and vitals reviewed.    Physical exam as documented above    I have reexamined the patient and the results are consistent with the previously documented exam. Munira Finney MD     RECENT LABS    WBC   Date Value Ref Range Status   02/16/2023 8.32 3.40 - 10.80 10*3/mm3 " Final     RBC   Date Value Ref Range Status   02/16/2023 4.54 3.77 - 5.28 10*6/mm3 Final     Hemoglobin   Date Value Ref Range Status   02/16/2023 11.6 (L) 12.0 - 15.9 g/dL Final     Hematocrit   Date Value Ref Range Status   02/16/2023 37.3 34.0 - 46.6 % Final     MCV   Date Value Ref Range Status   02/16/2023 82.2 79.0 - 97.0 fL Final     MCH   Date Value Ref Range Status   02/16/2023 25.6 (L) 26.6 - 33.0 pg Final     MCHC   Date Value Ref Range Status   02/16/2023 31.1 (L) 31.5 - 35.7 g/dL Final     RDW   Date Value Ref Range Status   02/16/2023 16.7 (H) 12.3 - 15.4 % Final     RDW-SD   Date Value Ref Range Status   02/16/2023 49.4 37.0 - 54.0 fl Final     MPV   Date Value Ref Range Status   02/16/2023 11.4 6.0 - 12.0 fL Final     Platelets   Date Value Ref Range Status   02/16/2023 220 140 - 450 10*3/mm3 Final     Neutrophil %   Date Value Ref Range Status   02/16/2023 48.8 42.7 - 76.0 % Final     Lymphocyte %   Date Value Ref Range Status   02/16/2023 38.6 19.6 - 45.3 % Final     Monocyte %   Date Value Ref Range Status   02/16/2023 10.7 5.0 - 12.0 % Final     Eosinophil %   Date Value Ref Range Status   02/16/2023 1.7 0.3 - 6.2 % Final     Basophil %   Date Value Ref Range Status   02/16/2023 0.2 0.0 - 1.5 % Final     Immature Grans %   Date Value Ref Range Status   03/10/2021 0.2 0.0 - 0.5 % Final     Neutrophils, Absolute   Date Value Ref Range Status   02/16/2023 4.06 1.70 - 7.00 10*3/mm3 Final     Lymphocytes, Absolute   Date Value Ref Range Status   02/16/2023 3.21 (H) 0.70 - 3.10 10*3/mm3 Final     Monocytes, Absolute   Date Value Ref Range Status   02/16/2023 0.89 0.10 - 0.90 10*3/mm3 Final     Eosinophils, Absolute   Date Value Ref Range Status   02/16/2023 0.14 0.00 - 0.40 10*3/mm3 Final     Basophils, Absolute   Date Value Ref Range Status   02/16/2023 0.02 0.00 - 0.20 10*3/mm3 Final     Immature Grans, Absolute   Date Value Ref Range Status   03/10/2021 0.02 0.00 - 0.05 10*3/mm3 Final     nRBC    Date Value Ref Range Status   03/10/2021 0.0 0.0 - 0.2 /100 WBC Final       Lab Results   Component Value Date    GLUCOSE 102 (H) 08/16/2022    BUN 23 08/16/2022    CREATININE 1.02 (H) 08/16/2022    EGFRIFNONA 76 02/14/2022    EGFRIFAFRI 85 11/10/2016    BCR 22.5 08/16/2022    K 4.1 08/16/2022    CO2 28.0 08/16/2022    CALCIUM 9.7 08/16/2022    PROTENTOTREF 6.3 08/16/2021    ALBUMIN 4.10 08/16/2022    LABIL2 1.3 08/16/2021    AST 18 08/16/2022    ALT 17 08/16/2022           ASSESSMENT:    1. T1b N0 M0 invasive ductal carcinoma, ER positive, CO positive, HER-2/hiro negative, grade III tumor.  Ongoing surveillance  2. Status post right lumpectomy with sentinel lymph node biopsy.  Reviewed  3. Status post adjuvant chemotherapy with Cytoxan and Taxotere for four cycles.    4. BRCA 2 sequencing gene with 3036 deletion 4 deleterious mutation.  Surveillance care  5. Status post right breast radiation.   6. Recurrent anemia: We will complete anemia work-up today  7. Postmenopausal hormonal status.   8. Status post robotic complete hysterectomy and adnexectomy with benign findings.    9. Hypercholesterolemia on treatment.  Lipid panel 2/4/19 was normal.   10. Arimidex initiated December 2014, discontinued October 2016 due to side effects.  Aromasin initiated November 2016.  Remains on Aromasin.  We will continue the same  11. Iron deficiency anemia, resolved.   12. Monoclonal gammopathy of unknown significance.  Has been zoraida;l for past 2 years. Will discontinue checking.  13. Bilateral hip pain.  Resolved        PLANS:    Anemia work-up today: Watch out for results       She will continue Aromasin, Os-Quinton D till November 2024    MRI  breast will be due March 2023: Ordered today    Bilateral screening mammogram will be due October 2023    Patient will  be due for bone density in September 2023.  Order at next visit    Patient is scheduled for colonoscopy  March 2023.  She has an appointment coming up with   [facility]    Follow-up with dermatologist once a year: Reviewed    Dermatology apt with  Dr Buck once a year next due in October 2023    Follow up with Dr. Mayer for EUS for pancreas due March 2024.  MRI of the pancreas will be due in March 2023    Monoclonal labs have been normal past 2 years therefore will discontinue checking at this time.    She will continue monthly breast self exam and call for lumps, nipple discharge, skin discoloration. A CMP will be done today.        She will follow up with Dr. Mayer for pancreatic cancer screening.  We discussed use of pancreatic MRI alternating with EUS.          Patient is five years out from her breast cancer diagnosis.  I did discuss prophylactic mastectomies.  At this time patient wants to continue with breast conservation.  I will continue Aromasin for her for breast cancer prevention.  She has not had any significant toxicities on this treatment.             I have reviewed labs results, imaging, vitals, and medications with the patient today. Follow up in 6 months with me or earlier as needed for problems         Patient verbalized understanding and is in agreement of the above plan.      I spent 40 total minutes, face-to-face, caring for Sindy today.  90% of this time involved counseling and/or coordination of care as documented within this note.

## 2023-02-15 NOTE — PROGRESS NOTES
"Chief Complaint  Annual Exam    Subjective      Sindy Martin presents to [facility]  History of Present Illness  For annual exam.    The patient presents today for a wellness check.    The patient states that she has been doing very well. She has had sinus and nasal congestion with clear phlegm for approximately 1 month. She denies any fever, but notes her symptoms initiated as throat irritation, but it has improved. She had laryngitis for approximately 1 week.     The patient reports hearing impairment and that she wears hearing aids that work well for her.     The patient denies history of cataracts, but was told that a cataract is developing in one eye, but the other is normal.     The patient reports history of breast cancer 9 years ago with no recurrence in 9 years. She has an appointment with Dr. Finney tomorrow, 02/16/2023, for her 6-month checkup and laboratory testing. She has a mammogram and a breast MRI annually within 6 months of each other.    The patient is no longer experiencing migraine headaches. Her shoulders and knees are doing well and physical therapy helped.     The patient reports that her cholesterol was normal on her previous laboratory testing.       Objective   Vital Signs:  /80 (BP Location: Right arm, Cuff Size: Large Adult)   Pulse 105   Resp 16   Ht 152.4 cm (60\")   Wt 98 kg (216 lb)   SpO2 95%   BMI 42.18 kg/m²   Estimated body mass index is 42.18 kg/m² as calculated from the following:    Height as of this encounter: 152.4 cm (60\").    Weight as of this encounter: 98 kg (216 lb).             Physical Exam  Constitutional:       Appearance: Normal appearance. She is well-developed and normal weight.   HENT:      Head: Normocephalic and atraumatic.      Right Ear: Tympanic membrane, ear canal and external ear normal.      Left Ear: Tympanic membrane, ear canal and external ear normal.      Nose: Nose normal.      Mouth/Throat:      Mouth: " Mucous membranes are moist.      Pharynx: Oropharynx is clear. No oropharyngeal exudate.   Eyes:      Extraocular Movements: Extraocular movements intact.      Conjunctiva/sclera: Conjunctivae normal.      Pupils: Pupils are equal, round, and reactive to light.   Cardiovascular:      Rate and Rhythm: Normal rate and regular rhythm.      Pulses: Normal pulses.      Heart sounds: Normal heart sounds.   Pulmonary:      Effort: Pulmonary effort is normal.      Breath sounds: Normal breath sounds.   Abdominal:      General: Bowel sounds are normal.      Palpations: Abdomen is soft.   Musculoskeletal:         General: Normal range of motion.      Cervical back: Normal range of motion and neck supple.   Skin:     General: Skin is warm and dry.   Neurological:      General: No focal deficit present.      Mental Status: She is alert and oriented to person, place, and time. Mental status is at baseline.   Psychiatric:         Mood and Affect: Mood normal.         Behavior: Behavior normal.         Thought Content: Thought content normal.         Judgment: Judgment normal.          Assessment and Plan     1. Preventative healthcare  - She is a 62-year-old white female who is in today for a 62-year-old visit. She is going to be going on to Medicare next year, so she will be coming in next year for her Medicare wellness visit. Her preventative healthcare this year includes mammogram, and she is pretty much up to date with laboratory tests and procedures. She had her colonoscopy done in 2016 and she is not due again until 2026. She feels well. This is a visit that we have seen her for a couple of times in the past for some shoulder problems, but basically right now all of her joints are doing well, and she feels well. She has had laboratory tests done last year and they looked great. She is going to have some more done by Dr. Reg bradley for her breast cancer diagnosis. She is about 9 years out on the breast cancer diagnosis and  has been cancer free. She is very pleased with that and as she should be. Her eyes are up to date as far as examination is concerned and her review of system was really unremarkable. Physical examination was good. She is a little overweight, but she is working on that with a low carb diet and exercise.    2. Hyperlipidemia  - The patient has had high lipids and she tries to follow a low carb diet. She tries to exercise some in the form of walking, and we will be checking her lipid panel again here very soon. She does take atorvastatin 20 mg daily.    3. Hypertension  - The patient's blood pressure today was 120/80 mmHg. She has not really needed blood pressure medicine for a while, although in the past she has at various times required medicines to bring her pressure down to normal. The last year or so with a little bit of weight loss and little bit more exercise, she has actually been able to control this with just diet and exercise. Right now, she is not needing any medications.    4. Sensorineural hearing loss  - The patient has had hearing loss for many years. Seems to be bilateral sensorineural when she has hearing aids. With the hearing aids, she can hear quite well and she can carry on conversation without difficulty.    5. Hypothyroidism  - The patient takes thyroid replacement. She takes Synthroid 50 mcg daily, and her TSH and free T4 are very normal. We will continue these indefinitely.    6. Morbid obesity  - The patient's BMI is 42 kg/m². She is trying with low carb diet and exercise.    7. History of breast cancer  - She is followed by Dr. Finney and she is about 9 years post cancer diagnosis. She has no evidence of disease.       Follow Up   Patient was given instructions and counseling regarding her condition or for health maintenance advice. Please see specific information pulled into the AVS if appropriate.     Transcribed from ambient dictation for Rubens Cali MD by Yenni Johnson.  02/15/23    15:10 EST    Patient or patient representative verbalized consent to the visit recording.  I have personally performed the services described in this document as transcribed by the above individual, and it is both accurate and complete.  uRbens Cali MD  2/19/2023  16:57 EST

## 2023-02-16 ENCOUNTER — OFFICE VISIT (OUTPATIENT)
Dept: ONCOLOGY | Facility: CLINIC | Age: 63
End: 2023-02-16
Payer: COMMERCIAL

## 2023-02-16 ENCOUNTER — LAB (OUTPATIENT)
Dept: LAB | Facility: HOSPITAL | Age: 63
End: 2023-02-16
Payer: COMMERCIAL

## 2023-02-16 VITALS
SYSTOLIC BLOOD PRESSURE: 131 MMHG | BODY MASS INDEX: 42.6 KG/M2 | RESPIRATION RATE: 18 BRPM | HEIGHT: 60 IN | WEIGHT: 217 LBS | HEART RATE: 102 BPM | DIASTOLIC BLOOD PRESSURE: 82 MMHG | TEMPERATURE: 97 F

## 2023-02-16 DIAGNOSIS — Z15.09 BRCA2 GENE MUTATION POSITIVE: ICD-10-CM

## 2023-02-16 DIAGNOSIS — Z12.9 CANCER SCREENING: ICD-10-CM

## 2023-02-16 DIAGNOSIS — C50.911 MALIGNANT NEOPLASM OF RIGHT FEMALE BREAST, UNSPECIFIED ESTROGEN RECEPTOR STATUS, UNSPECIFIED SITE OF BREAST: Primary | ICD-10-CM

## 2023-02-16 DIAGNOSIS — Z15.01 BRCA2 GENE MUTATION POSITIVE: ICD-10-CM

## 2023-02-16 DIAGNOSIS — D64.9 ANEMIA, UNSPECIFIED TYPE: ICD-10-CM

## 2023-02-16 LAB
ALBUMIN SERPL-MCNC: 4.1 G/DL (ref 3.5–5.2)
ALBUMIN/GLOB SERPL: 1.7 G/DL
ALP SERPL-CCNC: 114 U/L (ref 39–117)
ALT SERPL W P-5'-P-CCNC: 19 U/L (ref 1–33)
ANION GAP SERPL CALCULATED.3IONS-SCNC: 10 MMOL/L (ref 5–15)
AST SERPL-CCNC: 18 U/L (ref 1–32)
BASOPHILS # BLD AUTO: 0.02 10*3/MM3 (ref 0–0.2)
BASOPHILS NFR BLD AUTO: 0.2 % (ref 0–1.5)
BILIRUB SERPL-MCNC: 0.2 MG/DL (ref 0–1.2)
BUN SERPL-MCNC: 18 MG/DL (ref 8–23)
BUN/CREAT SERPL: 29.5 (ref 7–25)
CALCIUM SPEC-SCNC: 9.5 MG/DL (ref 8.6–10.5)
CHLORIDE SERPL-SCNC: 105 MMOL/L (ref 98–107)
CO2 SERPL-SCNC: 28 MMOL/L (ref 22–29)
CREAT SERPL-MCNC: 0.61 MG/DL (ref 0.57–1)
DEPRECATED RDW RBC AUTO: 49.4 FL (ref 37–54)
EGFRCR SERPLBLD CKD-EPI 2021: 100 ML/MIN/1.73
EOSINOPHIL # BLD AUTO: 0.14 10*3/MM3 (ref 0–0.4)
EOSINOPHIL NFR BLD AUTO: 1.7 % (ref 0.3–6.2)
ERYTHROCYTE [DISTWIDTH] IN BLOOD BY AUTOMATED COUNT: 16.7 % (ref 12.3–15.4)
FERRITIN SERPL-MCNC: 11.27 NG/ML (ref 13–150)
FOLATE SERPL-MCNC: >20 NG/ML (ref 4.78–24.2)
GLOBULIN UR ELPH-MCNC: 2.4 GM/DL
GLUCOSE SERPL-MCNC: 115 MG/DL (ref 65–99)
HAPTOGLOB SERPL-MCNC: 237 MG/DL (ref 30–200)
HCT VFR BLD AUTO: 37.3 % (ref 34–46.6)
HGB BLD-MCNC: 11.6 G/DL (ref 12–15.9)
HOLD SPECIMEN: NORMAL
IRON 24H UR-MRATE: 27 MCG/DL (ref 37–145)
IRON SATN MFR SERPL: 5 % (ref 20–50)
LDH SERPL-CCNC: 204 U/L (ref 135–214)
LYMPHOCYTES # BLD AUTO: 3.21 10*3/MM3 (ref 0.7–3.1)
LYMPHOCYTES NFR BLD AUTO: 38.6 % (ref 19.6–45.3)
MCH RBC QN AUTO: 25.6 PG (ref 26.6–33)
MCHC RBC AUTO-ENTMCNC: 31.1 G/DL (ref 31.5–35.7)
MCV RBC AUTO: 82.2 FL (ref 79–97)
MONOCYTES # BLD AUTO: 0.89 10*3/MM3 (ref 0.1–0.9)
MONOCYTES NFR BLD AUTO: 10.7 % (ref 5–12)
NEUTROPHILS NFR BLD AUTO: 4.06 10*3/MM3 (ref 1.7–7)
NEUTROPHILS NFR BLD AUTO: 48.8 % (ref 42.7–76)
PLATELET # BLD AUTO: 220 10*3/MM3 (ref 140–450)
PMV BLD AUTO: 11.4 FL (ref 6–12)
POTASSIUM SERPL-SCNC: 4 MMOL/L (ref 3.5–5.2)
PROT SERPL-MCNC: 6.5 G/DL (ref 6–8.5)
RBC # BLD AUTO: 4.54 10*6/MM3 (ref 3.77–5.28)
RETICS # AUTO: 0.06 10*6/MM3 (ref 0.02–0.13)
RETICS/RBC NFR AUTO: 1.35 % (ref 0.7–1.9)
SODIUM SERPL-SCNC: 143 MMOL/L (ref 136–145)
TIBC SERPL-MCNC: 519 MCG/DL (ref 298–536)
TRANSFERRIN SERPL-MCNC: 348 MG/DL (ref 200–360)
VIT B12 BLD-MCNC: 573 PG/ML (ref 211–946)
WBC NRBC COR # BLD: 8.32 10*3/MM3 (ref 3.4–10.8)

## 2023-02-16 PROCEDURE — 82607 VITAMIN B-12: CPT | Performed by: INTERNAL MEDICINE

## 2023-02-16 PROCEDURE — 85045 AUTOMATED RETICULOCYTE COUNT: CPT | Performed by: INTERNAL MEDICINE

## 2023-02-16 PROCEDURE — 83615 LACTATE (LD) (LDH) ENZYME: CPT

## 2023-02-16 PROCEDURE — 85025 COMPLETE CBC W/AUTO DIFF WBC: CPT

## 2023-02-16 PROCEDURE — 80053 COMPREHEN METABOLIC PANEL: CPT | Performed by: INTERNAL MEDICINE

## 2023-02-16 PROCEDURE — 84466 ASSAY OF TRANSFERRIN: CPT | Performed by: INTERNAL MEDICINE

## 2023-02-16 PROCEDURE — 82746 ASSAY OF FOLIC ACID SERUM: CPT | Performed by: INTERNAL MEDICINE

## 2023-02-16 PROCEDURE — 36415 COLL VENOUS BLD VENIPUNCTURE: CPT

## 2023-02-16 PROCEDURE — 82728 ASSAY OF FERRITIN: CPT | Performed by: INTERNAL MEDICINE

## 2023-02-16 PROCEDURE — 83540 ASSAY OF IRON: CPT | Performed by: INTERNAL MEDICINE

## 2023-02-16 PROCEDURE — 99215 OFFICE O/P EST HI 40 MIN: CPT | Performed by: INTERNAL MEDICINE

## 2023-02-16 PROCEDURE — 83010 ASSAY OF HAPTOGLOBIN QUANT: CPT | Performed by: INTERNAL MEDICINE

## 2023-02-17 LAB
ALBUMIN SERPL ELPH-MCNC: 3.6 G/DL (ref 2.9–4.4)
ALBUMIN/GLOB SERPL: 1.4 {RATIO} (ref 0.7–1.7)
ALPHA1 GLOB SERPL ELPH-MCNC: 0.2 G/DL (ref 0–0.4)
ALPHA2 GLOB SERPL ELPH-MCNC: 0.9 G/DL (ref 0.4–1)
B-GLOBULIN SERPL ELPH-MCNC: 1 G/DL (ref 0.7–1.3)
GAMMA GLOB SERPL ELPH-MCNC: 0.5 G/DL (ref 0.4–1.8)
GLOBULIN SER CALC-MCNC: 2.6 G/DL (ref 2.2–3.9)
LABORATORY COMMENT REPORT: NORMAL
M PROTEIN SERPL ELPH-MCNC: NORMAL G/DL
PROT PATTERN SERPL ELPH-IMP: NORMAL
PROT SERPL-MCNC: 6.2 G/DL (ref 6–8.5)

## 2023-03-01 ENCOUNTER — TELEPHONE (OUTPATIENT)
Dept: ONCOLOGY | Facility: CLINIC | Age: 63
End: 2023-03-01
Payer: COMMERCIAL

## 2023-03-01 DIAGNOSIS — D64.9 ANEMIA, UNSPECIFIED TYPE: Primary | ICD-10-CM

## 2023-03-01 RX ORDER — FERROUS SULFATE 325(65) MG
325 TABLET ORAL
Qty: 30 TABLET | Refills: 2 | Status: SHIPPED | OUTPATIENT
Start: 2023-03-01

## 2023-03-01 NOTE — TELEPHONE ENCOUNTER
Called pt to discuss low iron level. I told her that Dr. Finney has ordered IV iron and asked if she has taken any iron supplements in the past. Pt stated that she has taken them in the past and did not have any issues. I told her that I would check if she should start oral iron or if IV iron is needed. I told her that if we do proceed with IV iron, we will call her once it is set up. I also told her that Dr. Finney would like to check a UA and for her to see a GI doctor. Pt stated she sees GI next month and was agreeable to provide a urine sample. Lab appt scheduled.    Spoke to ELIE Scott about oral vs IV iron. She said if malabsorption was documented, IV iron would be appropriate. If not, we will have to try oral. I did not see malabsorption documented. Ferrous sulfate prescription sent to pharmacy.

## 2023-03-06 ENCOUNTER — LAB (OUTPATIENT)
Dept: LAB | Facility: HOSPITAL | Age: 63
End: 2023-03-06
Payer: COMMERCIAL

## 2023-03-06 DIAGNOSIS — D64.9 ANEMIA, UNSPECIFIED TYPE: ICD-10-CM

## 2023-03-06 LAB
BACTERIA UR QL AUTO: ABNORMAL /HPF
BILIRUB UR QL STRIP: NEGATIVE
CLARITY UR: CLEAR
COD CRY URNS QL: ABNORMAL /HPF
COLOR UR: YELLOW
GLUCOSE UR STRIP-MCNC: NEGATIVE MG/DL
HGB UR QL STRIP.AUTO: NEGATIVE
HYALINE CASTS UR QL AUTO: ABNORMAL /LPF
KETONES UR QL STRIP: ABNORMAL
LEUKOCYTE ESTERASE UR QL STRIP.AUTO: ABNORMAL
NITRITE UR QL STRIP: NEGATIVE
PH UR STRIP.AUTO: 5.5 [PH] (ref 5–8)
PROT UR QL STRIP: NEGATIVE
RBC # UR STRIP: ABNORMAL /HPF
REF LAB TEST METHOD: ABNORMAL
SP GR UR STRIP: >=1.03 (ref 1–1.03)
SQUAMOUS #/AREA URNS HPF: ABNORMAL /HPF
UROBILINOGEN UR QL STRIP: ABNORMAL
WBC # UR STRIP: ABNORMAL /HPF

## 2023-03-06 PROCEDURE — 87086 URINE CULTURE/COLONY COUNT: CPT | Performed by: INTERNAL MEDICINE

## 2023-03-06 PROCEDURE — 81001 URINALYSIS AUTO W/SCOPE: CPT | Performed by: INTERNAL MEDICINE

## 2023-03-07 LAB — BACTERIA SPEC AEROBE CULT: NO GROWTH

## 2023-03-18 ENCOUNTER — HOSPITAL ENCOUNTER (EMERGENCY)
Facility: HOSPITAL | Age: 63
Discharge: HOME | End: 2023-03-18
Attending: EMERGENCY MEDICINE | Admitting: EMERGENCY MEDICINE
Payer: COMMERCIAL

## 2023-03-18 ENCOUNTER — APPOINTMENT (OUTPATIENT)
Dept: GENERAL RADIOLOGY | Facility: HOSPITAL | Age: 63
End: 2023-03-18
Payer: COMMERCIAL

## 2023-03-18 VITALS
BODY MASS INDEX: 41.29 KG/M2 | DIASTOLIC BLOOD PRESSURE: 85 MMHG | WEIGHT: 218.7 LBS | HEIGHT: 61 IN | SYSTOLIC BLOOD PRESSURE: 145 MMHG | OXYGEN SATURATION: 94 % | RESPIRATION RATE: 17 BRPM | HEART RATE: 99 BPM | TEMPERATURE: 99.4 F

## 2023-03-18 DIAGNOSIS — B34.8 INFECTION DUE TO HUMAN METAPNEUMOVIRUS (HMPV): ICD-10-CM

## 2023-03-18 DIAGNOSIS — J18.9 PNEUMONIA OF RIGHT LUNG DUE TO INFECTIOUS ORGANISM, UNSPECIFIED PART OF LUNG: Primary | ICD-10-CM

## 2023-03-18 LAB
ALBUMIN SERPL-MCNC: 4.1 G/DL (ref 3.5–5.2)
ALBUMIN/GLOB SERPL: 1.4 G/DL
ALP SERPL-CCNC: 100 U/L (ref 39–117)
ALT SERPL W P-5'-P-CCNC: 25 U/L (ref 1–33)
ANION GAP SERPL CALCULATED.3IONS-SCNC: 16 MMOL/L (ref 5–15)
AST SERPL-CCNC: 24 U/L (ref 1–32)
B PARAPERT DNA SPEC QL NAA+PROBE: NOT DETECTED
B PERT DNA SPEC QL NAA+PROBE: NOT DETECTED
BASOPHILS # BLD AUTO: 0 10*3/MM3 (ref 0–0.2)
BASOPHILS NFR BLD AUTO: 0.2 % (ref 0–1.5)
BILIRUB SERPL-MCNC: 0.3 MG/DL (ref 0–1.2)
BUN SERPL-MCNC: 13 MG/DL (ref 8–23)
BUN/CREAT SERPL: 19.4 (ref 7–25)
C PNEUM DNA NPH QL NAA+NON-PROBE: NOT DETECTED
CALCIUM SPEC-SCNC: 8.9 MG/DL (ref 8.6–10.5)
CHLORIDE SERPL-SCNC: 101 MMOL/L (ref 98–107)
CO2 SERPL-SCNC: 21 MMOL/L (ref 22–29)
CREAT SERPL-MCNC: 0.67 MG/DL (ref 0.57–1)
D-LACTATE SERPL-SCNC: 1.1 MMOL/L (ref 0.3–2)
DEPRECATED RDW RBC AUTO: 50.3 FL (ref 37–54)
EGFRCR SERPLBLD CKD-EPI 2021: 97.7 ML/MIN/1.73
EOSINOPHIL # BLD AUTO: 0 10*3/MM3 (ref 0–0.4)
EOSINOPHIL NFR BLD AUTO: 0.1 % (ref 0.3–6.2)
ERYTHROCYTE [DISTWIDTH] IN BLOOD BY AUTOMATED COUNT: 18.3 % (ref 12.3–15.4)
FLUAV SUBTYP SPEC NAA+PROBE: NOT DETECTED
FLUBV RNA ISLT QL NAA+PROBE: NOT DETECTED
GLOBULIN UR ELPH-MCNC: 2.9 GM/DL
GLUCOSE SERPL-MCNC: 135 MG/DL (ref 65–99)
HADV DNA SPEC NAA+PROBE: NOT DETECTED
HCOV 229E RNA SPEC QL NAA+PROBE: NOT DETECTED
HCOV HKU1 RNA SPEC QL NAA+PROBE: NOT DETECTED
HCOV NL63 RNA SPEC QL NAA+PROBE: NOT DETECTED
HCOV OC43 RNA SPEC QL NAA+PROBE: NOT DETECTED
HCT VFR BLD AUTO: 37.7 % (ref 34–46.6)
HGB BLD-MCNC: 11.8 G/DL (ref 12–15.9)
HMPV RNA NPH QL NAA+NON-PROBE: DETECTED
HOLD SPECIMEN: NORMAL
HOLD SPECIMEN: NORMAL
HPIV1 RNA ISLT QL NAA+PROBE: NOT DETECTED
HPIV2 RNA SPEC QL NAA+PROBE: NOT DETECTED
HPIV3 RNA NPH QL NAA+PROBE: NOT DETECTED
HPIV4 P GENE NPH QL NAA+PROBE: NOT DETECTED
LYMPHOCYTES # BLD AUTO: 0.6 10*3/MM3 (ref 0.7–3.1)
LYMPHOCYTES NFR BLD AUTO: 9.1 % (ref 19.6–45.3)
M PNEUMO IGG SER IA-ACNC: NOT DETECTED
MCH RBC QN AUTO: 24.9 PG (ref 26.6–33)
MCHC RBC AUTO-ENTMCNC: 31.2 G/DL (ref 31.5–35.7)
MCV RBC AUTO: 79.7 FL (ref 79–97)
MONOCYTES # BLD AUTO: 0.4 10*3/MM3 (ref 0.1–0.9)
MONOCYTES NFR BLD AUTO: 6.2 % (ref 5–12)
NEUTROPHILS NFR BLD AUTO: 5.6 10*3/MM3 (ref 1.7–7)
NEUTROPHILS NFR BLD AUTO: 84.4 % (ref 42.7–76)
NRBC BLD AUTO-RTO: 0.1 /100 WBC (ref 0–0.2)
PLATELET # BLD AUTO: 158 10*3/MM3 (ref 140–450)
PMV BLD AUTO: 9.6 FL (ref 6–12)
POTASSIUM SERPL-SCNC: 3.9 MMOL/L (ref 3.5–5.2)
PROCALCITONIN SERPL-MCNC: 0.07 NG/ML (ref 0–0.25)
PROT SERPL-MCNC: 7 G/DL (ref 6–8.5)
RBC # BLD AUTO: 4.72 10*6/MM3 (ref 3.77–5.28)
RHINOVIRUS RNA SPEC NAA+PROBE: NOT DETECTED
RSV RNA NPH QL NAA+NON-PROBE: NOT DETECTED
SARS-COV-2 RNA NPH QL NAA+NON-PROBE: NOT DETECTED
SODIUM SERPL-SCNC: 138 MMOL/L (ref 136–145)
WBC NRBC COR # BLD: 6.7 10*3/MM3 (ref 3.4–10.8)
WHOLE BLOOD HOLD COAG: NORMAL
WHOLE BLOOD HOLD SPECIMEN: NORMAL

## 2023-03-18 PROCEDURE — 85025 COMPLETE CBC W/AUTO DIFF WBC: CPT | Performed by: EMERGENCY MEDICINE

## 2023-03-18 PROCEDURE — 83605 ASSAY OF LACTIC ACID: CPT

## 2023-03-18 PROCEDURE — 96365 THER/PROPH/DIAG IV INF INIT: CPT

## 2023-03-18 PROCEDURE — 87040 BLOOD CULTURE FOR BACTERIA: CPT | Performed by: EMERGENCY MEDICINE

## 2023-03-18 PROCEDURE — 96368 THER/DIAG CONCURRENT INF: CPT

## 2023-03-18 PROCEDURE — 99284 EMERGENCY DEPT VISIT MOD MDM: CPT

## 2023-03-18 PROCEDURE — 71045 X-RAY EXAM CHEST 1 VIEW: CPT

## 2023-03-18 PROCEDURE — 80053 COMPREHEN METABOLIC PANEL: CPT | Performed by: EMERGENCY MEDICINE

## 2023-03-18 PROCEDURE — 84145 PROCALCITONIN (PCT): CPT | Performed by: EMERGENCY MEDICINE

## 2023-03-18 PROCEDURE — 25010000002 CEFTRIAXONE PER 250 MG: Performed by: EMERGENCY MEDICINE

## 2023-03-18 PROCEDURE — 0202U NFCT DS 22 TRGT SARS-COV-2: CPT | Performed by: EMERGENCY MEDICINE

## 2023-03-18 PROCEDURE — 36415 COLL VENOUS BLD VENIPUNCTURE: CPT

## 2023-03-18 RX ORDER — CEFDINIR 300 MG/1
300 CAPSULE ORAL 2 TIMES DAILY
Qty: 20 CAPSULE | Refills: 0 | Status: SHIPPED | OUTPATIENT
Start: 2023-03-18 | End: 2023-03-18 | Stop reason: SDUPTHER

## 2023-03-18 RX ORDER — DOXYCYCLINE 100 MG/1
100 CAPSULE ORAL 2 TIMES DAILY
Qty: 20 CAPSULE | Refills: 0 | Status: SHIPPED | OUTPATIENT
Start: 2023-03-18

## 2023-03-18 RX ORDER — SODIUM CHLORIDE 0.9 % (FLUSH) 0.9 %
10 SYRINGE (ML) INJECTION AS NEEDED
Status: DISCONTINUED | OUTPATIENT
Start: 2023-03-18 | End: 2023-03-18 | Stop reason: HOSPADM

## 2023-03-18 RX ORDER — CEFDINIR 300 MG/1
300 CAPSULE ORAL 2 TIMES DAILY
Qty: 20 CAPSULE | Refills: 0 | Status: SHIPPED | OUTPATIENT
Start: 2023-03-18

## 2023-03-18 RX ORDER — DOXYCYCLINE 100 MG/1
100 CAPSULE ORAL 2 TIMES DAILY
Qty: 20 CAPSULE | Refills: 0 | Status: SHIPPED | OUTPATIENT
Start: 2023-03-18 | End: 2023-03-18 | Stop reason: SDUPTHER

## 2023-03-18 RX ADMIN — CEFTRIAXONE 2 G: 2 INJECTION, POWDER, FOR SOLUTION INTRAMUSCULAR; INTRAVENOUS at 04:45

## 2023-03-18 RX ADMIN — DOXYCYCLINE 100 MG: 100 INJECTION, POWDER, LYOPHILIZED, FOR SOLUTION INTRAVENOUS at 04:45

## 2023-03-18 NOTE — ED PROVIDER NOTES
Subjective   History of Present Illness  62-year-old female without any history of chronic lung disease complains of fever, cough, muscle aches, nausea and vomiting since Thursday.  Patient had a URI in the past month but did not seek medical care.  No recent IV or oral antibiotics.        Review of Systems   Constitutional: Positive for fever.   Respiratory: Positive for cough and shortness of breath.    Cardiovascular:        Chest soreness with cough only   Gastrointestinal: Positive for nausea and vomiting.   All other systems reviewed and are negative.      Past Medical History:   Diagnosis Date   • Arthritis    • Depression    • Hearing loss    • Hypertension    • Hypothyroidism    • Low back pain        Allergies   Allergen Reactions   • Codeine Nausea And Vomiting   • Ibuprofen Anaphylaxis       Past Surgical History:   Procedure Laterality Date   • APPENDECTOMY     • BREAST BIOPSY     • BREAST LUMPECTOMY     • CARPAL TUNNEL RELEASE     • CHOLECYSTECTOMY     • COLONOSCOPY  2018 (?)   • HYSTERECTOMY     • LYMPH NODE BIOPSY  2014    [place] node under left arm   • OOPHORECTOMY     • UPPER ENDOSCOPIC ULTRASOUND W/ FNA N/A 04/28/2022    Procedure: EUS WITH BIOPSY;  Surgeon: Roldan Mayer MD;  Location: [facility] ENDOSCOPY;  Service: Gastroenterology;  Laterality: N/A;  HIATEL HERNIA, GASTRIC POLYP       Family History   Problem Relation Age of Onset   • Arthritis Mother    • Gout Mother    • Parkinsonism Mother    • Miscarriages / Stillbirths Mother         Miscarriage of second child   • Lung cancer Father    • Arthritis Father    • Cancer Father    • Depression Father    • Mental illness Father    • Lung cancer Maternal Aunt    • Cancer Maternal Aunt    • Uterine cancer Paternal Aunt 60   • Cancer Paternal Aunt    • Prostate cancer Paternal Uncle    • Cancer Paternal Uncle    • Melanoma Paternal Grandmother    • Cancer Paternal Grandmother    • Depression Paternal Grandmother    • Liver disease Paternal  Grandmother    • Mental illness Paternal Grandmother    • Prostate cancer Paternal Cousin    • Melanoma Paternal Cousin    • Esophageal cancer Paternal Cousin    • Thyroid cancer Maternal Cousin 40   • Esophageal cancer Maternal Cousin    • Ovarian cancer Maternal Cousin 50   • Heart disease Maternal Grandfather    • Diabetes Maternal Grandmother    • Alcohol abuse Maternal Uncle    • COPD Maternal Uncle    • Heart disease Maternal Uncle    • Heart disease Paternal Uncle    • Liver disease Maternal Uncle        Social History     Socioeconomic History   • Marital status:    Tobacco Use   • Smoking status: Never   • Smokeless tobacco: Never   Vaping Use   • Vaping Use: Never used   Substance and Sexual Activity   • Alcohol use: Not Currently     Comment: Very sporadically.  Not even once a month.  A glass of wine   • Drug use: Never   • Sexual activity: Not Currently     Partners: Male     Birth control/protection: Post-menopausal, Hysterectomy     Comment: Had total hysterectomy - 2015           Objective   Physical Exam  Constitutional:       Appearance: Normal appearance.   HENT:      Head: Normocephalic and atraumatic.      Mouth/Throat:      Mouth: Mucous membranes are moist.      Pharynx: Oropharynx is clear.   Cardiovascular:      Rate and Rhythm: Normal rate and regular rhythm.      Heart sounds: Normal heart sounds.   Pulmonary:      Comments: No distress, right-sided rhonchi  Abdominal:      General: Bowel sounds are normal. There is no distension.      Palpations: Abdomen is soft.      Tenderness: There is no abdominal tenderness.   Musculoskeletal:         General: No swelling or tenderness. Normal range of motion.   Skin:     General: Skin is warm and dry.      Capillary Refill: Capillary refill takes less than 2 seconds.   Neurological:      General: No focal deficit present.      Mental Status: She is alert and oriented to person, place, and time.   Psychiatric:         Mood and Affect: Mood  normal.         Behavior: Behavior normal.         Procedures           ED Course                                           Medical Decision Making  Chronicity of positive PCR panel not clear.  This could be a persistent positive from her previous upper respiratory infection with a superimposed bacterial pneumonia.  Will cover with antibiotics for now.  Return precautions reviewed.    Infection due to human metapneumovirus (hMPV): acute illness or injury  Pneumonia of right lung due to infectious organism, unspecified part of lung: acute illness or injury  Amount and/or Complexity of Data Reviewed  Labs: ordered.     Details: Positive human metapneumovirus  Radiology: ordered.     Details: Right-sided infiltrate      Risk  Prescription drug management.          Final diagnoses:   Pneumonia of right lung due to infectious organism, unspecified part of lung   Infection due to human metapneumovirus (hMPV)       ED Disposition  ED Disposition     ED Disposition   Discharge    Condition   Stable    Comment   --             Rubens Cali MD  [address]  [place] [address] [phone]    Schedule an appointment as soon as possible for a visit            Medication List      New Prescriptions    cefdinir 300 MG capsule  Commonly known as: OMNICEF  Take 1 capsule by mouth 2 (Two) Times a Day.     doxycycline 100 MG capsule  Commonly known as: MONODOX  Take 1 capsule by mouth 2 (Two) Times a Day.           Where to Get Your Medications      These medications were sent to [id number] - [place], [place] - [address] AT [facility] - [phone]  - [phone] FX  [address], [place] [address]    Phone: [phone]   · cefdinir 300 MG capsule  · doxycycline 100 MG capsule          Shane Vidales MD  03/18/23 0516

## 2023-03-22 RX ORDER — CHLORCYCLIZINE HYDROCHLORIDE AND PSEUDOEPHEDRINE HYDROCHLORIDE 25; 60 MG/1; MG/1
1 TABLET ORAL 2 TIMES DAILY
Qty: 30 TABLET | Refills: 1 | Status: SHIPPED | OUTPATIENT
Start: 2023-03-22 | End: 2023-04-06

## 2023-03-23 LAB
BACTERIA SPEC AEROBE CULT: NORMAL
BACTERIA SPEC AEROBE CULT: NORMAL

## 2023-03-31 ENCOUNTER — TELEPHONE (OUTPATIENT)
Dept: FAMILY MEDICINE CLINIC | Facility: CLINIC | Age: 63
End: 2023-03-31
Payer: COMMERCIAL

## 2023-03-31 NOTE — TELEPHONE ENCOUNTER
Caller: Sindy Martin    Relationship: Self    Best call back number: [phone]    Do you know the name of the person who called: GEGE    What was the call regarding: PATIENT WANTED TO LET GEGE KNOW THAT 4/17  AM WITH DR TURNER WILL WORK FOR HER.     Do you require a callback: YES, PLEASE ADVISE WHEN SCHEDULED. ATTEMPTED WARM TRANSFER, NO ANSWER.

## 2023-04-06 RX ORDER — MELOXICAM 15 MG/1
TABLET ORAL
Qty: 90 TABLET | Refills: 0 | Status: SHIPPED | OUTPATIENT
Start: 2023-04-06

## 2023-04-10 ENCOUNTER — HOSPITAL ENCOUNTER (OUTPATIENT)
Dept: MRI IMAGING | Facility: HOSPITAL | Age: 63
Discharge: HOME | End: 2023-04-10
Admitting: INTERNAL MEDICINE
Payer: COMMERCIAL

## 2023-04-10 DIAGNOSIS — Z15.09 BRCA2 GENE MUTATION POSITIVE: ICD-10-CM

## 2023-04-10 DIAGNOSIS — C50.911 MALIGNANT NEOPLASM OF RIGHT FEMALE BREAST, UNSPECIFIED ESTROGEN RECEPTOR STATUS, UNSPECIFIED SITE OF BREAST: ICD-10-CM

## 2023-04-10 DIAGNOSIS — Z15.01 BRCA2 GENE MUTATION POSITIVE: ICD-10-CM

## 2023-04-10 PROCEDURE — 25010000002 GADOTERIDOL PER 1 ML: Performed by: INTERNAL MEDICINE

## 2023-04-10 PROCEDURE — 77049 MRI BREAST C-+ W/CAD BI: CPT

## 2023-04-10 PROCEDURE — A9579 GAD-BASE MR CONTRAST NOS,1ML: HCPCS | Performed by: INTERNAL MEDICINE

## 2023-04-10 RX ORDER — LEVOTHYROXINE SODIUM 50 MCG
TABLET ORAL
Qty: 90 TABLET | Refills: 1 | Status: SHIPPED | OUTPATIENT
Start: 2023-04-10

## 2023-04-10 RX ADMIN — GADOTERIDOL 20 ML: 279.3 INJECTION, SOLUTION INTRAVENOUS at 12:02

## 2023-04-17 ENCOUNTER — OFFICE VISIT (OUTPATIENT)
Dept: FAMILY MEDICINE CLINIC | Facility: CLINIC | Age: 63
End: 2023-04-17
Payer: COMMERCIAL

## 2023-04-17 VITALS
BODY MASS INDEX: 39.8 KG/M2 | DIASTOLIC BLOOD PRESSURE: 82 MMHG | RESPIRATION RATE: 18 BRPM | OXYGEN SATURATION: 98 % | HEART RATE: 94 BPM | WEIGHT: 210.8 LBS | HEIGHT: 61 IN | SYSTOLIC BLOOD PRESSURE: 134 MMHG

## 2023-04-17 DIAGNOSIS — J18.9 PNEUMONIA OF RIGHT MIDDLE LOBE DUE TO INFECTIOUS ORGANISM: Primary | ICD-10-CM

## 2023-04-17 RX ORDER — TEMAZEPAM 30 MG/1
CAPSULE ORAL
COMMUNITY
Start: 2023-03-08

## 2023-04-17 NOTE — PROGRESS NOTES
"Chief Complaint  Pneumonia (Following up from [facility] ER on 3/18/2023)    Subjective    Sindy Martin presents to [facility]  History of Present Illness    The patient presented to the emergency room approximately 1 month ago and was diagnosed with bacterial pneumonia of her right lung. She was told she did not have COVID-19 pneumonia. Prior to her presentation, she was febrile with a temperature of 102 degrees Fahrenheit. She was wheezing. She was given intravenous doxycycline and stayed at the hospital for a few hours. She was sent a prescription for antibiotics to take at home. She was severely ill for approximately 2 weeks. She was sleeping in a recliner so she could breathe during her recovery. She developed a productive cough after 1 week of recovery. She is frustrated with the rate of her recovery. She is still experiencing a cough due to environmental irritants, but it is not severe. Her appetite has returned to normal. She is sleeping well. She is no longer using inhalers.    The patient has seasonal allergies. She was prescribed Stahist while she had pneumonia. She had a recurrence of sinus symptoms at this time.    The patient has had her COVID-19 and influenza vaccinations.    The patient saw her oncologist in 02/2023. She had blood work, which showed she was anemic. She was prescribed an iron supplement. She will follow up with her oncologist in 06/2023. She finds that her nighttime bilateral lower extremity achiness has improved since starting iron supplements.    The patient has an appointment with her gastroenterologist on 04/20/2023. She will inquire about a possible etiology for her anemia. She had an EGD in 04/2022. She is due for a colonoscopy.    Objective   Vital Signs:  /82   Pulse 94   Resp 18   Ht 154.9 cm (61\")   Wt 95.6 kg (210 lb 12.8 oz)   SpO2 98%   BMI 39.83 kg/m²   Estimated body mass index is 39.83 kg/m² as calculated from the following:    " "Height as of this encounter: 154.9 cm (61\").    Weight as of this encounter: 95.6 kg (210 lb 12.8 oz).       Class 2 Severe Obesity (BMI >=35 and <=39.9). Obesity-related health conditions include the following: hypertension, coronary heart disease, dyslipidemias and osteoarthritis. Obesity is unchanged. BMI is is above average; BMI management plan is completed. We discussed low calorie, low carb based diet program, portion control and increasing exercise.      Physical Exam  Constitutional:       Appearance: Normal appearance. She is well-developed and normal weight.   HENT:      Head: Normocephalic and atraumatic.      Right Ear: Tympanic membrane, ear canal and external ear normal.      Left Ear: Tympanic membrane, ear canal and external ear normal.      Nose: Nose normal.      Mouth/Throat:      Mouth: Mucous membranes are moist.      Pharynx: Oropharynx is clear. No oropharyngeal exudate.   Eyes:      Extraocular Movements: Extraocular movements intact.      Conjunctiva/sclera: Conjunctivae normal.      Pupils: Pupils are equal, round, and reactive to light.   Cardiovascular:      Rate and Rhythm: Normal rate and regular rhythm.      Pulses: Normal pulses.      Heart sounds: Normal heart sounds.   Pulmonary:      Effort: Pulmonary effort is normal.      Breath sounds: Normal breath sounds.   Abdominal:      General: Bowel sounds are normal.      Palpations: Abdomen is soft.   Musculoskeletal:         General: Normal range of motion.      Cervical back: Normal range of motion and neck supple.   Skin:     General: Skin is warm and dry.   Neurological:      General: No focal deficit present.      Mental Status: She is alert and oriented to person, place, and time. Mental status is at baseline.   Psychiatric:         Mood and Affect: Mood normal.         Behavior: Behavior normal.         Thought Content: Thought content normal.         Judgment: Judgment normal.          Assessment and Plan     1. Community " acquired pneumonia  - The patient is a 62-year-old white female who about 2 weeks ago went to the emergency room and was found to have right middle lobe pneumonia. She was treated with antibiotics and cough syrup, and ordered to bedrest. She had fevers up to 102 degrees Fahrenheitt and some shaking chills. The patient took about 1 week to get better, but she was finally better and by 10 days out, she felt much better. She is about 2 weeks out from that visit and is doing quite well right now. Her exam today was normal and I hear no residual rales on either side and no wheezing. She is not coughing and she is afebrile. We will continue to monitor her, but I think she is through this bout and we will continue to be available and to support her in any way that we can.          Follow Up Return in about 4 weeks (around 5/15/2023), or if symptoms worsen or fail to improve, for Recheck-if not better in 2-3 weeks we need to see..  Patient was given instructions and counseling regarding her condition or for health maintenance advice. Please see specific information pulled into the AVS if appropriate.     Transcribed from ambient dictation for Rubens Cali MD by Em Sullvian.  04/17/23   12:45 EDT    Patient or patient representative verbalized consent to the visit recording.  I have personally performed the services described in this document as transcribed by the above individual, and it is both accurate and complete.  Rubens Cali MD  4/24/2023  16:38 EDT

## 2023-04-20 ENCOUNTER — OFFICE (OUTPATIENT)
Dept: URBAN - METROPOLITAN AREA CLINIC 64 | Facility: CLINIC | Age: 63
End: 2023-04-20

## 2023-04-20 VITALS
SYSTOLIC BLOOD PRESSURE: 150 MMHG | HEIGHT: 61 IN | WEIGHT: 210 LBS | HEART RATE: 84 BPM | DIASTOLIC BLOOD PRESSURE: 85 MMHG

## 2023-04-20 DIAGNOSIS — D50.9 IRON DEFICIENCY ANEMIA, UNSPECIFIED: ICD-10-CM

## 2023-04-20 DIAGNOSIS — Z79.82 LONG TERM (CURRENT) USE OF ASPIRIN: ICD-10-CM

## 2023-04-20 DIAGNOSIS — K21.9 GASTRO-ESOPHAGEAL REFLUX DISEASE WITHOUT ESOPHAGITIS: ICD-10-CM

## 2023-04-20 PROCEDURE — 99214 OFFICE O/P EST MOD 30 MIN: CPT | Performed by: INTERNAL MEDICINE

## 2023-04-25 ENCOUNTER — HOSPITAL ENCOUNTER (OUTPATIENT)
Dept: MRI IMAGING | Facility: HOSPITAL | Age: 63
Discharge: HOME | End: 2023-04-25
Admitting: INTERNAL MEDICINE
Payer: COMMERCIAL

## 2023-04-25 DIAGNOSIS — Z12.9 CANCER SCREENING: ICD-10-CM

## 2023-04-25 DIAGNOSIS — Z15.01 BRCA2 GENE MUTATION POSITIVE: ICD-10-CM

## 2023-04-25 DIAGNOSIS — Z15.09 BRCA2 GENE MUTATION POSITIVE: ICD-10-CM

## 2023-04-25 DIAGNOSIS — C50.911 MALIGNANT NEOPLASM OF RIGHT FEMALE BREAST, UNSPECIFIED ESTROGEN RECEPTOR STATUS, UNSPECIFIED SITE OF BREAST: ICD-10-CM

## 2023-04-25 LAB
CREAT BLDA-MCNC: 0.9 MG/DL (ref 0.6–1.3)
EGFRCR SERPLBLD CKD-EPI 2021: 71.5 ML/MIN/1.73

## 2023-04-25 PROCEDURE — 82565 ASSAY OF CREATININE: CPT

## 2023-04-25 PROCEDURE — 25010000002 GADOTERIDOL PER 1 ML: Performed by: INTERNAL MEDICINE

## 2023-04-25 PROCEDURE — 74183 MRI ABD W/O CNTR FLWD CNTR: CPT

## 2023-04-25 PROCEDURE — A9579 GAD-BASE MR CONTRAST NOS,1ML: HCPCS | Performed by: INTERNAL MEDICINE

## 2023-04-25 RX ADMIN — GADOTERIDOL 20 ML: 279.3 INJECTION, SOLUTION INTRAVENOUS at 10:10

## 2023-05-15 RX ORDER — ATORVASTATIN CALCIUM 20 MG/1
TABLET, FILM COATED ORAL
Qty: 90 TABLET | Refills: 1 | Status: SHIPPED | OUTPATIENT
Start: 2023-05-15

## 2023-06-13 NOTE — PROGRESS NOTES
Hematology/Oncology Outpatient Follow Up    PATIENT NAME:Sindy Martin  :1960  MRN: [id number]  PRIMARY CARE PHYSICIAN: Rubens Cali MD  REFERRING PHYSICIAN: No ref. provider found    Chief Complaint   Patient presents with    Follow-up     Malignant neoplasm of right female breast, unspecified estrogen receptor status, unspecified site of breast        HISTORY OF PRESENT ILLNESS:     This is a 62-year-old female who was originally seen on 14 after she presented with early stage right breast cancer.  Please refer to the details of my notes for more information.   She had an abnormal mammogram in May 2014.    6/10/14 - She underwent ultrasound guided localization of the right breast mass with sentinel lymph node injection, right lumpectomy.  Pathology revealed one sentinel lymph node negative for malignancy.  Tumor was strongly positive for ER, IN and HER-2/hiro was negative.  Tumor measured 1 cm in greatest dimension.  It was a grade III disease.  All the margins were negative.  Pathologic stage is [id number].    14 - Patient’s tumor was sent for Oncotype DX assay.  This returned with a recurrent score and validation study, she has a 12% chance of distant recurrence despite five years of Tamoxifen over the next 10 years.  On the validation study her ER was positive, IN was positive and HER-2/hiro was negative.  14 - Patient had BRCA 1 and 2 mutational analysis which returned with BRCA 2 deleterious mutation involving [id number] on the BRCA 2 sequencing gene.    2014 - She had FSH and serum estradiol levels, which are noted to be in the postmenopausal range.    14 - Patient was initiated on adjuvant chemotherapy with Taxotere and Cytoxan along with Neulasta.     10/6/14 - Patient completed 4th cycle of chemotherapy with Cytoxan and Taxotere.   Patient was seen by Dr. Buck who performed excisional biopsy of a mole on the left forearm and this was benign from history.     14 -  Patient completed whole right breast radiation under the direction of Dr. Mcgrath.  She received total dose of 4256 cGy.  Patient has been seen by Dr. Son who will perform her complete hysterectomy due to BRCA positivity after the first of the year.      12/23/14 - Patient had a bone density, which was essentially normal.    December 2014 - Patient was initiated on Arimidex 1 mg every day as well as Os-Quinton D.   April 2015 - Patient underwent total robotic hysterectomy with bilateral adnexectomy.  Pathology was benign.  I have requested for official report of the above procedure and path reports.  Patient has been seen by Dr. Son on 4/29/15.    5/19/15 - Port catheter was removed.   5/28/15 - Bilateral diagnostic mammogram which was essentially unremarkable except that patient has heterogenously dense breasts.  6/22/15 - Patient was seen by Dr. Guo.  Follow up in one year was recommended.    Fasting lipid profile done which showed total cholesterol of 234, HDL of 42, triglyceride 237, elevated, and LDH was 145, also elevated.  BUN 20, creatinine 0.87 and LFTs were essentially unremarkable.  WBC 6.7, hemoglobin 12.4 and platelet count 197,000.    12/5/15 - Fasting lipid profile showed total cholesterol 133, HDL 50 and LDL 67, triglyceride 81.  BUN 22, creatinine 0.7.  2/24/16 - Bilateral breast MRI which showed post-op scar change on the right breast, but no MR finding to suggest malignancy.  She has a 4 mm enhancing lesion in the lower right breast at the inframammary fold.  Patient’s breast exam and skin evaluation was essentially unremarkable at the site where the 4 mm enhancing lesion was noted.  Patient also had no specific complaints.  3/22/16 - Anemia work up with reticulocyte count (N), B12 (N) 521, ferritin (N) 43, folate 20, haptoglobin 184, , BUN 11, creatinine 11.8.  SPEP with immunofixation assay showed IgG kappa monoclonal protein with M spike of 0.2 gm/dL.  Erythropoietin level (N) 22  [range 2-18].    3/31/16 - Quantitative immunoglobulins for IgA, IgG and IgM were normal.  Serum free light chains were essentially unremarkable.    4/5/16 - 24-hour urine electrophoresis which did not show any evidence of monoclonal gammopathy.   6/10/16 - Bilateral mammogram was essentially normal.  Follow up in one year was recommended.    6/20/16 - Serum transferrin receptor assay returned with high level at 5.2 [range 1.9-4.9].    6/27/16 - Urinalysis showed trace blood.  8/1/16 - Patient was seen by Dr. Torres and had EGD and colonoscopy.  EGD showed hiatal hernia, moderate to severe gastroduodenitis, but no ulcers seen.  No abnormalities seen on her colonoscopy.   9/12/16 - Chemistry panel:  BUN 14, creatinine 0.8, total protein (L) 5.8, alkaline phosphatase 94 [range 32-91].  Quantitative immunoglobulins for IgA (N) 101, IgG slightly low at 530 [range 600-1500], IgM (N) 195.  SPEP with immunofixation assay was essentially negative for any significant monoclonal protein.   9/19/16 - Urinalysis was negative for hematuria.   10/12/16 - Bone scan showed degenerative changes in the knees, otherwise negative.  No signs of metastatic disease.    10/10 & 10/17/16 - Patient received IV Injectafer.    1/16/17 - SPEP with immunofixation assay which did not show any evidence of monoclonal protein.   2/9/17 - Bilateral breast MRI showed no evidence of malignancy, but she had a skin lesion noted on the left breast.  Patient has since then seen Dr. Buck.  She has a follow up appointment in six months.    6/26/17 - Patient had bilateral diagnostic mammogram with tomosynthesis.  This revealed new linear pleomorphic microcalcifications in the medial right breast.  The left breast was unchanged.  Stereotactic biopsy was recommended.    7/12/17 - Patient underwent stereotactic biopsy of the right breast calcifications.  Pathology showed fibrous mastopathy with coarse stromal microcalcifications suggestive of previous procedure.   Negative for carcinoma in situ and invasive carcinoma.  The result was concurrent with the image findings.  Follow up in one year was recommended.    9/11/17 - Since her last visit patient had labs including SPEP with immunofixation assay which showed a free lambda light chain identified.  IgA was 83.  IgG was 460.  IgM was 158.  BUN 17.  Creatinine 0.6.  Alkaline phosphatase 101.  Serum calcium 9.2.  CMP was normal.    9/16/17 - DEXA scan was normal.    2/15/18 - Bilateral breast MRI with stable post-op changes seen at the right breast, but no mass was seen in either the right or left breast.  Bilateral breast mammogram is due June 2018.   3/12/18 - CBC:  WBC 7.9, hemoglobin 13, platelet count 183,000.  Chemistry panel:  BUN 19.  Creatinine 0.7.  Alkaline phosphatase 92.  IgA 511.  IgG low at 518.  IgM normal 191.  Kappa lambda free light chains were normal.  SPEP with LAZARO was normal.    7/30/18 - Bilateral diagnostic mammogram with 3D with no mammographic evidence of malignancy.    9/18/18 - CT scan of the abdomen and pelvis which showed post-op changes, but no masses were identified in the abdomen.    2/4/19 - SPEP with LAZARO which did not show any monoclonal protein.  Total cholesterol was 116, triglycerides 69, LDL 66, HDL 43.  Chemistry:  BUN 17, creatinine 0.9, liver function tests are normal.    2/22/2019-patient had bilateral breast MRI, there was no MR evidence of malignancy in either breast.  8/29/19-patient had a DEXA scan, this was normal bone mineral density  8/29/2019-patient had bilateral mammogram which showed heterogeneously dense breast tissue.  Scar tissue was noted on the right breast consistent with previous surgery.  Follow-up in 1 year was recommended  2/19/2020 patient had bilateral breast MRI which was normal, follow-up in 1 year was recommended  She is here today for routine follow-up visit.  Patient does not have any breast specific complaints today  March 2, 2021 patient had bilateral  breast MRI which did not show any MR evidence of malignancy  March 6, 2021 she had MRI of the abdomen which basically revealed mild fatty and atrophic pancreas no solid or cystic pancreatic mass was seen.  Patient to follow-up with Dr. Torres for EUS which would be due March 2022 February 2023: Anemia labs showed iron deficiency with a ferritin of 11.2.  She also had decreased iron saturation at 5% CMP was unremarkable except for slightly elevated blood sugar 115, LDH was normal at 204, folate was more than 20, haptoglobin was 237, reticulocyte count was normal at 1.35 B12 was 573 SPEP with LAZARO did not show any monoclonal protein. Urinalysis was negative for hematuria.  She was recommended to receive iron infusion and GI referral  4/20/2023: Patient was seen by Dr. Mayer who recommended EGD and colonoscopy    Past Medical History:   Diagnosis Date    Depression     Hearing loss     Hypertension     Hypothyroidism        Past Surgical History:   Procedure Laterality Date    APPENDECTOMY      BREAST BIOPSY      BREAST LUMPECTOMY      CARPAL TUNNEL RELEASE      CHOLECYSTECTOMY      COLONOSCOPY  2018 (?)    HYSTERECTOMY      LYMPH NODE BIOPSY  2014    [place] node under left arm    OOPHORECTOMY      UPPER ENDOSCOPIC ULTRASOUND W/ FNA N/A 04/28/2022    Procedure: EUS WITH BIOPSY;  Surgeon: Roldan Mayer MD;  Location: [facility] ENDOSCOPY;  Service: Gastroenterology;  Laterality: N/A;  HIATEL HERNIA, GASTRIC POLYP         Current Outpatient Medications:     ALOE VERA JUICE PO, Take 2 oz by mouth 2 (Two) Times a Day., Disp: , Rfl:     aspirin 81 MG tablet, Take 1 tablet by mouth Daily., Disp: , Rfl:     atorvastatin (LIPITOR) 20 MG tablet, TAKE 1 TABLET DAILY, Disp: 90 tablet, Rfl: 1    Biotin 5 MG capsule, Take  by mouth., Disp: , Rfl:     buPROPion XL (WELLBUTRIN XL) 150 MG 24 hr tablet, Take 1 tablet by mouth Every Morning., Disp: , Rfl:     escitalopram (LEXAPRO) 20 MG tablet, Take 1 tablet by mouth Daily., Disp: ,  Rfl:     exemestane (AROMASIN) 25 MG chemo tablet, TAKE 1 TABLET DAILY, Disp: 90 tablet, Rfl: 3    loratadine (CLARITIN) 10 MG tablet, Take 1 tablet by mouth Daily., Disp: , Rfl:     Magnesium 250 MG tablet, Take 2 tablets by mouth Daily., Disp: , Rfl:     meloxicam (MOBIC) 15 MG tablet, TAKE 1 TABLET BY MOUTH DAILY, Disp: 90 tablet, Rfl: 0    metFORMIN (GLUCOPHAGE) 500 MG tablet, TAKE 2 TABLETS TWICE A DAY, Disp: 360 tablet, Rfl: 3    multivitamin with minerals tablet tablet, Take 1 tablet by mouth Daily., Disp: , Rfl:     NON FORMULARY, OSTEOBIFLEX 1 TAB DAILY, Disp: , Rfl:     pantoprazole (PROTONIX) 40 MG EC tablet, Take 1 tablet by mouth Daily., Disp: , Rfl:     polycarbophil 625 MG tablet tablet, Take 2 tablets by mouth Daily., Disp: , Rfl:     Probiotic Product (PROBIOTIC BLEND PO), Take 1 tablet by mouth Daily., Disp: , Rfl:     Synthroid 50 MCG tablet, TAKE 1 TABLET DAILY, Disp: 90 tablet, Rfl: 1    temazepam (RESTORIL) 30 MG capsule, TAKE 1 CAPSULE BY MOUTH AT NIGHT AS NEEDED FOR SLEEP OR ANXIETY, Disp: , Rfl:     Allergies   Allergen Reactions    Codeine Nausea And Vomiting    Ibuprofen Anaphylaxis       Family History   Problem Relation Age of Onset    Arthritis Mother     Gout Mother     Parkinsonism Mother     Miscarriages / Stillbirths Mother         Miscarriage of second child    Lung cancer Father     Arthritis Father     Cancer Father     Depression Father     Mental illness Father     Lung cancer Maternal Aunt     Cancer Maternal Aunt     Uterine cancer Paternal Aunt 60    Cancer Paternal Aunt     Prostate cancer Paternal Uncle     Cancer Paternal Uncle     Melanoma Paternal Grandmother     Cancer Paternal Grandmother     Depression Paternal Grandmother     Liver disease Paternal Grandmother     Mental illness Paternal Grandmother     Prostate cancer Paternal Cousin     Melanoma Paternal Cousin     Esophageal cancer Paternal Cousin     Thyroid cancer Maternal Cousin 40    Esophageal cancer  Maternal Cousin     Ovarian cancer Maternal Cousin 50    Heart disease Maternal Grandfather     Diabetes Maternal Grandmother     Alcohol abuse Maternal Uncle     COPD Maternal Uncle     Heart disease Maternal Uncle     Heart disease Paternal Uncle     Liver disease Maternal Uncle        Cancer-related family history includes Cancer in her father, maternal aunt, paternal aunt, paternal grandmother, and paternal uncle; Esophageal cancer in her maternal cousin and paternal cousin; Lung cancer in her father and maternal aunt; Melanoma in her paternal cousin and paternal grandmother; Ovarian cancer (age of onset: 50) in her maternal cousin; Prostate cancer in her paternal cousin and paternal uncle; Thyroid cancer (age of onset: 40) in her maternal cousin; Uterine cancer (age of onset: 60) in her paternal aunt.    Social History     Tobacco Use    Smoking status: Never    Smokeless tobacco: Never   Vaping Use    Vaping Use: Never used   Substance Use Topics    Alcohol use: Not Currently     Comment: Very sporadically.  Not even once a month.  A glass of wine    Drug use: Never     I have reviewed and confirmed the accuracy of the patient's history: Chief complaint, HPI, ROS, and Subjective as entered by the MA/LPN/RN. Munira Finney MD 06/15/23      SUBJECTIVE:    Patient denies any new issues.  She has kept her follow-up appointments with her physicians    Tolerating oral iron supplementation very well without any significant issues          REVIEW OF SYSTEMS:    Review of Systems   Constitutional:  Negative for chills and fever.   HENT:  Negative for ear pain, mouth sores, nosebleeds and sore throat.    Eyes:  Negative for photophobia and visual disturbance.   Respiratory:  Negative for wheezing and stridor.    Cardiovascular:  Negative for chest pain and palpitations.   Gastrointestinal:  Negative for abdominal pain, diarrhea, nausea and vomiting.   Endocrine: Negative for cold intolerance and heat  "intolerance.   Genitourinary:  Negative for dysuria and hematuria.   Musculoskeletal:  Negative for joint swelling and neck stiffness.   Skin:  Negative for color change and rash.   Neurological:  Negative for seizures and syncope.   Hematological:  Negative for adenopathy.        No obvious bleeding   Psychiatric/Behavioral:  Negative for agitation, confusion and hallucinations.    ROS as documented above    OBJECTIVE:    Vitals:    06/15/23 1200   BP: 135/80   Pulse: 102   Resp: 20   Temp: 98 °F (36.7 °C)   TempSrc: Infrared   SpO2: 95%   Weight: 92.5 kg (204 lb)   Height: 154.9 cm (61\")   PainSc: 0-No pain       ECOG    (1) Restricted in physically strenuous activity, ambulatory and able to do work of light nature    Physical Exam   Constitutional: She is oriented to person, place, and time. No distress.   HENT:   Head: Normocephalic and atraumatic.   Eyes: Conjunctivae are normal. Right eye exhibits no discharge. Left eye exhibits no discharge. No scleral icterus.   Neck: No thyromegaly present.   Cardiovascular: Normal rate, regular rhythm and normal heart sounds. Exam reveals no gallop and no friction rub.   Pulmonary/Chest: Effort normal. No stridor. No respiratory distress. She has no wheezes. Right breast exhibits no inverted nipple, no mass, no nipple discharge, no skin change and no tenderness. Left breast exhibits no inverted nipple, no mass, no nipple discharge, no skin change and no tenderness. No breast bleeding. Breasts are symmetrical.   Bilateral breast exam is negative  Bilateral axillary exam is negative   Abdominal: Soft. Bowel sounds are normal. She exhibits no mass. There is no abdominal tenderness. There is no rebound and no guarding.   Genitourinary: No breast bleeding.   Musculoskeletal: Normal range of motion. No tenderness.   Lymphadenopathy:     She has no cervical adenopathy.   Neurological: She is alert and oriented to person, place, and time. She exhibits normal muscle tone.   Skin: " Skin is warm. No rash noted. She is not diaphoretic. No erythema.   Psychiatric: Her behavior is normal.   Nursing note and vitals reviewed.  Physical exam as documented above    I have reexamined the patient and the results are consistent with the previously documented exam. Munira Finney MD      RECENT LABS    WBC   Date Value Ref Range Status   06/15/2023 7.42 3.40 - 10.80 10*3/mm3 Final     RBC   Date Value Ref Range Status   06/15/2023 4.77 3.77 - 5.28 10*6/mm3 Final     Hemoglobin   Date Value Ref Range Status   06/15/2023 12.9 12.0 - 15.9 g/dL Final     Hematocrit   Date Value Ref Range Status   06/15/2023 40.1 34.0 - 46.6 % Final     MCV   Date Value Ref Range Status   06/15/2023 84.1 79.0 - 97.0 fL Final     MCH   Date Value Ref Range Status   06/15/2023 27.0 26.6 - 33.0 pg Final     MCHC   Date Value Ref Range Status   06/15/2023 32.2 31.5 - 35.7 g/dL Final     RDW   Date Value Ref Range Status   06/15/2023 16.6 (H) 12.3 - 15.4 % Final     RDW-SD   Date Value Ref Range Status   06/15/2023 51.4 37.0 - 54.0 fl Final     MPV   Date Value Ref Range Status   06/15/2023 10.9 6.0 - 12.0 fL Final     Platelets   Date Value Ref Range Status   06/15/2023 194 140 - 450 10*3/mm3 Final     Neutrophil %   Date Value Ref Range Status   06/15/2023 46.7 42.7 - 76.0 % Final     Lymphocyte %   Date Value Ref Range Status   06/15/2023 38.4 19.6 - 45.3 % Final     Monocyte %   Date Value Ref Range Status   06/15/2023 12.5 (H) 5.0 - 12.0 % Final     Eosinophil %   Date Value Ref Range Status   06/15/2023 2.3 0.3 - 6.2 % Final     Basophil %   Date Value Ref Range Status   06/15/2023 0.1 0.0 - 1.5 % Final     Immature Grans %   Date Value Ref Range Status   03/10/2021 0.2 0.0 - 0.5 % Final     Neutrophils, Absolute   Date Value Ref Range Status   06/15/2023 3.46 1.70 - 7.00 10*3/mm3 Final     Lymphocytes, Absolute   Date Value Ref Range Status   06/15/2023 2.85 0.70 - 3.10 10*3/mm3 Final     Monocytes, Absolute   Date  Value Ref Range Status   06/15/2023 0.93 (H) 0.10 - 0.90 10*3/mm3 Final     Eosinophils, Absolute   Date Value Ref Range Status   06/15/2023 0.17 0.00 - 0.40 10*3/mm3 Final     Basophils, Absolute   Date Value Ref Range Status   06/15/2023 0.01 0.00 - 0.20 10*3/mm3 Final     Immature Grans, Absolute   Date Value Ref Range Status   03/10/2021 0.02 0.00 - 0.05 10*3/mm3 Final     nRBC   Date Value Ref Range Status   03/18/2023 0.1 0.0 - 0.2 /100 WBC Final       Lab Results   Component Value Date    GLUCOSE 135 (H) 03/18/2023    BUN 13 03/18/2023    CREATININE 0.90 04/25/2023    EGFRIFNONA 76 02/14/2022    EGFRIFAFRI 85 11/10/2016    BCR 19.4 03/18/2023    K 3.9 03/18/2023    CO2 21.0 (L) 03/18/2023    CALCIUM 8.9 03/18/2023    PROTENTOTREF 6.2 02/16/2023    ALBUMIN 4.1 03/18/2023    LABIL2 1.4 02/16/2023    AST 24 03/18/2023    ALT 25 03/18/2023           ASSESSMENT:    T1b N0 M0 invasive ductal carcinoma, ER positive, VT positive, HER-2/hiro negative, grade III tumor.  Ongoing surveillance  Status post right lumpectomy with sentinel lymph node biopsy.  Reviewed  Status post adjuvant chemotherapy with Cytoxan and Taxotere for four cycles.    BRCA 2 sequencing gene with 3036 deletion 4 deleterious mutation.  Ongoing surveillance for high risk mutation  Status post right breast radiation.   Recurrent anemia: Work-up revealed iron deficiency.  Referred to Dr. Mayer April 2023.  Plan for EGD colonoscopy coming up next week.  Patient will continue oral iron supplementation.  She has had a hemoglobin response  Postmenopausal hormonal status.   Status post robotic complete hysterectomy and adnexectomy with benign findings.    Hypercholesterolemia on treatment.  Lipid panel 2/4/19 was normal.   Arimidex initiated December 2014, discontinued October 2016 due to side effects.  Aromasin initiated November 2016.  Remains on Aromasin.  Continue Aromasin  Iron deficiency anemia, resolved.   Monoclonal gammopathy of unknown  significance.  Has been zoraida;l for past 2 years. Will discontinue checking.  Bilateral hip pain.  Resolved        PLANS:    Continue iron supplementation.  Refills given       Continue Aromasin, Os-Quinton D till November 2024    MRI  breast be due April 2024.    Bilateral screening mammogram will be due October 2023.  We will order today    Abdominal MRI completed 4/25/2023: Normal.  Will be done every 2 years    Patient will  be due for bone density in September 2023.  Order at next visit    Patient is scheduled for colonoscopy  March 2023.  She has an appointment coming up with Dr. Mayer    Follow-up with dermatologist once a year: Reviewed    Dermatology apt with  Dr Buck once a year next due in October 2023    Follow up with Dr. Mayer for EUS for pancreas due March 2024.     Monoclonal labs have been normal past 2 years therefore will discontinue checking at this time.    She will continue monthly breast self exam and call for lumps, nipple discharge, skin discoloration. A CMP will be done today.        She will follow up with Dr. Mayer for pancreatic cancer screening.  We discussed use of pancreatic MRI alternating with EUS.          Patient is five years out from her breast cancer diagnosis.  I did discuss prophylactic mastectomies.  At this time patient wants to continue with breast conservation.  I will continue Aromasin for her for breast cancer prevention.  She has not had any significant toxicities on this treatment.             I have reviewed labs results, imaging, vitals, and medications with the patient today. Follow up in 6 months with me or earlier as needed for problems         Patient verbalized understanding and is in agreement of the above plan.        I spent 40 total minutes, face-to-face, caring for Sindy today. 90% of this time involved counseling and/or coordination of care as documented within this note.

## 2023-06-15 ENCOUNTER — OFFICE VISIT (OUTPATIENT)
Dept: ONCOLOGY | Facility: CLINIC | Age: 63
End: 2023-06-15
Payer: COMMERCIAL

## 2023-06-15 ENCOUNTER — LAB (OUTPATIENT)
Dept: LAB | Facility: HOSPITAL | Age: 63
End: 2023-06-15
Payer: COMMERCIAL

## 2023-06-15 VITALS
OXYGEN SATURATION: 95 % | HEIGHT: 61 IN | WEIGHT: 204 LBS | DIASTOLIC BLOOD PRESSURE: 80 MMHG | HEART RATE: 102 BPM | BODY MASS INDEX: 38.51 KG/M2 | SYSTOLIC BLOOD PRESSURE: 135 MMHG | TEMPERATURE: 98 F | RESPIRATION RATE: 20 BRPM

## 2023-06-15 DIAGNOSIS — C50.911 MALIGNANT NEOPLASM OF RIGHT FEMALE BREAST, UNSPECIFIED ESTROGEN RECEPTOR STATUS, UNSPECIFIED SITE OF BREAST: Primary | ICD-10-CM

## 2023-06-15 LAB
BASOPHILS # BLD AUTO: 0.01 10*3/MM3 (ref 0–0.2)
BASOPHILS NFR BLD AUTO: 0.1 % (ref 0–1.5)
DEPRECATED RDW RBC AUTO: 51.4 FL (ref 37–54)
EOSINOPHIL # BLD AUTO: 0.17 10*3/MM3 (ref 0–0.4)
EOSINOPHIL NFR BLD AUTO: 2.3 % (ref 0.3–6.2)
ERYTHROCYTE [DISTWIDTH] IN BLOOD BY AUTOMATED COUNT: 16.6 % (ref 12.3–15.4)
HCT VFR BLD AUTO: 40.1 % (ref 34–46.6)
HGB BLD-MCNC: 12.9 G/DL (ref 12–15.9)
HOLD SPECIMEN: NORMAL
LYMPHOCYTES # BLD AUTO: 2.85 10*3/MM3 (ref 0.7–3.1)
LYMPHOCYTES NFR BLD AUTO: 38.4 % (ref 19.6–45.3)
MCH RBC QN AUTO: 27 PG (ref 26.6–33)
MCHC RBC AUTO-ENTMCNC: 32.2 G/DL (ref 31.5–35.7)
MCV RBC AUTO: 84.1 FL (ref 79–97)
MONOCYTES # BLD AUTO: 0.93 10*3/MM3 (ref 0.1–0.9)
MONOCYTES NFR BLD AUTO: 12.5 % (ref 5–12)
NEUTROPHILS NFR BLD AUTO: 3.46 10*3/MM3 (ref 1.7–7)
NEUTROPHILS NFR BLD AUTO: 46.7 % (ref 42.7–76)
PLATELET # BLD AUTO: 194 10*3/MM3 (ref 140–450)
PMV BLD AUTO: 10.9 FL (ref 6–12)
RBC # BLD AUTO: 4.77 10*6/MM3 (ref 3.77–5.28)
WBC NRBC COR # BLD: 7.42 10*3/MM3 (ref 3.4–10.8)

## 2023-06-15 PROCEDURE — 36415 COLL VENOUS BLD VENIPUNCTURE: CPT

## 2023-06-15 PROCEDURE — 85025 COMPLETE CBC W/AUTO DIFF WBC: CPT

## 2023-06-15 RX ORDER — FERROUS SULFATE 325(65) MG
325 TABLET ORAL
Qty: 30 TABLET | Refills: 4 | Status: SHIPPED | OUTPATIENT
Start: 2023-06-15

## 2023-06-22 ENCOUNTER — OFFICE (OUTPATIENT)
Dept: URBAN - METROPOLITAN AREA PATHOLOGY 4 | Facility: PATHOLOGY | Age: 63
End: 2023-06-22

## 2023-06-22 ENCOUNTER — ON CAMPUS - OUTPATIENT (OUTPATIENT)
Dept: URBAN - METROPOLITAN AREA HOSPITAL 2 | Facility: HOSPITAL | Age: 63
End: 2023-06-22

## 2023-06-22 VITALS
DIASTOLIC BLOOD PRESSURE: 80 MMHG | DIASTOLIC BLOOD PRESSURE: 76 MMHG | HEIGHT: 61 IN | DIASTOLIC BLOOD PRESSURE: 71 MMHG | SYSTOLIC BLOOD PRESSURE: 135 MMHG | RESPIRATION RATE: 17 BRPM | WEIGHT: 201 LBS | SYSTOLIC BLOOD PRESSURE: 128 MMHG | SYSTOLIC BLOOD PRESSURE: 158 MMHG | OXYGEN SATURATION: 99 % | DIASTOLIC BLOOD PRESSURE: 87 MMHG | HEART RATE: 98 BPM | HEART RATE: 102 BPM | DIASTOLIC BLOOD PRESSURE: 78 MMHG | HEART RATE: 93 BPM | RESPIRATION RATE: 14 BRPM | DIASTOLIC BLOOD PRESSURE: 99 MMHG | SYSTOLIC BLOOD PRESSURE: 111 MMHG | SYSTOLIC BLOOD PRESSURE: 126 MMHG | DIASTOLIC BLOOD PRESSURE: 90 MMHG | OXYGEN SATURATION: 100 % | DIASTOLIC BLOOD PRESSURE: 75 MMHG | OXYGEN SATURATION: 97 % | DIASTOLIC BLOOD PRESSURE: 77 MMHG | HEART RATE: 91 BPM | SYSTOLIC BLOOD PRESSURE: 130 MMHG | HEART RATE: 92 BPM | RESPIRATION RATE: 19 BRPM | SYSTOLIC BLOOD PRESSURE: 124 MMHG | RESPIRATION RATE: 16 BRPM | SYSTOLIC BLOOD PRESSURE: 144 MMHG | RESPIRATION RATE: 18 BRPM | SYSTOLIC BLOOD PRESSURE: 136 MMHG | DIASTOLIC BLOOD PRESSURE: 95 MMHG | TEMPERATURE: 97.3 F | HEART RATE: 88 BPM | SYSTOLIC BLOOD PRESSURE: 114 MMHG

## 2023-06-22 DIAGNOSIS — K21.00 GASTRO-ESOPHAGEAL REFLUX DISEASE WITH ESOPHAGITIS, WITHOUT B: ICD-10-CM

## 2023-06-22 DIAGNOSIS — K29.50 UNSPECIFIED CHRONIC GASTRITIS WITHOUT BLEEDING: ICD-10-CM

## 2023-06-22 DIAGNOSIS — D50.9 IRON DEFICIENCY ANEMIA, UNSPECIFIED: ICD-10-CM

## 2023-06-22 DIAGNOSIS — K63.5 POLYP OF COLON: ICD-10-CM

## 2023-06-22 PROBLEM — K31.89 OTHER DISEASES OF STOMACH AND DUODENUM: Status: ACTIVE | Noted: 2023-06-22

## 2023-06-22 PROBLEM — K22.89 OTHER SPECIFIED DISEASE OF ESOPHAGUS: Status: ACTIVE | Noted: 2023-06-22

## 2023-06-22 LAB
GI HISTOLOGY: A. SELECT: (no result)
GI HISTOLOGY: B. SELECT: (no result)
GI HISTOLOGY: C. UNSPECIFIED: (no result)
GI HISTOLOGY: PDF REPORT: (no result)

## 2023-06-22 PROCEDURE — 88305 TISSUE EXAM BY PATHOLOGIST: CPT | Performed by: INTERNAL MEDICINE

## 2023-06-22 PROCEDURE — 45385 COLONOSCOPY W/LESION REMOVAL: CPT | Performed by: INTERNAL MEDICINE

## 2023-06-22 PROCEDURE — 43239 EGD BIOPSY SINGLE/MULTIPLE: CPT | Performed by: INTERNAL MEDICINE

## 2023-07-25 RX ORDER — MELOXICAM 15 MG/1
15 TABLET ORAL DAILY
Qty: 90 TABLET | Refills: 0 | Status: SHIPPED | OUTPATIENT
Start: 2023-07-25

## 2023-10-05 RX ORDER — LEVOTHYROXINE SODIUM 50 MCG
TABLET ORAL
Qty: 90 TABLET | Refills: 1 | Status: SHIPPED | OUTPATIENT
Start: 2023-10-05

## 2023-10-16 ENCOUNTER — OFFICE VISIT (OUTPATIENT)
Dept: ONCOLOGY | Facility: CLINIC | Age: 63
End: 2023-10-16
Payer: COMMERCIAL

## 2023-10-16 ENCOUNTER — LAB (OUTPATIENT)
Dept: LAB | Facility: HOSPITAL | Age: 63
End: 2023-10-16
Payer: COMMERCIAL

## 2023-10-16 VITALS
BODY MASS INDEX: 40.22 KG/M2 | RESPIRATION RATE: 18 BRPM | HEART RATE: 95 BPM | OXYGEN SATURATION: 96 % | DIASTOLIC BLOOD PRESSURE: 81 MMHG | SYSTOLIC BLOOD PRESSURE: 119 MMHG | TEMPERATURE: 98 F | HEIGHT: 61 IN | WEIGHT: 213 LBS

## 2023-10-16 DIAGNOSIS — C50.911 MALIGNANT NEOPLASM OF RIGHT FEMALE BREAST, UNSPECIFIED ESTROGEN RECEPTOR STATUS, UNSPECIFIED SITE OF BREAST: Primary | ICD-10-CM

## 2023-10-16 DIAGNOSIS — Z12.9 CANCER SCREENING: ICD-10-CM

## 2023-10-16 DIAGNOSIS — Z78.0 POST-MENOPAUSAL: ICD-10-CM

## 2023-10-16 PROBLEM — D50.9 ANEMIA, IRON DEFICIENCY: Status: ACTIVE | Noted: 2023-10-16

## 2023-10-16 PROBLEM — D64.9 ANEMIA: Status: ACTIVE | Noted: 2023-10-16

## 2023-10-16 PROBLEM — R73.03 PREDIABETES: Status: ACTIVE | Noted: 2023-10-16

## 2023-10-16 PROBLEM — K64.9 HEMORRHOIDS: Status: ACTIVE | Noted: 2023-10-16

## 2023-10-16 PROBLEM — K21.00 GASTRO-ESOPHAGEAL REFLUX DISEASE WITH ESOPHAGITIS: Status: ACTIVE | Noted: 2023-10-16

## 2023-10-16 PROBLEM — N80.9 ENDOMETRIOSIS: Status: ACTIVE | Noted: 2023-10-16

## 2023-10-16 PROBLEM — K63.5 POLYP OF COLON: Status: ACTIVE | Noted: 2023-06-22

## 2023-10-16 PROBLEM — G47.00 INSOMNIA: Status: ACTIVE | Noted: 2023-10-16

## 2023-10-16 PROBLEM — C80.1 MALIGNANT (PRIMARY) NEOPLASM, UNSPECIFIED: Status: ACTIVE | Noted: 2023-10-16

## 2023-10-16 PROBLEM — E78.00 PURE HYPERCHOLESTEROLEMIA: Status: ACTIVE | Noted: 2023-10-16

## 2023-10-16 PROBLEM — R93.3 ABNORMAL FINDINGS ON DIAGNOSTIC IMAGING OF OTHER PARTS OF DIGESTIVE TRACT: Status: ACTIVE | Noted: 2023-10-16

## 2023-10-16 LAB
ALBUMIN SERPL-MCNC: 4.2 G/DL (ref 3.5–5.2)
ALBUMIN/GLOB SERPL: 1.6 G/DL
ALP SERPL-CCNC: 99 U/L (ref 39–117)
ALT SERPL W P-5'-P-CCNC: 27 U/L (ref 1–33)
ANION GAP SERPL CALCULATED.3IONS-SCNC: 11 MMOL/L (ref 5–15)
AST SERPL-CCNC: 22 U/L (ref 1–32)
BASOPHILS # BLD AUTO: 0.01 10*3/MM3 (ref 0–0.2)
BASOPHILS NFR BLD AUTO: 0.1 % (ref 0–1.5)
BILIRUB SERPL-MCNC: 0.3 MG/DL (ref 0–1.2)
BUN SERPL-MCNC: 24 MG/DL (ref 8–23)
BUN/CREAT SERPL: 33.3 (ref 7–25)
CALCIUM SPEC-SCNC: 9.9 MG/DL (ref 8.6–10.5)
CHLORIDE SERPL-SCNC: 102 MMOL/L (ref 98–107)
CO2 SERPL-SCNC: 27 MMOL/L (ref 22–29)
CREAT SERPL-MCNC: 0.72 MG/DL (ref 0.57–1)
DEPRECATED RDW RBC AUTO: 46.1 FL (ref 37–54)
EGFRCR SERPLBLD CKD-EPI 2021: 92.9 ML/MIN/1.73
EOSINOPHIL # BLD AUTO: 0.17 10*3/MM3 (ref 0–0.4)
EOSINOPHIL NFR BLD AUTO: 2.3 % (ref 0.3–6.2)
ERYTHROCYTE [DISTWIDTH] IN BLOOD BY AUTOMATED COUNT: 14.6 % (ref 12.3–15.4)
GLOBULIN UR ELPH-MCNC: 2.6 GM/DL
GLUCOSE SERPL-MCNC: 98 MG/DL (ref 65–99)
HCT VFR BLD AUTO: 38.9 % (ref 34–46.6)
HGB BLD-MCNC: 12.8 G/DL (ref 12–15.9)
HOLD SPECIMEN: NORMAL
HOLD SPECIMEN: NORMAL
LYMPHOCYTES # BLD AUTO: 2.52 10*3/MM3 (ref 0.7–3.1)
LYMPHOCYTES NFR BLD AUTO: 34 % (ref 19.6–45.3)
MCH RBC QN AUTO: 28.9 PG (ref 26.6–33)
MCHC RBC AUTO-ENTMCNC: 32.9 G/DL (ref 31.5–35.7)
MCV RBC AUTO: 87.8 FL (ref 79–97)
MONOCYTES # BLD AUTO: 1 10*3/MM3 (ref 0.1–0.9)
MONOCYTES NFR BLD AUTO: 13.5 % (ref 5–12)
NEUTROPHILS NFR BLD AUTO: 3.72 10*3/MM3 (ref 1.7–7)
NEUTROPHILS NFR BLD AUTO: 50.1 % (ref 42.7–76)
PLATELET # BLD AUTO: 183 10*3/MM3 (ref 140–450)
PMV BLD AUTO: 11 FL (ref 6–12)
POTASSIUM SERPL-SCNC: 4.1 MMOL/L (ref 3.5–5.2)
PROT SERPL-MCNC: 6.8 G/DL (ref 6–8.5)
RBC # BLD AUTO: 4.43 10*6/MM3 (ref 3.77–5.28)
SODIUM SERPL-SCNC: 140 MMOL/L (ref 136–145)
WBC NRBC COR # BLD: 7.42 10*3/MM3 (ref 3.4–10.8)

## 2023-10-16 PROCEDURE — 80053 COMPREHEN METABOLIC PANEL: CPT | Performed by: INTERNAL MEDICINE

## 2023-10-16 PROCEDURE — 36415 COLL VENOUS BLD VENIPUNCTURE: CPT

## 2023-10-16 PROCEDURE — 85025 COMPLETE CBC W/AUTO DIFF WBC: CPT

## 2023-10-16 NOTE — PROGRESS NOTES
Hematology/Oncology Outpatient Follow Up    PATIENT NAME:Sindy Martin  :1960  MRN: [id number]  PRIMARY CARE PHYSICIAN: Rubens Cali MD  REFERRING PHYSICIAN: No ref. provider found    Chief Complaint   Patient presents with    Follow-up     Malignant neoplasm of right female breast, unspecified estrogen receptor status, unspecified site of breast        HISTORY OF PRESENT ILLNESS:     This is a 62-year-old female who was originally seen on 14 after she presented with early stage right breast cancer.  Please refer to the details of my notes for more information.   She had an abnormal mammogram in May 2014.    6/10/14 - She underwent ultrasound guided localization of the right breast mass with sentinel lymph node injection, right lumpectomy.  Pathology revealed one sentinel lymph node negative for malignancy.  Tumor was strongly positive for ER, WY and HER-2/hiro was negative.  Tumor measured 1 cm in greatest dimension.  It was a grade III disease.  All the margins were negative.  Pathologic stage is [id number].    14 - Patient’s tumor was sent for Oncotype DX assay.  This returned with a recurrent score and validation study, she has a 12% chance of distant recurrence despite five years of Tamoxifen over the next 10 years.  On the validation study her ER was positive, WY was positive and HER-2/hiro was negative.  14 - Patient had BRCA 1 and 2 mutational analysis which returned with BRCA 2 deleterious mutation involving [id number] on the BRCA 2 sequencing gene.    2014 - She had FSH and serum estradiol levels, which are noted to be in the postmenopausal range.    14 - Patient was initiated on adjuvant chemotherapy with Taxotere and Cytoxan along with Neulasta.     10/6/14 - Patient completed 4th cycle of chemotherapy with Cytoxan and Taxotere.   Patient was seen by Dr. Buck who performed excisional biopsy of a mole on the left forearm and this was benign from history.     14 -  Patient completed whole right breast radiation under the direction of Dr. Mcgrath.  She received total dose of 4256 cGy.  Patient has been seen by Dr. Son who will perform her complete hysterectomy due to BRCA positivity after the first of the year.      12/23/14 - Patient had a bone density, which was essentially normal.    December 2014 - Patient was initiated on Arimidex 1 mg every day as well as Os-Quinton D.   April 2015 - Patient underwent total robotic hysterectomy with bilateral adnexectomy.  Pathology was benign.  I have requested for official report of the above procedure and path reports.  Patient has been seen by Dr. Son on 4/29/15.    5/19/15 - Port catheter was removed.   5/28/15 - Bilateral diagnostic mammogram which was essentially unremarkable except that patient has heterogenously dense breasts.  6/22/15 - Patient was seen by Dr. Guo.  Follow up in one year was recommended.    Fasting lipid profile done which showed total cholesterol of 234, HDL of 42, triglyceride 237, elevated, and LDH was 145, also elevated.  BUN 20, creatinine 0.87 and LFTs were essentially unremarkable.  WBC 6.7, hemoglobin 12.4 and platelet count 197,000.    12/5/15 - Fasting lipid profile showed total cholesterol 133, HDL 50 and LDL 67, triglyceride 81.  BUN 22, creatinine 0.7.  2/24/16 - Bilateral breast MRI which showed post-op scar change on the right breast, but no MR finding to suggest malignancy.  She has a 4 mm enhancing lesion in the lower right breast at the inframammary fold.  Patient’s breast exam and skin evaluation was essentially unremarkable at the site where the 4 mm enhancing lesion was noted.  Patient also had no specific complaints.  3/22/16 - Anemia work up with reticulocyte count (N), B12 (N) 521, ferritin (N) 43, folate 20, haptoglobin 184, , BUN 11, creatinine 11.8.  SPEP with immunofixation assay showed IgG kappa monoclonal protein with M spike of 0.2 gm/dL.  Erythropoietin level (N) 22  [range 2-18].    3/31/16 - Quantitative immunoglobulins for IgA, IgG and IgM were normal.  Serum free light chains were essentially unremarkable.    4/5/16 - 24-hour urine electrophoresis which did not show any evidence of monoclonal gammopathy.   6/10/16 - Bilateral mammogram was essentially normal.  Follow up in one year was recommended.    6/20/16 - Serum transferrin receptor assay returned with high level at 5.2 [range 1.9-4.9].    6/27/16 - Urinalysis showed trace blood.  8/1/16 - Patient was seen by Dr. Torres and had EGD and colonoscopy.  EGD showed hiatal hernia, moderate to severe gastroduodenitis, but no ulcers seen.  No abnormalities seen on her colonoscopy.   9/12/16 - Chemistry panel:  BUN 14, creatinine 0.8, total protein (L) 5.8, alkaline phosphatase 94 [range 32-91].  Quantitative immunoglobulins for IgA (N) 101, IgG slightly low at 530 [range 600-1500], IgM (N) 195.  SPEP with immunofixation assay was essentially negative for any significant monoclonal protein.   9/19/16 - Urinalysis was negative for hematuria.   10/12/16 - Bone scan showed degenerative changes in the knees, otherwise negative.  No signs of metastatic disease.    10/10 & 10/17/16 - Patient received IV Injectafer.    1/16/17 - SPEP with immunofixation assay which did not show any evidence of monoclonal protein.   2/9/17 - Bilateral breast MRI showed no evidence of malignancy, but she had a skin lesion noted on the left breast.  Patient has since then seen Dr. Buck.  She has a follow up appointment in six months.    6/26/17 - Patient had bilateral diagnostic mammogram with tomosynthesis.  This revealed new linear pleomorphic microcalcifications in the medial right breast.  The left breast was unchanged.  Stereotactic biopsy was recommended.    7/12/17 - Patient underwent stereotactic biopsy of the right breast calcifications.  Pathology showed fibrous mastopathy with coarse stromal microcalcifications suggestive of previous procedure.   Negative for carcinoma in situ and invasive carcinoma.  The result was concurrent with the image findings.  Follow up in one year was recommended.    9/11/17 - Since her last visit patient had labs including SPEP with immunofixation assay which showed a free lambda light chain identified.  IgA was 83.  IgG was 460.  IgM was 158.  BUN 17.  Creatinine 0.6.  Alkaline phosphatase 101.  Serum calcium 9.2.  CMP was normal.    9/16/17 - DEXA scan was normal.    2/15/18 - Bilateral breast MRI with stable post-op changes seen at the right breast, but no mass was seen in either the right or left breast.  Bilateral breast mammogram is due June 2018.   3/12/18 - CBC:  WBC 7.9, hemoglobin 13, platelet count 183,000.  Chemistry panel:  BUN 19.  Creatinine 0.7.  Alkaline phosphatase 92.  IgA 511.  IgG low at 518.  IgM normal 191.  Kappa lambda free light chains were normal.  SPEP with LAZARO was normal.    7/30/18 - Bilateral diagnostic mammogram with 3D with no mammographic evidence of malignancy.    9/18/18 - CT scan of the abdomen and pelvis which showed post-op changes, but no masses were identified in the abdomen.    2/4/19 - SPEP with LAZARO which did not show any monoclonal protein.  Total cholesterol was 116, triglycerides 69, LDL 66, HDL 43.  Chemistry:  BUN 17, creatinine 0.9, liver function tests are normal.    2/22/2019-patient had bilateral breast MRI, there was no MR evidence of malignancy in either breast.  8/29/19-patient had a DEXA scan, this was normal bone mineral density  8/29/2019-patient had bilateral mammogram which showed heterogeneously dense breast tissue.  Scar tissue was noted on the right breast consistent with previous surgery.  Follow-up in 1 year was recommended  2/19/2020 patient had bilateral breast MRI which was normal, follow-up in 1 year was recommended  She is here today for routine follow-up visit.  Patient does not have any breast specific complaints today  March 2, 2021 patient had bilateral  breast MRI which did not show any MR evidence of malignancy  March 6, 2021 she had MRI of the abdomen which basically revealed mild fatty and atrophic pancreas no solid or cystic pancreatic mass was seen.  Patient to follow-up with Dr. Torres for EUS which would be due March 2022 February 2023: Anemia labs showed iron deficiency with a ferritin of 11.2.  She also had decreased iron saturation at 5% CMP was unremarkable except for slightly elevated blood sugar 115, LDH was normal at 204, folate was more than 20, haptoglobin was 237, reticulocyte count was normal at 1.35 B12 was 573 SPEP with LAZARO did not show any monoclonal protein. Urinalysis was negative for hematuria.  She was recommended to receive iron infusion and GI referral  4/20/2023: Patient was seen by Dr. Mayer who recommended EGD and colonoscopy    Past Medical History:   Diagnosis Date    Depression     Hearing loss     Hypertension     Hypothyroidism        Past Surgical History:   Procedure Laterality Date    APPENDECTOMY      BREAST BIOPSY      BREAST LUMPECTOMY      CARPAL TUNNEL RELEASE      CHOLECYSTECTOMY      COLONOSCOPY  2018 (?)    HYSTERECTOMY      LYMPH NODE BIOPSY  2014    [place] node under left arm    OOPHORECTOMY      UPPER ENDOSCOPIC ULTRASOUND W/ FNA N/A 04/28/2022    Procedure: EUS WITH BIOPSY;  Surgeon: Roldan Mayer MD;  Location: [facility];  Service: Gastroenterology;  Laterality: N/A;  HIATEL HERNIA, GASTRIC POLYP         Current Outpatient Medications:     ALOE VERA JUICE PO, Take 2 oz by mouth 2 (Two) Times a Day., Disp: , Rfl:     aspirin 81 MG tablet, Take 1 tablet by mouth Daily., Disp: , Rfl:     atorvastatin (LIPITOR) 20 MG tablet, TAKE 1 TABLET DAILY, Disp: 90 tablet, Rfl: 1    Biotin 5 MG capsule, Take  by mouth., Disp: , Rfl:     buPROPion XL (WELLBUTRIN XL) 150 MG 24 hr tablet, Take 1 tablet by mouth Every Morning., Disp: , Rfl:     escitalopram (LEXAPRO) 20 MG tablet, Take 1 tablet by mouth Daily., Disp: 90  tablet, Rfl: 3    exemestane (AROMASIN) 25 MG tablet, TAKE 1 TABLET DAILY, Disp: 90 tablet, Rfl: 3    loratadine (CLARITIN) 10 MG tablet, Take 1 tablet by mouth Daily., Disp: , Rfl:     Magnesium 250 MG tablet, Take 2 tablets by mouth Daily., Disp: , Rfl:     meloxicam (MOBIC) 15 MG tablet, Take 1 tablet by mouth Daily., Disp: 90 tablet, Rfl: 0    metFORMIN (GLUCOPHAGE) 500 MG tablet, TAKE 2 TABLETS TWICE A DAY, Disp: 360 tablet, Rfl: 3    multivitamin with minerals tablet tablet, Take 1 tablet by mouth Daily., Disp: , Rfl:     NON FORMULARY, OSTEOBIFLEX 1 TAB DAILY, Disp: , Rfl:     pantoprazole (PROTONIX) 40 MG EC tablet, Take 1 tablet by mouth Daily., Disp: , Rfl:     polycarbophil 625 MG tablet tablet, Take 2 tablets by mouth Daily., Disp: , Rfl:     Probiotic Product (PROBIOTIC BLEND PO), Take 1 tablet by mouth Daily., Disp: , Rfl:     Synthroid 50 MCG tablet, TAKE 1 TABLET DAILY, Disp: 90 tablet, Rfl: 1    temazepam (RESTORIL) 30 MG capsule, TAKE 1 CAPSULE BY MOUTH AT NIGHT AS NEEDED FOR SLEEP OR ANXIETY, Disp: , Rfl:     Allergies   Allergen Reactions    Codeine Nausea And Vomiting    Ibuprofen Anaphylaxis       Family History   Problem Relation Age of Onset    Arthritis Mother     Gout Mother     Parkinsonism Mother     Miscarriages / Stillbirths Mother         Miscarriage of second child    Lung cancer Father     Arthritis Father     Cancer Father     Depression Father     Mental illness Father     Lung cancer Maternal Aunt     Cancer Maternal Aunt     Uterine cancer Paternal Aunt 60    Cancer Paternal Aunt     Prostate cancer Paternal Uncle     Cancer Paternal Uncle     Melanoma Paternal Grandmother     Cancer Paternal Grandmother     Depression Paternal Grandmother     Liver disease Paternal Grandmother     Mental illness Paternal Grandmother     Prostate cancer Paternal Cousin     Melanoma Paternal Cousin     Esophageal cancer Paternal Cousin     Thyroid cancer Maternal Cousin 40    Esophageal cancer  Maternal Cousin     Ovarian cancer Maternal Cousin 50    Heart disease Maternal Grandfather     Diabetes Maternal Grandmother     Alcohol abuse Maternal Uncle     COPD Maternal Uncle     Heart disease Maternal Uncle     Heart disease Paternal Uncle     Liver disease Maternal Uncle        Cancer-related family history includes Cancer in her father, maternal aunt, paternal aunt, paternal grandmother, and paternal uncle; Esophageal cancer in her maternal cousin and paternal cousin; Lung cancer in her father and maternal aunt; Melanoma in her paternal cousin and paternal grandmother; Ovarian cancer (age of onset: 50) in her maternal cousin; Prostate cancer in her paternal cousin and paternal uncle; Thyroid cancer (age of onset: 40) in her maternal cousin; Uterine cancer (age of onset: 60) in her paternal aunt.    Social History     Tobacco Use    Smoking status: Never    Smokeless tobacco: Never   Vaping Use    Vaping Use: Never used   Substance Use Topics    Alcohol use: Not Currently     Comment: Very sporadically.  Not even once a month.  A glass of wine    Drug use: Never     I have reviewed and confirmed the accuracy of the patient's history: Chief complaint, HPI, ROS, and Subjective as entered by the MA/LPN/RN. Munira Finney MD 10/16/23      SUBJECTIVE:    Patient denies any new issues.  She has kept her follow-up appointments with her physicians    Denies any new issues.  She has been tolerating oral iron supplements          REVIEW OF SYSTEMS:    Review of Systems   Constitutional:  Negative for chills and fever.   HENT:  Negative for ear pain, mouth sores, nosebleeds and sore throat.    Eyes:  Negative for photophobia and visual disturbance.   Respiratory:  Negative for wheezing and stridor.    Cardiovascular:  Negative for chest pain and palpitations.   Gastrointestinal:  Negative for abdominal pain, diarrhea, nausea and vomiting.   Endocrine: Negative for cold intolerance and heat intolerance.  "  Genitourinary:  Negative for dysuria and hematuria.   Musculoskeletal:  Negative for joint swelling and neck stiffness.   Skin:  Negative for color change and rash.   Neurological:  Negative for seizures and syncope.   Hematological:  Negative for adenopathy.        No obvious bleeding   Psychiatric/Behavioral:  Negative for agitation, confusion and hallucinations.      ROS as documented above    OBJECTIVE:    Vitals:    10/16/23 1019   BP: 119/81   Pulse: 95   Resp: 18   Temp: 98 °F (36.7 °C)   TempSrc: Infrared   SpO2: 96%   Weight: 96.6 kg (213 lb)   Height: 154.9 cm (61\")   PainSc: 0-No pain         ECOG    (1) Restricted in physically strenuous activity, ambulatory and able to do work of light nature    Physical Exam   Constitutional: She is oriented to person, place, and time. No distress.   HENT:   Head: Normocephalic and atraumatic.   Eyes: Conjunctivae are normal. Right eye exhibits no discharge. Left eye exhibits no discharge. No scleral icterus.   Neck: No thyromegaly present.   Cardiovascular: Normal rate, regular rhythm and normal heart sounds. Exam reveals no gallop and no friction rub.   Pulmonary/Chest: Effort normal. No stridor. No respiratory distress. She has no wheezes. Right breast exhibits no inverted nipple, no mass, no nipple discharge, no skin change and no tenderness. Left breast exhibits no inverted nipple, no mass, no nipple discharge, no skin change and no tenderness. No breast bleeding. Breasts are symmetrical.   Bilateral breast exam is negative  Bilateral axillary exam is negative   Abdominal: Soft. Bowel sounds are normal. She exhibits no mass. There is no abdominal tenderness. There is no rebound and no guarding.   Genitourinary: No breast bleeding.   Musculoskeletal: Normal range of motion. No tenderness.   Lymphadenopathy:     She has no cervical adenopathy.   Neurological: She is alert and oriented to person, place, and time. She exhibits normal muscle tone.   Skin: Skin is " warm. No rash noted. She is not diaphoretic. No erythema.   Psychiatric: Her behavior is normal.   Nursing note and vitals reviewed.    Physical exam as documented above    I have reexamined the patient and the results are consistent with the previously documented exam. Munira Finney MD      RECENT LABS    WBC   Date Value Ref Range Status   10/16/2023 7.42 3.40 - 10.80 10*3/mm3 Final     RBC   Date Value Ref Range Status   10/16/2023 4.43 3.77 - 5.28 10*6/mm3 Final     Hemoglobin   Date Value Ref Range Status   10/16/2023 12.8 12.0 - 15.9 g/dL Final     Hematocrit   Date Value Ref Range Status   10/16/2023 38.9 34.0 - 46.6 % Final     MCV   Date Value Ref Range Status   10/16/2023 87.8 79.0 - 97.0 fL Final     MCH   Date Value Ref Range Status   10/16/2023 28.9 26.6 - 33.0 pg Final     MCHC   Date Value Ref Range Status   10/16/2023 32.9 31.5 - 35.7 g/dL Final     RDW   Date Value Ref Range Status   10/16/2023 14.6 12.3 - 15.4 % Final     RDW-SD   Date Value Ref Range Status   10/16/2023 46.1 37.0 - 54.0 fl Final     MPV   Date Value Ref Range Status   10/16/2023 11.0 6.0 - 12.0 fL Final     Platelets   Date Value Ref Range Status   10/16/2023 183 140 - 450 10*3/mm3 Final     Neutrophil %   Date Value Ref Range Status   10/16/2023 50.1 42.7 - 76.0 % Final     Lymphocyte %   Date Value Ref Range Status   10/16/2023 34.0 19.6 - 45.3 % Final     Monocyte %   Date Value Ref Range Status   10/16/2023 13.5 (H) 5.0 - 12.0 % Final     Eosinophil %   Date Value Ref Range Status   10/16/2023 2.3 0.3 - 6.2 % Final     Basophil %   Date Value Ref Range Status   10/16/2023 0.1 0.0 - 1.5 % Final     Immature Grans %   Date Value Ref Range Status   03/10/2021 0.2 0.0 - 0.5 % Final     Neutrophils, Absolute   Date Value Ref Range Status   10/16/2023 3.72 1.70 - 7.00 10*3/mm3 Final     Lymphocytes, Absolute   Date Value Ref Range Status   10/16/2023 2.52 0.70 - 3.10 10*3/mm3 Final     Monocytes, Absolute   Date Value Ref  Range Status   10/16/2023 1.00 (H) 0.10 - 0.90 10*3/mm3 Final     Eosinophils, Absolute   Date Value Ref Range Status   10/16/2023 0.17 0.00 - 0.40 10*3/mm3 Final     Basophils, Absolute   Date Value Ref Range Status   10/16/2023 0.01 0.00 - 0.20 10*3/mm3 Final     Immature Grans, Absolute   Date Value Ref Range Status   03/10/2021 0.02 0.00 - 0.05 10*3/mm3 Final     nRBC   Date Value Ref Range Status   03/18/2023 0.1 0.0 - 0.2 /100 WBC Final       Lab Results   Component Value Date    GLUCOSE 98 10/16/2023    BUN 24 (H) 10/16/2023    CREATININE 0.72 10/16/2023    EGFRIFNONA 76 02/14/2022    EGFRIFAFRI 85 11/10/2016    BCR 33.3 (H) 10/16/2023    K 4.1 10/16/2023    CO2 27.0 10/16/2023    CALCIUM 9.9 10/16/2023    PROTENTOTREF 6.2 02/16/2023    ALBUMIN 4.2 10/16/2023    LABIL2 1.4 02/16/2023    AST 22 10/16/2023    ALT 27 10/16/2023           ASSESSMENT:    T1b N0 M0 invasive ductal carcinoma, ER positive, MN positive, HER-2/hiro negative, grade III tumor.  Ongoing surveillance  Status post right lumpectomy with sentinel lymph node biopsy.  Reviewed  Status post adjuvant chemotherapy with Cytoxan and Taxotere for four cycles.    BRCA 2 sequencing gene with 3036 deletion 4 deleterious mutation.  Ongoing surveillance for high risk mutation  Status post right breast radiation.   Recurrent anemia: Work-up revealed iron deficiency.  Referred to Dr. Mayer April 2023.  Plan for EGD colonoscopy coming up next week.  Patient will continue oral iron supplementation.  She has had a hemoglobin response  Postmenopausal hormonal status.   Status post robotic complete hysterectomy and adnexectomy with benign findings.    Hypercholesterolemia on treatment.  Lipid panel 2/4/19 was normal.   Arimidex initiated December 2014, discontinued October 2016 due to side effects.  Aromasin initiated November 2016.  Remains on Aromasin.  Continue Aromasin  Iron deficiency anemia, resolved.   Monoclonal gammopathy of unknown significance.  These  has been zoraida;l for past 2 years. Will discontinue checking.  Bilateral hip pain.  Resolved        PLANS:    Continue iron supplementation.  Patient will discontinue once her current prescription finishes       Continue Aromasin, Os-Quinton D till November 2024    MRI  breast be due April 2024.    Bilateral screening mammogram will be due October 2023.  We will order today    Abdominal MRI completed 4/25/2023: Normal.  Will be done every 2 years.  Reviewed    Patient will  be due for bone density in September 2023.  This has been ordered today    Status post colonoscopy and EGD by Dr. Mayer June 2023    Follow-up with dermatologist once a year: Reviewed    Dermatology apt with  Dr Buck once a year next due in October 2023.  Appointment is coming up    Follow up with Dr. Mayer for EUS for pancreas due March 2024.     Monoclonal labs have been normal past 2 years therefore will discontinue checking at this time.    She will continue monthly breast self exam and call for lumps, nipple discharge, skin discoloration. A CMP will be done today.        She will follow up with Dr. Mayer for pancreatic cancer screening.  We discussed use of pancreatic MRI alternating with EUS.          Patient is five years out from her breast cancer diagnosis.  I did discuss prophylactic mastectomies.  At this time patient wants to continue with breast conservation.  I will continue Aromasin for her for breast cancer prevention.  She has not had any significant toxicities on this treatment.             I have reviewed labs results, imaging, vitals, and medications with the patient today. Follow up in 6 months with me or earlier as needed for problems         Patient verbalized understanding and is in agreement of the above plan.

## 2023-11-01 ENCOUNTER — HOSPITAL ENCOUNTER (OUTPATIENT)
Dept: MAMMOGRAPHY | Facility: HOSPITAL | Age: 63
Discharge: HOME | End: 2023-11-01
Payer: COMMERCIAL

## 2023-11-01 ENCOUNTER — HOSPITAL ENCOUNTER (OUTPATIENT)
Dept: BONE DENSITY | Facility: HOSPITAL | Age: 63
Discharge: HOME | End: 2023-11-01
Admitting: INTERNAL MEDICINE
Payer: COMMERCIAL

## 2023-11-01 DIAGNOSIS — C50.911 MALIGNANT NEOPLASM OF RIGHT FEMALE BREAST, UNSPECIFIED ESTROGEN RECEPTOR STATUS, UNSPECIFIED SITE OF BREAST: ICD-10-CM

## 2023-11-01 DIAGNOSIS — Z12.9 CANCER SCREENING: ICD-10-CM

## 2023-11-01 DIAGNOSIS — Z78.0 POST-MENOPAUSAL: ICD-10-CM

## 2023-11-01 PROCEDURE — 77067 SCR MAMMO BI INCL CAD: CPT

## 2023-11-01 PROCEDURE — 77063 BREAST TOMOSYNTHESIS BI: CPT

## 2023-11-01 PROCEDURE — 77080 DXA BONE DENSITY AXIAL: CPT

## 2023-11-03 ENCOUNTER — TELEPHONE (OUTPATIENT)
Dept: FAMILY MEDICINE CLINIC | Facility: CLINIC | Age: 63
End: 2023-11-03
Payer: COMMERCIAL

## 2023-11-03 NOTE — TELEPHONE ENCOUNTER
She would get the Prevnar 20, that is currently the only pneumonia vaccine out for adults. She can come in on the MISC schedule or get it at her next wellness with him. She also doesn't currently have a wellness scheduled, can you please get that scheduled for her after 2/15/24?

## 2023-11-06 ENCOUNTER — TELEPHONE (OUTPATIENT)
Dept: FAMILY MEDICINE CLINIC | Facility: CLINIC | Age: 63
End: 2023-11-06
Payer: COMMERCIAL

## 2023-11-06 NOTE — TELEPHONE ENCOUNTER
RELAY    Left detailed message on patient's voice mail at 9am.  She would get the Prevnar 20, that is currently the only pneumonia vaccine out for adults. She can come in on the MISC schedule or get it at her next wellness with him. She also doesn't currently have a wellness scheduled, can you please get that scheduled for her after 2/15/24?

## 2023-11-06 NOTE — TELEPHONE ENCOUNTER
Spoke with patient and scheduled a [facility] nurse appt on 11/8/2023 at 1:15pm.  She will scheduled her physical with PMJ when she is in the office.

## 2023-11-06 NOTE — TELEPHONE ENCOUNTER
Hub staff attempted to follow warm transfer process and was unsuccessful     Caller: Sindy Martin    Relationship to patient: Self    Best call back number: [phone]     Patient is needing: PATIENT CALLING TO SCHEDULE PNEUMONIA VACCINE

## 2023-11-08 ENCOUNTER — CLINICAL SUPPORT (OUTPATIENT)
Dept: FAMILY MEDICINE CLINIC | Facility: CLINIC | Age: 63
End: 2023-11-08
Payer: COMMERCIAL

## 2023-11-08 DIAGNOSIS — Z23 NEED FOR PNEUMOCOCCAL VACCINE: Primary | ICD-10-CM

## 2023-11-13 RX ORDER — MELOXICAM 15 MG/1
15 TABLET ORAL DAILY
Qty: 90 TABLET | Refills: 0 | Status: SHIPPED | OUTPATIENT
Start: 2023-11-13

## 2023-11-15 RX ORDER — ATORVASTATIN CALCIUM 20 MG/1
TABLET, FILM COATED ORAL
Qty: 90 TABLET | Refills: 1 | Status: SHIPPED | OUTPATIENT
Start: 2023-11-15

## 2024-01-18 RX ORDER — GABAPENTIN 100 MG/1
CAPSULE ORAL
Qty: 90 CAPSULE | Refills: 2 | Status: SHIPPED | OUTPATIENT
Start: 2024-01-18

## 2024-02-03 RX ORDER — GABAPENTIN 400 MG/1
1200 CAPSULE ORAL 3 TIMES DAILY
Qty: 270 CAPSULE | Refills: 6 | Status: SHIPPED | OUTPATIENT
Start: 2024-02-03 | End: 2024-03-04

## 2024-02-16 RX ORDER — MELOXICAM 15 MG/1
15 TABLET ORAL DAILY
Qty: 90 TABLET | Refills: 0 | Status: SHIPPED | OUTPATIENT
Start: 2024-02-16

## 2024-04-01 RX ORDER — LEVOTHYROXINE SODIUM 50 MCG
TABLET ORAL
Qty: 90 TABLET | Refills: 1 | Status: SHIPPED | OUTPATIENT
Start: 2024-04-01

## 2024-04-15 NOTE — PROGRESS NOTES
Hematology/Oncology Outpatient Follow Up    PATIENT NAME:Sindy Martin  :1960  MRN: [id number]  PRIMARY CARE PHYSICIAN: Rubens Cali MD  REFERRING PHYSICIAN: Rubens Cali MD    Chief Complaint   Patient presents with    Follow-up     Malignant neoplasm of right female breast, unspecified estrogen receptor status, unspecified site of breast        HISTORY OF PRESENT ILLNESS:     This is a 63-year-old female who was originally seen on 14 after she presented with early stage right breast cancer.  Please refer to the details of my notes for more information.   She had an abnormal mammogram in May 2014.    6/10/14 - She underwent ultrasound guided localization of the right breast mass with sentinel lymph node injection, right lumpectomy.  Pathology revealed one sentinel lymph node negative for malignancy.  Tumor was strongly positive for ER, VA and HER-2/hiro was negative.  Tumor measured 1 cm in greatest dimension.  It was a grade III disease.  All the margins were negative.  Pathologic stage is [id number].    14 - Patient’s tumor was sent for Oncotype DX assay.  This returned with a recurrent score and validation study, she has a 12% chance of distant recurrence despite five years of Tamoxifen over the next 10 years.  On the validation study her ER was positive, VA was positive and HER-2/hiro was negative.  14 - Patient had BRCA 1 and 2 mutational analysis which returned with BRCA 2 deleterious mutation involving [id number] on the BRCA 2 sequencing gene.    2014 - She had FSH and serum estradiol levels, which are noted to be in the postmenopausal range.    14 - Patient was initiated on adjuvant chemotherapy with Taxotere and Cytoxan along with Neulasta.     10/6/14 - Patient completed 4th cycle of chemotherapy with Cytoxan and Taxotere.   Patient was seen by Dr. Buck who performed excisional biopsy of a mole on the left forearm and this was benign from history.     14 -  Patient completed whole right breast radiation under the direction of Dr. Mcgrath.  She received total dose of 4256 cGy.  Patient has been seen by Dr. Son who will perform her complete hysterectomy due to BRCA positivity after the first of the year.      12/23/14 - Patient had a bone density, which was essentially normal.    December 2014 - Patient was initiated on Arimidex 1 mg every day as well as Os-Quinton D.   April 2015 - Patient underwent total robotic hysterectomy with bilateral adnexectomy.  Pathology was benign.  I have requested for official report of the above procedure and path reports.  Patient has been seen by Dr. Son on 4/29/15.    5/19/15 - Port catheter was removed.   5/28/15 - Bilateral diagnostic mammogram which was essentially unremarkable except that patient has heterogenously dense breasts.  6/22/15 - Patient was seen by Dr. Guo.  Follow up in one year was recommended.    Fasting lipid profile done which showed total cholesterol of 234, HDL of 42, triglyceride 237, elevated, and LDH was 145, also elevated.  BUN 20, creatinine 0.87 and LFTs were essentially unremarkable.  WBC 6.7, hemoglobin 12.4 and platelet count 197,000.    12/5/15 - Fasting lipid profile showed total cholesterol 133, HDL 50 and LDL 67, triglyceride 81.  BUN 22, creatinine 0.7.  2/24/16 - Bilateral breast MRI which showed post-op scar change on the right breast, but no MR finding to suggest malignancy.  She has a 4 mm enhancing lesion in the lower right breast at the inframammary fold.  Patient’s breast exam and skin evaluation was essentially unremarkable at the site where the 4 mm enhancing lesion was noted.  Patient also had no specific complaints.  3/22/16 - Anemia work up with reticulocyte count (N), B12 (N) 521, ferritin (N) 43, folate 20, haptoglobin 184, , BUN 11, creatinine 11.8.  SPEP with immunofixation assay showed IgG kappa monoclonal protein with M spike of 0.2 gm/dL.  Erythropoietin level (N) 22  [range 2-18].    3/31/16 - Quantitative immunoglobulins for IgA, IgG and IgM were normal.  Serum free light chains were essentially unremarkable.    4/5/16 - 24-hour urine electrophoresis which did not show any evidence of monoclonal gammopathy.   6/10/16 - Bilateral mammogram was essentially normal.  Follow up in one year was recommended.    6/20/16 - Serum transferrin receptor assay returned with high level at 5.2 [range 1.9-4.9].    6/27/16 - Urinalysis showed trace blood.  8/1/16 - Patient was seen by Dr. Torres and had EGD and colonoscopy.  EGD showed hiatal hernia, moderate to severe gastroduodenitis, but no ulcers seen.  No abnormalities seen on her colonoscopy.   9/12/16 - Chemistry panel:  BUN 14, creatinine 0.8, total protein (L) 5.8, alkaline phosphatase 94 [range 32-91].  Quantitative immunoglobulins for IgA (N) 101, IgG slightly low at 530 [range 600-1500], IgM (N) 195.  SPEP with immunofixation assay was essentially negative for any significant monoclonal protein.   9/19/16 - Urinalysis was negative for hematuria.   10/12/16 - Bone scan showed degenerative changes in the knees, otherwise negative.  No signs of metastatic disease.    10/10 & 10/17/16 - Patient received IV Injectafer.    1/16/17 - SPEP with immunofixation assay which did not show any evidence of monoclonal protein.   2/9/17 - Bilateral breast MRI showed no evidence of malignancy, but she had a skin lesion noted on the left breast.  Patient has since then seen Dr. Buck.  She has a follow up appointment in six months.    6/26/17 - Patient had bilateral diagnostic mammogram with tomosynthesis.  This revealed new linear pleomorphic microcalcifications in the medial right breast.  The left breast was unchanged.  Stereotactic biopsy was recommended.    7/12/17 - Patient underwent stereotactic biopsy of the right breast calcifications.  Pathology showed fibrous mastopathy with coarse stromal microcalcifications suggestive of previous procedure.   Negative for carcinoma in situ and invasive carcinoma.  The result was concurrent with the image findings.  Follow up in one year was recommended.    9/11/17 - Since her last visit patient had labs including SPEP with immunofixation assay which showed a free lambda light chain identified.  IgA was 83.  IgG was 460.  IgM was 158.  BUN 17.  Creatinine 0.6.  Alkaline phosphatase 101.  Serum calcium 9.2.  CMP was normal.    9/16/17 - DEXA scan was normal.    2/15/18 - Bilateral breast MRI with stable post-op changes seen at the right breast, but no mass was seen in either the right or left breast.  Bilateral breast mammogram is due June 2018.   3/12/18 - CBC:  WBC 7.9, hemoglobin 13, platelet count 183,000.  Chemistry panel:  BUN 19.  Creatinine 0.7.  Alkaline phosphatase 92.  IgA 511.  IgG low at 518.  IgM normal 191.  Kappa lambda free light chains were normal.  SPEP with LAZARO was normal.    7/30/18 - Bilateral diagnostic mammogram with 3D with no mammographic evidence of malignancy.    9/18/18 - CT scan of the abdomen and pelvis which showed post-op changes, but no masses were identified in the abdomen.    2/4/19 - SPEP with LAZARO which did not show any monoclonal protein.  Total cholesterol was 116, triglycerides 69, LDL 66, HDL 43.  Chemistry:  BUN 17, creatinine 0.9, liver function tests are normal.    2/22/2019-patient had bilateral breast MRI, there was no MR evidence of malignancy in either breast.  8/29/19-patient had a DEXA scan, this was normal bone mineral density  8/29/2019-patient had bilateral mammogram which showed heterogeneously dense breast tissue.  Scar tissue was noted on the right breast consistent with previous surgery.  Follow-up in 1 year was recommended  2/19/2020 patient had bilateral breast MRI which was normal, follow-up in 1 year was recommended  She is here today for routine follow-up visit.  Patient does not have any breast specific complaints today  March 2, 2021 patient had bilateral  breast MRI which did not show any MR evidence of malignancy  March 6, 2021 she had MRI of the abdomen which basically revealed mild fatty and atrophic pancreas no solid or cystic pancreatic mass was seen.  Patient to follow-up with Dr. Torres for EUS which would be due March 2022 February 2023: Anemia labs showed iron deficiency with a ferritin of 11.2.  She also had decreased iron saturation at 5% CMP was unremarkable except for slightly elevated blood sugar 115, LDH was normal at 204, folate was more than 20, haptoglobin was 237, reticulocyte count was normal at 1.35 B12 was 573 SPEP with LAZARO did not show any monoclonal protein. Urinalysis was negative for hematuria.  She was recommended to receive iron infusion and GI referral  4/20/2023: Patient was seen by Dr. Mayer who recommended EGD and colonoscopy    Past Medical History:   Diagnosis Date    Depression     Hearing loss     Hypertension     Hypothyroidism        Past Surgical History:   Procedure Laterality Date    APPENDECTOMY      BREAST BIOPSY      BREAST LUMPECTOMY      CARPAL TUNNEL RELEASE      CHOLECYSTECTOMY      COLONOSCOPY  2018 (?)    HYSTERECTOMY      LYMPH NODE BIOPSY  2014    [place] node under left arm    OOPHORECTOMY      UPPER ENDOSCOPIC ULTRASOUND W/ FNA N/A 04/28/2022    Procedure: EUS WITH BIOPSY;  Surgeon: Roldan Mayer MD;  Location: [facility] ENDOSCOPY;  Service: Gastroenterology;  Laterality: N/A;  HIATEL HERNIA, GASTRIC POLYP         Current Outpatient Medications:     ALOE VERA JUICE PO, Take 2 oz by mouth 2 (Two) Times a Day., Disp: , Rfl:     aspirin 81 MG tablet, Take 1 tablet by mouth Daily., Disp: , Rfl:     atorvastatin (LIPITOR) 20 MG tablet, TAKE 1 TABLET DAILY, Disp: 90 tablet, Rfl: 1    Biotin 5 MG capsule, Take  by mouth., Disp: , Rfl:     buPROPion XL (WELLBUTRIN XL) 150 MG 24 hr tablet, Take 1 tablet by mouth Every Morning., Disp: , Rfl:     escitalopram (LEXAPRO) 20 MG tablet, Take 1 tablet by mouth Daily., Disp: 90  tablet, Rfl: 3    exemestane (AROMASIN) 25 MG tablet, TAKE 1 TABLET DAILY, Disp: 90 tablet, Rfl: 3    loratadine (CLARITIN) 10 MG tablet, Take 1 tablet by mouth Daily., Disp: , Rfl:     Magnesium 250 MG tablet, Take 2 tablets by mouth Daily., Disp: , Rfl:     meloxicam (MOBIC) 15 MG tablet, TAKE 1 TABLET BY MOUTH DAILY, Disp: 90 tablet, Rfl: 0    metFORMIN (GLUCOPHAGE) 500 MG tablet, TAKE 2 TABLETS TWICE A DAY, Disp: 360 tablet, Rfl: 3    multivitamin with minerals tablet tablet, Take 1 tablet by mouth Daily., Disp: , Rfl:     NON FORMULARY, OSTEOBIFLEX 1 TAB DAILY, Disp: , Rfl:     pantoprazole (PROTONIX) 40 MG EC tablet, Take 1 tablet by mouth Daily., Disp: , Rfl:     polycarbophil 625 MG tablet tablet, Take 2 tablets by mouth Daily., Disp: , Rfl:     Probiotic Product (PROBIOTIC BLEND PO), Take 1 tablet by mouth Daily., Disp: , Rfl:     Synthroid 50 MCG tablet, TAKE 1 TABLET DAILY, Disp: 90 tablet, Rfl: 1    Allergies   Allergen Reactions    Codeine Nausea And Vomiting    Ibuprofen Anaphylaxis       Family History   Problem Relation Age of Onset    Arthritis Mother     Gout Mother     Parkinsonism Mother     Miscarriages / Stillbirths Mother         Miscarriage of second child    Lung cancer Father     Arthritis Father     Cancer Father     Depression Father     Mental illness Father     Lung cancer Maternal Aunt     Cancer Maternal Aunt     Uterine cancer Paternal Aunt 60    Cancer Paternal Aunt     Prostate cancer Paternal Uncle     Cancer Paternal Uncle     Melanoma Paternal Grandmother     Cancer Paternal Grandmother     Depression Paternal Grandmother     Liver disease Paternal Grandmother     Mental illness Paternal Grandmother     Prostate cancer Paternal Cousin     Melanoma Paternal Cousin     Esophageal cancer Paternal Cousin     Thyroid cancer Maternal Cousin 40    Esophageal cancer Maternal Cousin     Ovarian cancer Maternal Cousin 50    Heart disease Maternal Grandfather     Diabetes Maternal  Grandmother     Alcohol abuse Maternal Uncle     COPD Maternal Uncle     Heart disease Maternal Uncle     Heart disease Paternal Uncle     Liver disease Maternal Uncle        Cancer-related family history includes Cancer in her father, maternal aunt, paternal aunt, paternal grandmother, and paternal uncle; Esophageal cancer in her maternal cousin and paternal cousin; Lung cancer in her father and maternal aunt; Melanoma in her paternal cousin and paternal grandmother; Ovarian cancer (age of onset: 50) in her maternal cousin; Prostate cancer in her paternal cousin and paternal uncle; Thyroid cancer (age of onset: 40) in her maternal cousin; Uterine cancer (age of onset: 60) in her paternal aunt.    Social History     Tobacco Use    Smoking status: Never    Smokeless tobacco: Never   Vaping Use    Vaping status: Never Used   Substance Use Topics    Alcohol use: Not Currently     Comment: Very sporadically.  Not even once a month.  A glass of wine    Drug use: Never     I have reviewed and confirmed the accuracy of the patient's history: Chief complaint, HPI, ROS, and Subjective as entered by the MA/LPN/RN. Munirarachel Finney MD 04/16/24        SUBJECTIVE:    Complains of reflux symptoms mostly worse at night.  She also has occasional wheezing.            REVIEW OF SYSTEMS:    Review of Systems   Constitutional:  Negative for chills and fever.   HENT:  Negative for ear pain, mouth sores, nosebleeds and sore throat.    Eyes:  Negative for photophobia and visual disturbance.   Respiratory:  Negative for wheezing and stridor.    Cardiovascular:  Negative for chest pain and palpitations.   Gastrointestinal:  Negative for abdominal pain, diarrhea, nausea and vomiting.   Endocrine: Negative for cold intolerance and heat intolerance.   Genitourinary:  Negative for dysuria and hematuria.   Musculoskeletal:  Negative for joint swelling and neck stiffness.   Skin:  Negative for color change and rash.   Neurological:   "Negative for seizures and syncope.   Hematological:  Negative for adenopathy.        No obvious bleeding   Psychiatric/Behavioral:  Negative for agitation, confusion and hallucinations.      ROS as documented above    OBJECTIVE:    Vitals:    04/16/24 1200   BP: 133/81   Pulse: 99   Resp: 18   Temp: 98 °F (36.7 °C)   TempSrc: Infrared   SpO2: 93%   Weight: 99.3 kg (219 lb)   Height: 154.9 cm (61\")   PainSc: 0-No pain           ECOG    (1) Restricted in physically strenuous activity, ambulatory and able to do work of light nature    Physical Exam   Constitutional: She is oriented to person, place, and time. No distress.   HENT:   Head: Normocephalic and atraumatic.   Eyes: Conjunctivae are normal. Right eye exhibits no discharge. Left eye exhibits no discharge. No scleral icterus.   Neck: No thyromegaly present.   Cardiovascular: Normal rate, regular rhythm and normal heart sounds. Exam reveals no gallop and no friction rub.   Pulmonary/Chest: Effort normal. No stridor. No respiratory distress. She has no wheezes. Right breast exhibits no inverted nipple, no mass, no nipple discharge, no skin change and no tenderness. Left breast exhibits no inverted nipple, no mass, no nipple discharge, no skin change and no tenderness. No breast bleeding. Breasts are symmetrical.   Bilateral breast exam is negative  Bilateral axillary exam is negative   Abdominal: Soft. Bowel sounds are normal. She exhibits no mass. There is no abdominal tenderness. There is no rebound and no guarding.   Genitourinary: No breast bleeding.   Musculoskeletal: Normal range of motion. No tenderness.   Lymphadenopathy:     She has no cervical adenopathy.   Neurological: She is alert and oriented to person, place, and time. She exhibits normal muscle tone.   Skin: Skin is warm. No rash noted. She is not diaphoretic. No erythema.   Psychiatric: Her behavior is normal.   Nursing note and vitals reviewed.    Physical exam as documented above    I have " reexamined the patient and the results are consistent with the previously documented exam. Munira Finney MD      RECENT LABS    WBC   Date Value Ref Range Status   04/16/2024 8.05 3.40 - 10.80 10*3/mm3 Final     RBC   Date Value Ref Range Status   04/16/2024 4.50 3.77 - 5.28 10*6/mm3 Final     Hemoglobin   Date Value Ref Range Status   04/16/2024 12.2 12.0 - 15.9 g/dL Final     Hematocrit   Date Value Ref Range Status   04/16/2024 38.4 34.0 - 46.6 % Final     MCV   Date Value Ref Range Status   04/16/2024 85.3 79.0 - 97.0 fL Final     MCH   Date Value Ref Range Status   04/16/2024 27.1 26.6 - 33.0 pg Final     MCHC   Date Value Ref Range Status   04/16/2024 31.8 31.5 - 35.7 g/dL Final     RDW   Date Value Ref Range Status   04/16/2024 15.0 12.3 - 15.4 % Final     RDW-SD   Date Value Ref Range Status   04/16/2024 46.2 37.0 - 54.0 fl Final     MPV   Date Value Ref Range Status   04/16/2024 10.7 6.0 - 12.0 fL Final     Platelets   Date Value Ref Range Status   04/16/2024 198 140 - 450 10*3/mm3 Final     Neutrophil %   Date Value Ref Range Status   04/16/2024 49.3 42.7 - 76.0 % Final     Lymphocyte %   Date Value Ref Range Status   04/16/2024 38.3 19.6 - 45.3 % Final     Monocyte %   Date Value Ref Range Status   04/16/2024 10.6 5.0 - 12.0 % Final     Eosinophil %   Date Value Ref Range Status   04/16/2024 1.6 0.3 - 6.2 % Final     Basophil %   Date Value Ref Range Status   04/16/2024 0.2 0.0 - 1.5 % Final     Immature Grans %   Date Value Ref Range Status   03/10/2021 0.2 0.0 - 0.5 % Final     Neutrophils, Absolute   Date Value Ref Range Status   04/16/2024 3.97 1.70 - 7.00 10*3/mm3 Final     Lymphocytes, Absolute   Date Value Ref Range Status   04/16/2024 3.08 0.70 - 3.10 10*3/mm3 Final     Monocytes, Absolute   Date Value Ref Range Status   04/16/2024 0.85 0.10 - 0.90 10*3/mm3 Final     Eosinophils, Absolute   Date Value Ref Range Status   04/16/2024 0.13 0.00 - 0.40 10*3/mm3 Final     Basophils, Absolute    Date Value Ref Range Status   04/16/2024 0.02 0.00 - 0.20 10*3/mm3 Final     Immature Grans, Absolute   Date Value Ref Range Status   03/10/2021 0.02 0.00 - 0.05 10*3/mm3 Final     nRBC   Date Value Ref Range Status   03/18/2023 0.1 0.0 - 0.2 /100 WBC Final       Lab Results   Component Value Date    GLUCOSE 98 10/16/2023    BUN 24 (H) 10/16/2023    CREATININE 0.72 10/16/2023    EGFRIFNONA 76 02/14/2022    EGFRIFAFRI 85 11/10/2016    BCR 33.3 (H) 10/16/2023    K 4.1 10/16/2023    CO2 27.0 10/16/2023    CALCIUM 9.9 10/16/2023    PROTENTOTREF 6.2 02/16/2023    ALBUMIN 4.2 10/16/2023    LABIL2 1.4 02/16/2023    AST 22 10/16/2023    ALT 27 10/16/2023           ASSESSMENT:    T1b N0 M0 invasive ductal carcinoma, ER positive, MA positive, HER-2/hiro negative, grade III tumor.  Ongoing surveillance  Status post right lumpectomy with sentinel lymph node biopsy.  Reviewed  Status post adjuvant chemotherapy with Cytoxan and Taxotere for four cycles.    BRCA 2 sequencing gene with 3036 deletion 4 deleterious mutation.  Ongoing management  Status post right breast radiation.   Recurrent anemia: Work-up revealed iron deficiency.  Referred to Dr. Mayer April 2023.  Plan for EGD colonoscopy coming up next week.  Patient will continue oral iron supplementation.  She has had a hemoglobin response.  Hemoglobin is 12 g per DL  Postmenopausal hormonal status.   Status post robotic complete hysterectomy and adnexectomy with benign findings.    Hypercholesterolemia on treatment.  Lipid panel 2/4/19 was normal.   Arimidex initiated December 2014, discontinued October 2016 due to side effects.  Aromasin initiated November 2016.  Remains on Aromasin.  Continue Aromasin  Iron deficiency anemia, resolved.   Monoclonal gammopathy of unknown significance.  These has been zoraida;l for past 2 years. Will discontinue checking.  Bilateral hip pain.  Resolved        PLANS:    Patient has discontinued oral iron supplementation       Continue  Aromasin, Os-Quinton D till November 2024    MRI  breast be due April 2024.  This has been ordered today      Follow-up with GI for reflux symptoms    Bilateral screening mammogram will be due October 2024    CMP and mag level today    Abdominal MRI completed 4/25/2023: Normal.  Will be done every 2 years.  Patient to go follow-up with GI for EUS of the pancreas due this year 2024    Last bone density was normal 11/2/2023.  Next bone density will be due 11/2/2025    Status post colonoscopy and EGD by Dr. Mayer June 2023    Follow-up with dermatologist once a year: Reviewed Dr. Buck      Follow up with Dr. Mayer for EUS for pancreas due March 2024.         Continue monthly breast self exams and call for problems at any point in time.        She will follow up with Dr. Mayer for pancreatic cancer screening.  We discussed use of pancreatic MRI alternating with EUS.          Patient is five years out from her breast cancer diagnosis.  I did discuss prophylactic mastectomies.  At this time patient wants to continue with breast conservation.  I will continue Aromasin for her for breast cancer prevention.  She has not had any significant toxicities on this treatment.             I have reviewed labs results, imaging, vitals, and medications with the patient today.  Follow-up in 6 months with me or earlier as needed for problems         Patient verbalized understanding and is in agreement of the above plan.         I spent 40 total minutes, face-to-face, caring for Sindy today. 90% of this time involved counseling and/or coordination of care as documented within this note.

## 2024-04-16 ENCOUNTER — OFFICE VISIT (OUTPATIENT)
Dept: ONCOLOGY | Facility: CLINIC | Age: 64
End: 2024-04-16
Payer: COMMERCIAL

## 2024-04-16 ENCOUNTER — LAB (OUTPATIENT)
Dept: LAB | Facility: HOSPITAL | Age: 64
End: 2024-04-16
Payer: COMMERCIAL

## 2024-04-16 VITALS
TEMPERATURE: 98 F | BODY MASS INDEX: 41.35 KG/M2 | WEIGHT: 219 LBS | SYSTOLIC BLOOD PRESSURE: 133 MMHG | DIASTOLIC BLOOD PRESSURE: 81 MMHG | HEIGHT: 61 IN | OXYGEN SATURATION: 93 % | RESPIRATION RATE: 18 BRPM | HEART RATE: 99 BPM

## 2024-04-16 DIAGNOSIS — C50.911 MALIGNANT NEOPLASM OF RIGHT FEMALE BREAST, UNSPECIFIED ESTROGEN RECEPTOR STATUS, UNSPECIFIED SITE OF BREAST: Primary | ICD-10-CM

## 2024-04-16 LAB
ALBUMIN SERPL-MCNC: 4.5 G/DL (ref 3.5–5.2)
ALBUMIN/GLOB SERPL: 2 G/DL
ALP SERPL-CCNC: 115 U/L (ref 39–117)
ALT SERPL W P-5'-P-CCNC: 31 U/L (ref 1–33)
ANION GAP SERPL CALCULATED.3IONS-SCNC: 13 MMOL/L (ref 5–15)
AST SERPL-CCNC: 26 U/L (ref 1–32)
BASOPHILS # BLD AUTO: 0.02 10*3/MM3 (ref 0–0.2)
BASOPHILS NFR BLD AUTO: 0.2 % (ref 0–1.5)
BILIRUB SERPL-MCNC: 0.3 MG/DL (ref 0–1.2)
BUN SERPL-MCNC: 19 MG/DL (ref 8–23)
BUN/CREAT SERPL: 25 (ref 7–25)
CALCIUM SPEC-SCNC: 10.3 MG/DL (ref 8.6–10.5)
CHLORIDE SERPL-SCNC: 105 MMOL/L (ref 98–107)
CO2 SERPL-SCNC: 25 MMOL/L (ref 22–29)
CREAT SERPL-MCNC: 0.76 MG/DL (ref 0.57–1)
DEPRECATED RDW RBC AUTO: 46.2 FL (ref 37–54)
EGFRCR SERPLBLD CKD-EPI 2021: 87.1 ML/MIN/1.73
EOSINOPHIL # BLD AUTO: 0.13 10*3/MM3 (ref 0–0.4)
EOSINOPHIL NFR BLD AUTO: 1.6 % (ref 0.3–6.2)
ERYTHROCYTE [DISTWIDTH] IN BLOOD BY AUTOMATED COUNT: 15 % (ref 12.3–15.4)
GLOBULIN UR ELPH-MCNC: 2.2 GM/DL
GLUCOSE SERPL-MCNC: 103 MG/DL (ref 65–99)
HCT VFR BLD AUTO: 38.4 % (ref 34–46.6)
HGB BLD-MCNC: 12.2 G/DL (ref 12–15.9)
HOLD SPECIMEN: NORMAL
HOLD SPECIMEN: NORMAL
LYMPHOCYTES # BLD AUTO: 3.08 10*3/MM3 (ref 0.7–3.1)
LYMPHOCYTES NFR BLD AUTO: 38.3 % (ref 19.6–45.3)
MAGNESIUM SERPL-MCNC: 1.9 MG/DL (ref 1.6–2.4)
MCH RBC QN AUTO: 27.1 PG (ref 26.6–33)
MCHC RBC AUTO-ENTMCNC: 31.8 G/DL (ref 31.5–35.7)
MCV RBC AUTO: 85.3 FL (ref 79–97)
MONOCYTES # BLD AUTO: 0.85 10*3/MM3 (ref 0.1–0.9)
MONOCYTES NFR BLD AUTO: 10.6 % (ref 5–12)
NEUTROPHILS NFR BLD AUTO: 3.97 10*3/MM3 (ref 1.7–7)
NEUTROPHILS NFR BLD AUTO: 49.3 % (ref 42.7–76)
PLATELET # BLD AUTO: 198 10*3/MM3 (ref 140–450)
PMV BLD AUTO: 10.7 FL (ref 6–12)
POTASSIUM SERPL-SCNC: 4.5 MMOL/L (ref 3.5–5.2)
PROT SERPL-MCNC: 6.7 G/DL (ref 6–8.5)
RBC # BLD AUTO: 4.5 10*6/MM3 (ref 3.77–5.28)
SODIUM SERPL-SCNC: 143 MMOL/L (ref 136–145)
WBC NRBC COR # BLD AUTO: 8.05 10*3/MM3 (ref 3.4–10.8)

## 2024-04-16 PROCEDURE — 83735 ASSAY OF MAGNESIUM: CPT | Performed by: INTERNAL MEDICINE

## 2024-04-16 PROCEDURE — 85025 COMPLETE CBC W/AUTO DIFF WBC: CPT

## 2024-04-16 PROCEDURE — 36415 COLL VENOUS BLD VENIPUNCTURE: CPT

## 2024-04-16 PROCEDURE — 99215 OFFICE O/P EST HI 40 MIN: CPT | Performed by: INTERNAL MEDICINE

## 2024-04-16 PROCEDURE — 80053 COMPREHEN METABOLIC PANEL: CPT | Performed by: INTERNAL MEDICINE

## 2024-04-29 RX ORDER — ATORVASTATIN CALCIUM 20 MG/1
TABLET, FILM COATED ORAL
Qty: 90 TABLET | Refills: 1 | Status: SHIPPED | OUTPATIENT
Start: 2024-04-29

## 2024-05-03 ENCOUNTER — OFFICE (OUTPATIENT)
Dept: URBAN - METROPOLITAN AREA CLINIC 64 | Facility: CLINIC | Age: 64
End: 2024-05-03

## 2024-05-03 VITALS
HEIGHT: 61 IN | DIASTOLIC BLOOD PRESSURE: 81 MMHG | SYSTOLIC BLOOD PRESSURE: 142 MMHG | DIASTOLIC BLOOD PRESSURE: 80 MMHG | WEIGHT: 218 LBS | SYSTOLIC BLOOD PRESSURE: 140 MMHG | HEART RATE: 79 BPM

## 2024-05-03 DIAGNOSIS — R93.3 ABNORMAL FINDINGS ON DIAGNOSTIC IMAGING OF OTHER PARTS OF DI: ICD-10-CM

## 2024-05-03 DIAGNOSIS — R14.0 ABDOMINAL DISTENSION (GASEOUS): ICD-10-CM

## 2024-05-03 DIAGNOSIS — K21.9 GASTRO-ESOPHAGEAL REFLUX DISEASE WITHOUT ESOPHAGITIS: ICD-10-CM

## 2024-05-03 DIAGNOSIS — Z15.01 GENETIC SUSCEPTIBILITY TO MALIGNANT NEOPLASM OF BREAST: ICD-10-CM

## 2024-05-03 DIAGNOSIS — R06.02 SHORTNESS OF BREATH: ICD-10-CM

## 2024-05-03 PROCEDURE — 99214 OFFICE O/P EST MOD 30 MIN: CPT

## 2024-05-03 RX ORDER — FAMOTIDINE 40 MG/1
TABLET, FILM COATED ORAL
Qty: 90 | Refills: 2 | Status: ACTIVE

## 2024-05-09 DIAGNOSIS — C50.911 MALIGNANT NEOPLASM OF RIGHT FEMALE BREAST, UNSPECIFIED ESTROGEN RECEPTOR STATUS, UNSPECIFIED SITE OF BREAST: Primary | ICD-10-CM

## 2024-05-28 RX ORDER — MELOXICAM 15 MG/1
15 TABLET ORAL DAILY
Qty: 90 TABLET | Refills: 0 | Status: SHIPPED | OUTPATIENT
Start: 2024-05-28

## 2024-06-05 ENCOUNTER — TELEPHONE (OUTPATIENT)
Dept: ONCOLOGY | Facility: CLINIC | Age: 64
End: 2024-06-05

## 2024-06-05 NOTE — TELEPHONE ENCOUNTER
JUAN JOSE on pt's cell phone asking her to call the scheduling HUB to schedule her MRI. The HUB's phone number was included in this message.    The HUB made 3 attempts to reach patient w/o success as well. Message sent to Yoana asking her to send the pt a letter.

## 2024-06-12 ENCOUNTER — OFFICE VISIT (OUTPATIENT)
Dept: FAMILY MEDICINE CLINIC | Facility: CLINIC | Age: 64
End: 2024-06-12
Payer: COMMERCIAL

## 2024-06-12 ENCOUNTER — LAB (OUTPATIENT)
Dept: FAMILY MEDICINE CLINIC | Facility: CLINIC | Age: 64
End: 2024-06-12
Payer: COMMERCIAL

## 2024-06-12 VITALS
RESPIRATION RATE: 14 BRPM | DIASTOLIC BLOOD PRESSURE: 80 MMHG | SYSTOLIC BLOOD PRESSURE: 136 MMHG | WEIGHT: 215.6 LBS | HEART RATE: 83 BPM | BODY MASS INDEX: 40.7 KG/M2 | OXYGEN SATURATION: 96 % | HEIGHT: 61 IN

## 2024-06-12 DIAGNOSIS — E78.2 MIXED HYPERLIPIDEMIA: ICD-10-CM

## 2024-06-12 DIAGNOSIS — K21.00 GASTROESOPHAGEAL REFLUX DISEASE WITH ESOPHAGITIS WITHOUT HEMORRHAGE: ICD-10-CM

## 2024-06-12 DIAGNOSIS — I10 PRIMARY HYPERTENSION: ICD-10-CM

## 2024-06-12 DIAGNOSIS — C50.911 MALIGNANT NEOPLASM OF RIGHT FEMALE BREAST, UNSPECIFIED ESTROGEN RECEPTOR STATUS, UNSPECIFIED SITE OF BREAST: ICD-10-CM

## 2024-06-12 DIAGNOSIS — D50.8 IRON DEFICIENCY ANEMIA SECONDARY TO INADEQUATE DIETARY IRON INTAKE: ICD-10-CM

## 2024-06-12 DIAGNOSIS — E66.01 MORBID (SEVERE) OBESITY DUE TO EXCESS CALORIES: ICD-10-CM

## 2024-06-12 DIAGNOSIS — Z00.00 PREVENTATIVE HEALTH CARE: Primary | ICD-10-CM

## 2024-06-12 DIAGNOSIS — E03.9 ACQUIRED HYPOTHYROIDISM: ICD-10-CM

## 2024-06-12 DIAGNOSIS — F51.01 PRIMARY INSOMNIA: ICD-10-CM

## 2024-06-12 PROBLEM — G89.29 CHRONIC PAIN OF BOTH KNEES: Status: RESOLVED | Noted: 2022-11-10 | Resolved: 2024-06-12

## 2024-06-12 PROBLEM — N80.9 ENDOMETRIOSIS: Status: RESOLVED | Noted: 2023-10-16 | Resolved: 2024-06-12

## 2024-06-12 PROBLEM — G58.9 MONONEUROPATHY: Status: RESOLVED | Noted: 2022-05-11 | Resolved: 2024-06-12

## 2024-06-12 PROBLEM — M25.562 CHRONIC PAIN OF BOTH KNEES: Status: RESOLVED | Noted: 2022-11-10 | Resolved: 2024-06-12

## 2024-06-12 PROBLEM — M75.52 BURSITIS OF LEFT SHOULDER: Status: RESOLVED | Noted: 2017-06-21 | Resolved: 2024-06-12

## 2024-06-12 PROBLEM — J18.9 PNEUMONIA OF RIGHT MIDDLE LOBE DUE TO INFECTIOUS ORGANISM: Status: RESOLVED | Noted: 2023-04-17 | Resolved: 2024-06-12

## 2024-06-12 PROBLEM — M25.512 ACUTE PAIN OF LEFT SHOULDER: Status: RESOLVED | Noted: 2022-11-10 | Resolved: 2024-06-12

## 2024-06-12 PROBLEM — C80.1 MALIGNANT (PRIMARY) NEOPLASM, UNSPECIFIED: Status: RESOLVED | Noted: 2023-10-16 | Resolved: 2024-06-12

## 2024-06-12 PROBLEM — M54.12 CERVICAL RADICULOPATHY: Status: RESOLVED | Noted: 2017-01-18 | Resolved: 2024-06-12

## 2024-06-12 PROBLEM — M25.561 CHRONIC PAIN OF BOTH KNEES: Status: RESOLVED | Noted: 2022-11-10 | Resolved: 2024-06-12

## 2024-06-12 PROBLEM — R93.3 ABNORMAL FINDINGS ON DIAGNOSTIC IMAGING OF OTHER PARTS OF DIGESTIVE TRACT: Status: RESOLVED | Noted: 2023-10-16 | Resolved: 2024-06-12

## 2024-06-12 PROBLEM — D64.9 ANEMIA: Status: RESOLVED | Noted: 2023-10-16 | Resolved: 2024-06-12

## 2024-06-12 PROBLEM — K64.9 HEMORRHOIDS: Status: RESOLVED | Noted: 2023-10-16 | Resolved: 2024-06-12

## 2024-06-12 PROBLEM — K63.5 POLYP OF COLON: Status: RESOLVED | Noted: 2023-06-22 | Resolved: 2024-06-12

## 2024-06-12 PROBLEM — M70.61 TROCHANTERIC BURSITIS OF RIGHT HIP: Status: RESOLVED | Noted: 2020-07-19 | Resolved: 2024-06-12

## 2024-06-12 LAB
25(OH)D3 SERPL-MCNC: 37.3 NG/ML (ref 30–100)
ALBUMIN SERPL-MCNC: 4.4 G/DL (ref 3.5–5.2)
ALBUMIN/GLOB SERPL: 1.9 G/DL
ALP SERPL-CCNC: 104 U/L (ref 39–117)
ALT SERPL W P-5'-P-CCNC: 26 U/L (ref 1–33)
ANION GAP SERPL CALCULATED.3IONS-SCNC: 10 MMOL/L (ref 5–15)
AST SERPL-CCNC: 18 U/L (ref 1–32)
BACTERIA UR QL AUTO: ABNORMAL /HPF
BASOPHILS # BLD AUTO: 0.02 10*3/MM3 (ref 0–0.2)
BASOPHILS NFR BLD AUTO: 0.3 % (ref 0–1.5)
BILIRUB SERPL-MCNC: 0.2 MG/DL (ref 0–1.2)
BILIRUB UR QL STRIP: NEGATIVE
BUN SERPL-MCNC: 23 MG/DL (ref 8–23)
BUN/CREAT SERPL: 26.4 (ref 7–25)
CALCIUM SPEC-SCNC: 9.9 MG/DL (ref 8.6–10.5)
CHLORIDE SERPL-SCNC: 103 MMOL/L (ref 98–107)
CHOLEST SERPL-MCNC: 128 MG/DL (ref 0–200)
CLARITY UR: CLEAR
CO2 SERPL-SCNC: 28 MMOL/L (ref 22–29)
COD CRY URNS QL: PRESENT /HPF
COLOR UR: YELLOW
CREAT SERPL-MCNC: 0.87 MG/DL (ref 0.57–1)
DEPRECATED RDW RBC AUTO: 42.1 FL (ref 37–54)
EGFRCR SERPLBLD CKD-EPI 2021: 74 ML/MIN/1.73
EOSINOPHIL # BLD AUTO: 0.13 10*3/MM3 (ref 0–0.4)
EOSINOPHIL NFR BLD AUTO: 1.7 % (ref 0.3–6.2)
ERYTHROCYTE [DISTWIDTH] IN BLOOD BY AUTOMATED COUNT: 14.3 % (ref 12.3–15.4)
GLOBULIN UR ELPH-MCNC: 2.3 GM/DL
GLUCOSE SERPL-MCNC: 98 MG/DL (ref 65–99)
GLUCOSE UR STRIP-MCNC: NEGATIVE MG/DL
HBA1C MFR BLD: 6.2 % (ref 4.8–5.6)
HCT VFR BLD AUTO: 36.7 % (ref 34–46.6)
HDLC SERPL-MCNC: 44 MG/DL (ref 40–60)
HGB BLD-MCNC: 11.7 G/DL (ref 12–15.9)
HGB UR QL STRIP.AUTO: NEGATIVE
HOLD SPECIMEN: NORMAL
HYALINE CASTS UR QL AUTO: ABNORMAL /LPF
IMM GRANULOCYTES # BLD AUTO: 0.02 10*3/MM3 (ref 0–0.05)
IMM GRANULOCYTES NFR BLD AUTO: 0.3 % (ref 0–0.5)
KETONES UR QL STRIP: ABNORMAL
LDLC SERPL CALC-MCNC: 63 MG/DL (ref 0–100)
LDLC/HDLC SERPL: 1.39 {RATIO}
LEUKOCYTE ESTERASE UR QL STRIP.AUTO: ABNORMAL
LYMPHOCYTES # BLD AUTO: 2.89 10*3/MM3 (ref 0.7–3.1)
LYMPHOCYTES NFR BLD AUTO: 38.2 % (ref 19.6–45.3)
MCH RBC QN AUTO: 26.1 PG (ref 26.6–33)
MCHC RBC AUTO-ENTMCNC: 31.9 G/DL (ref 31.5–35.7)
MCV RBC AUTO: 81.9 FL (ref 79–97)
MONOCYTES # BLD AUTO: 0.86 10*3/MM3 (ref 0.1–0.9)
MONOCYTES NFR BLD AUTO: 11.4 % (ref 5–12)
MUCOUS THREADS URNS QL MICRO: ABNORMAL /HPF
NEUTROPHILS NFR BLD AUTO: 3.64 10*3/MM3 (ref 1.7–7)
NEUTROPHILS NFR BLD AUTO: 48.1 % (ref 42.7–76)
NITRITE UR QL STRIP: NEGATIVE
NRBC BLD AUTO-RTO: 0 /100 WBC (ref 0–0.2)
PH UR STRIP.AUTO: 6 [PH] (ref 5–8)
PLATELET # BLD AUTO: 176 10*3/MM3 (ref 140–450)
PMV BLD AUTO: 11.9 FL (ref 6–12)
POTASSIUM SERPL-SCNC: 4.4 MMOL/L (ref 3.5–5.2)
PROT SERPL-MCNC: 6.7 G/DL (ref 6–8.5)
PROT UR QL STRIP: NEGATIVE
RBC # BLD AUTO: 4.48 10*6/MM3 (ref 3.77–5.28)
RBC # UR STRIP: ABNORMAL /HPF
REF LAB TEST METHOD: ABNORMAL
SODIUM SERPL-SCNC: 141 MMOL/L (ref 136–145)
SP GR UR STRIP: 1.03 (ref 1–1.03)
SQUAMOUS #/AREA URNS HPF: ABNORMAL /HPF
T4 FREE SERPL-MCNC: 1.33 NG/DL (ref 0.92–1.68)
TRIGL SERPL-MCNC: 114 MG/DL (ref 0–150)
TSH SERPL DL<=0.05 MIU/L-ACNC: 1.93 UIU/ML (ref 0.27–4.2)
UROBILINOGEN UR QL STRIP: ABNORMAL
VIT B12 BLD-MCNC: 402 PG/ML (ref 211–946)
VLDLC SERPL-MCNC: 21 MG/DL (ref 5–40)
WBC # UR STRIP: ABNORMAL /HPF
WBC NRBC COR # BLD AUTO: 7.56 10*3/MM3 (ref 3.4–10.8)

## 2024-06-12 PROCEDURE — 81001 URINALYSIS AUTO W/SCOPE: CPT | Performed by: FAMILY MEDICINE

## 2024-06-12 PROCEDURE — 80050 GENERAL HEALTH PANEL: CPT | Performed by: FAMILY MEDICINE

## 2024-06-12 PROCEDURE — 36415 COLL VENOUS BLD VENIPUNCTURE: CPT | Performed by: FAMILY MEDICINE

## 2024-06-12 PROCEDURE — 82306 VITAMIN D 25 HYDROXY: CPT | Performed by: FAMILY MEDICINE

## 2024-06-12 PROCEDURE — 83036 HEMOGLOBIN GLYCOSYLATED A1C: CPT | Performed by: FAMILY MEDICINE

## 2024-06-12 PROCEDURE — 80061 LIPID PANEL: CPT | Performed by: FAMILY MEDICINE

## 2024-06-12 PROCEDURE — 84439 ASSAY OF FREE THYROXINE: CPT | Performed by: FAMILY MEDICINE

## 2024-06-12 PROCEDURE — 99397 PER PM REEVAL EST PAT 65+ YR: CPT | Performed by: FAMILY MEDICINE

## 2024-06-12 PROCEDURE — 82607 VITAMIN B-12: CPT | Performed by: FAMILY MEDICINE

## 2024-06-12 NOTE — PROGRESS NOTES
Chief Complaint  Annual Exam    Subjective        Sindy Martin presents to [facility]  History of Present Illness  The patient is a 63-year-old white female who came in today for her yearly checkup.    The patient reports overall good health, however, she is currently experiencing gastrointestinal issues. She was initiated on a new medication by Dr. Mayer's office last month, which she takes at night. She continues to take pantoprazole. In 01/2024, she underwent oral surgery and was prescribed medication for laughter gas. Following the surgery, she experienced severe vomiting, a symptom she had not previously encountered. She suspects a possible injury in the area. Post-surgery, she experienced severe dyspnea, pressure in the upper abdomen, and bloating. Despite no changes in her weight, she found her clothes uncomfortable. She resumed walking at the [facility], but was unable to walk around the track twice without stopping. She contacted Dr. Mayer's office, but was unavailable. The nurse practitioner prescribed a medication to take at night due to nocturnal reflux, which has significantly alleviated her symptoms. She is scheduled for an upper endoscopy next month to monitor her pancreas. She reports feeling better, but experiences discomfort when she eats certain foods. She has a follow-up appointment with Dr. Mayer in 08/2024.    The patient reports nocturnal leg pain, which does not occur simultaneously. She has been taking Aleve, but has reduced her intake and relies on lidocaine, Aspercreme, and heat patches. She may need to consult with Dr. Tompkins, who has administered 2 SI injections. Her sleep is disrupted due to her leg pain. She wears good walking shoes.    Supplemental Information  She had breast cancer 10 years ago. She sees Dr. Osorio every 6 months. She had a lumpectomy, radiation, and chemotherapy. The sentinel node was removed and it was negative.       Objective   Vital  "Signs:  /80   Pulse 83   Resp 14   Ht 154.9 cm (61\")   Wt 97.8 kg (215 lb 9.6 oz)   SpO2 96%   BMI 40.74 kg/m²   Estimated body mass index is 40.74 kg/m² as calculated from the following:    Height as of this encounter: 154.9 cm (61\").    Weight as of this encounter: 97.8 kg (215 lb 9.6 oz).       Class 3 Severe Obesity (BMI >=40). Obesity-related health conditions include the following: hypertension, dyslipidemias, and peripheral vascular disease. Obesity is improving with treatment. BMI is is above average; BMI management plan is completed. We discussed low calorie, low carb based diet program, portion control, increasing exercise, and joining a fitness center or start home based exercise program.      Physical Exam  Constitutional:       Appearance: Normal appearance. She is well-developed and normal weight.   HENT:      Head: Normocephalic and atraumatic.      Right Ear: Tympanic membrane, ear canal and external ear normal.      Left Ear: Tympanic membrane, ear canal and external ear normal.      Nose: Nose normal.      Mouth/Throat:      Mouth: Mucous membranes are moist.      Pharynx: Oropharynx is clear. No oropharyngeal exudate.   Eyes:      Extraocular Movements: Extraocular movements intact.      Conjunctiva/sclera: Conjunctivae normal.      Pupils: Pupils are equal, round, and reactive to light.   Cardiovascular:      Rate and Rhythm: Normal rate and regular rhythm.      Pulses: Normal pulses.      Heart sounds: Normal heart sounds.   Pulmonary:      Effort: Pulmonary effort is normal.      Breath sounds: Normal breath sounds.   Abdominal:      General: Bowel sounds are normal.      Palpations: Abdomen is soft.   Musculoskeletal:         General: Normal range of motion.      Cervical back: Normal range of motion and neck supple.   Skin:     General: Skin is warm and dry.   Neurological:      General: No focal deficit present.      Mental Status: She is alert and oriented to person, place, and " time. Mental status is at baseline.   Psychiatric:         Mood and Affect: Mood normal.         Behavior: Behavior normal.         Thought Content: Thought content normal.         Judgment: Judgment normal.        Result Review :  Results      Assessment & Plan  The patient is a 63-year-old white female who came in today for her yearly checkup.    1. Preventative healthcare.  The patient's overall health status is satisfactory. She is 10 years post-breast cancer, which was successfully treated with a lumpectomy, has shown significant improvement. A colonoscopy conducted last year yielded normal results. Her mammograms and DEXA scans are current, and she consults with Dr. Palma biannually. Laboratory tests will be conducted today, after which further treatment or evaluations will be determined.    2. Hyperlipidemia.  A lipid panel will be conducted today. She is currently on atorvastatin 20 mg, which will be adjusted as necessary.    3. Hypertension.  The patient's blood pressure reading in the office today is 136/80. Her current medication regimen will be continued, and her exercise regimen, primarily going to the [employer] and walking, along with resistance training, will be continued.    4. Hypothyroidism.  She takes Synthroid 50 mcg a day and does well with that. This will be continued indefinitely. TSH and T4 will be checked today.    5. Morbid obesity.  Her BMI was 40, and she follows a low carb diet and tries to exercise. This will be closely monitored, but she is working out and doing all she can to keep her weight down.    6. Gastroesophageal reflux disease.  She is currently on pantoprazole 40 mg, but occasionally experiences reflux. Another medication for the reflux has proven effective, but she is unsure of the name. She will provide the name of the medication later today.    7. Iron deficiency anemia.  She takes oral iron replacements, and a CBC and iron studies will be checked today.    8. Malignant  neoplasia of the right breast.  As mentioned above, the patient had breast cancer. This was managed with a lumpectomy, radiation, and chemotherapy. Since then, she has had no evidence of disease.    9. Primary insomnia.  She occasionally experiences trouble sleeping, but now that she is exercising, her sleep has improved, and she does not feel the need for any extra medication. She takes Lexapro 20 mg for anxiety, along with exercise, which seems to be enough to get her some rest. She wakes up most morning refreshed and ready to go.            Follow Up     Return in about 1 year (around 6/12/2025), or if symptoms worsen or fail to improve, for Annual physical-1 year.  Patient was given instructions and counseling regarding her condition or for health maintenance advice. Please see specific information pulled into the AVS if appropriate.     Patient or patient representative verbalized consent for the use of Ambient Listening during the visit with  Rubens Cali MD for chart documentation. 6/12/2024  09:56 EDT

## 2024-06-13 RX ORDER — FERROUS GLUCONATE 324(38)MG
324 TABLET ORAL
Qty: 90 TABLET | Refills: 3 | Status: SHIPPED | OUTPATIENT
Start: 2024-06-13

## 2024-06-13 RX ORDER — FAMOTIDINE 40 MG/1
40 TABLET, FILM COATED ORAL DAILY
Qty: 90 TABLET | Refills: 3 | Status: SHIPPED | OUTPATIENT
Start: 2024-06-13

## 2024-06-13 NOTE — PROGRESS NOTES
Tell Sindy to start taking an iron pill that I called in every day with breakfast with 4 ounces of orange juice.  In about 10 to 12 weeks I like her to repeat her CBC along with ferritin and iron studies.

## 2024-06-14 ENCOUNTER — TELEPHONE (OUTPATIENT)
Dept: FAMILY MEDICINE CLINIC | Facility: CLINIC | Age: 64
End: 2024-06-14
Payer: COMMERCIAL

## 2024-06-14 NOTE — PROGRESS NOTES
*changed to telephone note*  Left detailed voicemail for Sindy Martin as per verbal release. Advised Sindy Martin to call the office with any additional questions.

## 2024-06-14 NOTE — TELEPHONE ENCOUNTER
RELAY:    Left detailed voicemail for Sindy Martin as per verbal release. Advised Sindy Martin to call the office with any additional questions.      ----- Message from Rubens Cali sent at 6/13/2024  7:17 PM EDT -----  Tell Sindy to start taking an iron pill that I called in every day with breakfast with 4 ounces of orange juice.  In about 10 to 12 weeks I like her to repeat her CBC along with ferritin and iron studies.

## 2024-06-24 ENCOUNTER — HOSPITAL ENCOUNTER (OUTPATIENT)
Dept: MRI IMAGING | Facility: HOSPITAL | Age: 64
Discharge: HOME | End: 2024-06-24
Admitting: INTERNAL MEDICINE
Payer: COMMERCIAL

## 2024-06-24 DIAGNOSIS — C50.911 MALIGNANT NEOPLASM OF RIGHT FEMALE BREAST, UNSPECIFIED ESTROGEN RECEPTOR STATUS, UNSPECIFIED SITE OF BREAST: ICD-10-CM

## 2024-06-24 PROCEDURE — 77049 MRI BREAST C-+ W/CAD BI: CPT

## 2024-06-24 PROCEDURE — 25010000002 GADOTERIDOL PER 1 ML: Performed by: INTERNAL MEDICINE

## 2024-06-24 PROCEDURE — A9579 GAD-BASE MR CONTRAST NOS,1ML: HCPCS | Performed by: INTERNAL MEDICINE

## 2024-06-24 RX ADMIN — GADOTERIDOL 20 ML: 279.3 INJECTION, SOLUTION INTRAVENOUS at 13:43

## 2024-07-09 ENCOUNTER — ON CAMPUS - OUTPATIENT (OUTPATIENT)
Dept: URBAN - METROPOLITAN AREA HOSPITAL 85 | Facility: HOSPITAL | Age: 64
End: 2024-07-09

## 2024-07-09 ENCOUNTER — HOSPITAL ENCOUNTER (INPATIENT)
Facility: HOSPITAL | Age: 64
LOS: 2 days | Discharge: HOME | End: 2024-07-11
Attending: INTERNAL MEDICINE | Admitting: ANESTHESIOLOGY
Payer: COMMERCIAL

## 2024-07-09 ENCOUNTER — APPOINTMENT (OUTPATIENT)
Dept: GENERAL RADIOLOGY | Facility: HOSPITAL | Age: 64
End: 2024-07-09
Payer: COMMERCIAL

## 2024-07-09 ENCOUNTER — ANESTHESIA EVENT (OUTPATIENT)
Dept: GASTROENTEROLOGY | Facility: HOSPITAL | Age: 64
End: 2024-07-09
Payer: COMMERCIAL

## 2024-07-09 ENCOUNTER — ANESTHESIA (OUTPATIENT)
Dept: GASTROENTEROLOGY | Facility: HOSPITAL | Age: 64
End: 2024-07-09
Payer: COMMERCIAL

## 2024-07-09 DIAGNOSIS — R93.89 ABNORMAL FINDINGS ON IMAGING TEST: ICD-10-CM

## 2024-07-09 DIAGNOSIS — K29.50 UNSPECIFIED CHRONIC GASTRITIS WITHOUT BLEEDING: ICD-10-CM

## 2024-07-09 DIAGNOSIS — R93.89 ABNORMAL FINDINGS ON DIAGNOSTIC IMAGING OF OTHER SPECIFIED B: ICD-10-CM

## 2024-07-09 DIAGNOSIS — R09.02 HYPOXIA: Primary | ICD-10-CM

## 2024-07-09 DIAGNOSIS — K86.9 DISEASE OF PANCREAS, UNSPECIFIED: ICD-10-CM

## 2024-07-09 DIAGNOSIS — K44.9 DIAPHRAGMATIC HERNIA WITHOUT OBSTRUCTION OR GANGRENE: ICD-10-CM

## 2024-07-09 DIAGNOSIS — K31.7 POLYP OF STOMACH AND DUODENUM: ICD-10-CM

## 2024-07-09 LAB
ALBUMIN SERPL-MCNC: 4.3 G/DL (ref 3.5–5.2)
ALBUMIN/GLOB SERPL: 1.9 G/DL
ALP SERPL-CCNC: 115 U/L (ref 39–117)
ALT SERPL W P-5'-P-CCNC: 25 U/L (ref 1–33)
ANION GAP SERPL CALCULATED.3IONS-SCNC: 11.2 MMOL/L (ref 5–15)
AST SERPL-CCNC: 28 U/L (ref 1–32)
BASOPHILS # BLD AUTO: 0.01 10*3/MM3 (ref 0–0.2)
BASOPHILS NFR BLD AUTO: 0.1 % (ref 0–1.5)
BILIRUB SERPL-MCNC: 0.4 MG/DL (ref 0–1.2)
BUN SERPL-MCNC: 19 MG/DL (ref 8–23)
BUN/CREAT SERPL: 28.8 (ref 7–25)
CALCIUM SPEC-SCNC: 9.1 MG/DL (ref 8.6–10.5)
CHLORIDE SERPL-SCNC: 103 MMOL/L (ref 98–107)
CO2 SERPL-SCNC: 24.8 MMOL/L (ref 22–29)
CREAT SERPL-MCNC: 0.66 MG/DL (ref 0.57–1)
DEPRECATED RDW RBC AUTO: 49.3 FL (ref 37–54)
EGFRCR SERPLBLD CKD-EPI 2021: 97.5 ML/MIN/1.73
EOSINOPHIL # BLD AUTO: 0.07 10*3/MM3 (ref 0–0.4)
EOSINOPHIL NFR BLD AUTO: 0.6 % (ref 0.3–6.2)
ERYTHROCYTE [DISTWIDTH] IN BLOOD BY AUTOMATED COUNT: 16.2 % (ref 12.3–15.4)
GEN 5 2HR TROPONIN T REFLEX: 11 NG/L
GLOBULIN UR ELPH-MCNC: 2.3 GM/DL
GLUCOSE SERPL-MCNC: 118 MG/DL (ref 65–99)
HBA1C MFR BLD: 6.25 % (ref 4.8–5.6)
HCT VFR BLD AUTO: 38.5 % (ref 34–46.6)
HGB BLD-MCNC: 11.9 G/DL (ref 12–15.9)
IMM GRANULOCYTES # BLD AUTO: 0.04 10*3/MM3 (ref 0–0.05)
IMM GRANULOCYTES NFR BLD AUTO: 0.3 % (ref 0–0.5)
LYMPHOCYTES # BLD AUTO: 1.8 10*3/MM3 (ref 0.7–3.1)
LYMPHOCYTES NFR BLD AUTO: 14.2 % (ref 19.6–45.3)
MAGNESIUM SERPL-MCNC: 1.6 MG/DL (ref 1.6–2.4)
MCH RBC QN AUTO: 25.8 PG (ref 26.6–33)
MCHC RBC AUTO-ENTMCNC: 30.9 G/DL (ref 31.5–35.7)
MCV RBC AUTO: 83.5 FL (ref 79–97)
MONOCYTES # BLD AUTO: 0.72 10*3/MM3 (ref 0.1–0.9)
MONOCYTES NFR BLD AUTO: 5.7 % (ref 5–12)
NEUTROPHILS NFR BLD AUTO: 10.02 10*3/MM3 (ref 1.7–7)
NEUTROPHILS NFR BLD AUTO: 79.1 % (ref 42.7–76)
NRBC BLD AUTO-RTO: 0 /100 WBC (ref 0–0.2)
NT-PROBNP SERPL-MCNC: 209 PG/ML (ref 0–900)
PHOSPHATE SERPL-MCNC: 3.3 MG/DL (ref 2.5–4.5)
PLATELET # BLD AUTO: 176 10*3/MM3 (ref 140–450)
PMV BLD AUTO: 11.4 FL (ref 6–12)
POTASSIUM SERPL-SCNC: 3.8 MMOL/L (ref 3.5–5.2)
PROT SERPL-MCNC: 6.6 G/DL (ref 6–8.5)
RBC # BLD AUTO: 4.61 10*6/MM3 (ref 3.77–5.28)
SODIUM SERPL-SCNC: 139 MMOL/L (ref 136–145)
TROPONIN T DELTA: -1 NG/L
TROPONIN T SERPL HS-MCNC: 12 NG/L
WBC NRBC COR # BLD AUTO: 12.66 10*3/MM3 (ref 3.4–10.8)

## 2024-07-09 PROCEDURE — 25010000002 PROPOFOL 200 MG/20ML EMULSION

## 2024-07-09 PROCEDURE — 0DB78ZX EXCISION OF STOMACH, PYLORUS, VIA NATURAL OR ARTIFICIAL OPENING ENDOSCOPIC, DIAGNOSTIC: ICD-10-PCS | Performed by: INTERNAL MEDICINE

## 2024-07-09 PROCEDURE — 84484 ASSAY OF TROPONIN QUANT: CPT | Performed by: INTERNAL MEDICINE

## 2024-07-09 PROCEDURE — 25810000003 SODIUM CHLORIDE 0.9 % SOLUTION: Performed by: INTERNAL MEDICINE

## 2024-07-09 PROCEDURE — 83036 HEMOGLOBIN GLYCOSYLATED A1C: CPT | Performed by: INTERNAL MEDICINE

## 2024-07-09 PROCEDURE — 94799 UNLISTED PULMONARY SVC/PX: CPT

## 2024-07-09 PROCEDURE — 85025 COMPLETE CBC W/AUTO DIFF WBC: CPT | Performed by: INTERNAL MEDICINE

## 2024-07-09 PROCEDURE — 71045 X-RAY EXAM CHEST 1 VIEW: CPT

## 2024-07-09 PROCEDURE — 83735 ASSAY OF MAGNESIUM: CPT | Performed by: INTERNAL MEDICINE

## 2024-07-09 PROCEDURE — 25010000002 BUMETANIDE PER 0.5 MG: Performed by: INTERNAL MEDICINE

## 2024-07-09 PROCEDURE — 0DB68ZX EXCISION OF STOMACH, VIA NATURAL OR ARTIFICIAL OPENING ENDOSCOPIC, DIAGNOSTIC: ICD-10-PCS | Performed by: INTERNAL MEDICINE

## 2024-07-09 PROCEDURE — 94640 AIRWAY INHALATION TREATMENT: CPT

## 2024-07-09 PROCEDURE — 84100 ASSAY OF PHOSPHORUS: CPT | Performed by: INTERNAL MEDICINE

## 2024-07-09 PROCEDURE — 94761 N-INVAS EAR/PLS OXIMETRY MLT: CPT

## 2024-07-09 PROCEDURE — 43259 EGD US EXAM DUODENUM/JEJUNUM: CPT | Performed by: INTERNAL MEDICINE

## 2024-07-09 PROCEDURE — 88305 TISSUE EXAM BY PATHOLOGIST: CPT | Performed by: INTERNAL MEDICINE

## 2024-07-09 PROCEDURE — 94664 DEMO&/EVAL PT USE INHALER: CPT

## 2024-07-09 PROCEDURE — 80053 COMPREHEN METABOLIC PANEL: CPT | Performed by: INTERNAL MEDICINE

## 2024-07-09 PROCEDURE — 83880 ASSAY OF NATRIURETIC PEPTIDE: CPT | Performed by: INTERNAL MEDICINE

## 2024-07-09 PROCEDURE — 43239 EGD BIOPSY SINGLE/MULTIPLE: CPT | Mod: 59 | Performed by: INTERNAL MEDICINE

## 2024-07-09 RX ORDER — SODIUM CHLORIDE 0.9 % (FLUSH) 0.9 %
10 SYRINGE (ML) INJECTION AS NEEDED
Status: DISCONTINUED | OUTPATIENT
Start: 2024-07-09 | End: 2024-07-11 | Stop reason: HOSPADM

## 2024-07-09 RX ORDER — SODIUM CHLORIDE 0.9 % (FLUSH) 0.9 %
10 SYRINGE (ML) INJECTION EVERY 12 HOURS SCHEDULED
Status: DISCONTINUED | OUTPATIENT
Start: 2024-07-09 | End: 2024-07-11 | Stop reason: HOSPADM

## 2024-07-09 RX ORDER — ONDANSETRON 2 MG/ML
4 INJECTION INTRAMUSCULAR; INTRAVENOUS ONCE AS NEEDED
Status: DISCONTINUED | OUTPATIENT
Start: 2024-07-09 | End: 2024-07-09

## 2024-07-09 RX ORDER — BISACODYL 10 MG
10 SUPPOSITORY, RECTAL RECTAL DAILY PRN
Status: DISCONTINUED | OUTPATIENT
Start: 2024-07-09 | End: 2024-07-11 | Stop reason: HOSPADM

## 2024-07-09 RX ORDER — ACETAMINOPHEN 325 MG/1
650 TABLET ORAL EVERY 4 HOURS PRN
Status: DISCONTINUED | OUTPATIENT
Start: 2024-07-09 | End: 2024-07-11 | Stop reason: HOSPADM

## 2024-07-09 RX ORDER — PANTOPRAZOLE SODIUM 40 MG/10ML
40 INJECTION, POWDER, LYOPHILIZED, FOR SOLUTION INTRAVENOUS
Status: DISCONTINUED | OUTPATIENT
Start: 2024-07-10 | End: 2024-07-11 | Stop reason: HOSPADM

## 2024-07-09 RX ORDER — ACETAMINOPHEN 160 MG/5ML
650 SOLUTION ORAL EVERY 4 HOURS PRN
Status: DISCONTINUED | OUTPATIENT
Start: 2024-07-09 | End: 2024-07-11 | Stop reason: HOSPADM

## 2024-07-09 RX ORDER — ONDANSETRON 4 MG/1
4 TABLET, ORALLY DISINTEGRATING ORAL EVERY 6 HOURS PRN
Status: DISCONTINUED | OUTPATIENT
Start: 2024-07-09 | End: 2024-07-11 | Stop reason: HOSPADM

## 2024-07-09 RX ORDER — SODIUM CHLORIDE 9 MG/ML
50 INJECTION, SOLUTION INTRAVENOUS CONTINUOUS
Status: DISCONTINUED | OUTPATIENT
Start: 2024-07-09 | End: 2024-07-09

## 2024-07-09 RX ORDER — BUDESONIDE 0.5 MG/2ML
0.5 INHALANT ORAL
Status: DISCONTINUED | OUTPATIENT
Start: 2024-07-09 | End: 2024-07-11 | Stop reason: HOSPADM

## 2024-07-09 RX ORDER — BUMETANIDE 0.25 MG/ML
1 INJECTION INTRAMUSCULAR; INTRAVENOUS EVERY 12 HOURS
Status: DISCONTINUED | OUTPATIENT
Start: 2024-07-09 | End: 2024-07-10

## 2024-07-09 RX ORDER — AMOXICILLIN 250 MG
2 CAPSULE ORAL 2 TIMES DAILY PRN
Status: DISCONTINUED | OUTPATIENT
Start: 2024-07-09 | End: 2024-07-11 | Stop reason: HOSPADM

## 2024-07-09 RX ORDER — POLYETHYLENE GLYCOL 3350 17 G/17G
17 POWDER, FOR SOLUTION ORAL DAILY PRN
Status: DISCONTINUED | OUTPATIENT
Start: 2024-07-09 | End: 2024-07-11 | Stop reason: HOSPADM

## 2024-07-09 RX ORDER — ONDANSETRON 2 MG/ML
4 INJECTION INTRAMUSCULAR; INTRAVENOUS EVERY 6 HOURS PRN
Status: DISCONTINUED | OUTPATIENT
Start: 2024-07-09 | End: 2024-07-11 | Stop reason: HOSPADM

## 2024-07-09 RX ORDER — BISACODYL 5 MG/1
5 TABLET, DELAYED RELEASE ORAL DAILY PRN
Status: DISCONTINUED | OUTPATIENT
Start: 2024-07-09 | End: 2024-07-11 | Stop reason: HOSPADM

## 2024-07-09 RX ORDER — LIDOCAINE HYDROCHLORIDE 20 MG/ML
INJECTION, SOLUTION EPIDURAL; INFILTRATION; INTRACAUDAL; PERINEURAL AS NEEDED
Status: DISCONTINUED | OUTPATIENT
Start: 2024-07-09 | End: 2024-07-09 | Stop reason: SURG

## 2024-07-09 RX ORDER — IPRATROPIUM BROMIDE AND ALBUTEROL SULFATE 2.5; .5 MG/3ML; MG/3ML
3 SOLUTION RESPIRATORY (INHALATION) ONCE
Status: COMPLETED | OUTPATIENT
Start: 2024-07-09 | End: 2024-07-09

## 2024-07-09 RX ORDER — ACETAMINOPHEN 650 MG/1
650 SUPPOSITORY RECTAL EVERY 4 HOURS PRN
Status: DISCONTINUED | OUTPATIENT
Start: 2024-07-09 | End: 2024-07-11 | Stop reason: HOSPADM

## 2024-07-09 RX ORDER — IPRATROPIUM BROMIDE AND ALBUTEROL SULFATE 2.5; .5 MG/3ML; MG/3ML
3 SOLUTION RESPIRATORY (INHALATION)
Status: DISCONTINUED | OUTPATIENT
Start: 2024-07-09 | End: 2024-07-11 | Stop reason: HOSPADM

## 2024-07-09 RX ORDER — PROPOFOL 10 MG/ML
INJECTION, EMULSION INTRAVENOUS AS NEEDED
Status: DISCONTINUED | OUTPATIENT
Start: 2024-07-09 | End: 2024-07-09 | Stop reason: SURG

## 2024-07-09 RX ORDER — SODIUM CHLORIDE 9 MG/ML
40 INJECTION, SOLUTION INTRAVENOUS AS NEEDED
Status: DISCONTINUED | OUTPATIENT
Start: 2024-07-09 | End: 2024-07-11 | Stop reason: HOSPADM

## 2024-07-09 RX ADMIN — IPRATROPIUM BROMIDE AND ALBUTEROL SULFATE 3 ML: .5; 3 SOLUTION RESPIRATORY (INHALATION) at 11:44

## 2024-07-09 RX ADMIN — PROPOFOL 80 MG: 10 INJECTION, EMULSION INTRAVENOUS at 10:39

## 2024-07-09 RX ADMIN — LIDOCAINE HYDROCHLORIDE 80 MG: 20 INJECTION, SOLUTION EPIDURAL; INFILTRATION; INTRACAUDAL; PERINEURAL at 10:39

## 2024-07-09 RX ADMIN — Medication 10 ML: at 20:44

## 2024-07-09 RX ADMIN — SODIUM CHLORIDE 50 ML/HR: 9 INJECTION, SOLUTION INTRAVENOUS at 09:59

## 2024-07-09 RX ADMIN — IPRATROPIUM BROMIDE AND ALBUTEROL SULFATE 3 ML: .5; 3 SOLUTION RESPIRATORY (INHALATION) at 15:50

## 2024-07-09 RX ADMIN — BUMETANIDE 1 MG: 0.25 INJECTION INTRAMUSCULAR; INTRAVENOUS at 16:31

## 2024-07-09 RX ADMIN — PROPOFOL 50 MG: 10 INJECTION, EMULSION INTRAVENOUS at 10:44

## 2024-07-09 RX ADMIN — PROPOFOL 160 MCG/KG/MIN: 10 INJECTION, EMULSION INTRAVENOUS at 10:41

## 2024-07-09 RX ADMIN — IPRATROPIUM BROMIDE AND ALBUTEROL SULFATE 3 ML: .5; 3 SOLUTION RESPIRATORY (INHALATION) at 20:00

## 2024-07-09 RX ADMIN — PROPOFOL 20 MG: 10 INJECTION, EMULSION INTRAVENOUS at 10:40

## 2024-07-09 RX ADMIN — BUDESONIDE 0.5 MG: 0.5 INHALANT RESPIRATORY (INHALATION) at 20:05

## 2024-07-09 NOTE — PLAN OF CARE
Problem: Adult Inpatient Plan of Care  Goal: Plan of Care Review  Outcome: Ongoing, Progressing  Flowsheets (Taken 7/9/2024 1628)  Plan of Care Reviewed With: patient  Goal: Patient-Specific Goal (Individualized)  Outcome: Ongoing, Progressing  Flowsheets (Taken 7/9/2024 1628)  Patient-Specific Goals (Include Timeframe): Wean to room air by 7/10/2025   Goal Outcome Evaluation:  Plan of Care Reviewed With: patient   RT involved with patient, working to wean, IS at bedside. Pt progressing towards goals.

## 2024-07-09 NOTE — H&P
GI CONSULT  NOTE:    Referring Provider:    Rubens Cali MD Obert, Jonathan, MD    Chief complaint: <principal problem not specified>    Subjective .       Pre op diagnosis  Abnormal findings on imaging test [R93.89]  Genetic predisposition to pancreatic cancer      History of present illness:      Sindy Martin is a 63 y.o. female who presents today for Procedure(s):  ENDOSCOPIC ULTRASOUND for the indications listed below.     The updated Patient Profile was reviewed prior to the procedure, in conjunction with the Physical Exam, including medical conditions, surgical procedures, medications, allergies, family history and social history.     Pre-operatively, I reviewed the indication(s) for the procedure, the risks of the procedure [including but not limited to: unexpected bleeding possibly requiring hospitalization and/or unplanned repeat procedures, perforation possibly requiring surgical treatment, missed lesions and complications of sedation/MAC (also explained by anesthesia staff)].     I have evaluated the patient for risks associated with the planned anesthesia and the procedure to be performed and find the patient an acceptable candidate for IV sedation.    Multiple opportunities were provided for any questions or concerns, and all questions were answered satisfactorily before any anesthesia was administered. We will proceed with the planned procedure.    Past Medical History:  Past Medical History:   Diagnosis Date    Anesthesia complication     laughing gas caused vomiting    Depression     Hearing loss     Hypertension     Hypothyroidism     Metabolic syndrome        Past Surgical History:  Past Surgical History:   Procedure Laterality Date    APPENDECTOMY      BREAST BIOPSY      BREAST LUMPECTOMY      CARPAL TUNNEL RELEASE      CHOLECYSTECTOMY      COLONOSCOPY  2018 (?)    HYSTERECTOMY      LYMPH NODE BIOPSY  2014    Clarita node under left arm    OOPHORECTOMY      UPPER ENDOSCOPIC ULTRASOUND  W/ FNA N/A 04/28/2022    Procedure: EUS WITH BIOPSY;  Surgeon: Roldan Mayer MD;  Location: [facility] ENDOSCOPY;  Service: Gastroenterology;  Laterality: N/A;  HIATEL HERNIA, GASTRIC POLYP       Social History:  Social History     Tobacco Use    Smoking status: Never    Smokeless tobacco: Never   Vaping Use    Vaping status: Never Used   Substance Use Topics    Alcohol use: Not Currently     Comment: Very sporadically.  Not even once a month.  A glass of wine    Drug use: Never       Family History:  Family History   Problem Relation Age of Onset    Arthritis Mother     Gout Mother     Parkinsonism Mother     Miscarriages / Stillbirths Mother         Miscarriage of second child    Lung cancer Father     Arthritis Father     Cancer Father     Depression Father     Mental illness Father     Lung cancer Maternal Aunt     Cancer Maternal Aunt     Uterine cancer Paternal Aunt 60    Cancer Paternal Aunt     Prostate cancer Paternal Uncle     Cancer Paternal Uncle     Melanoma Paternal Grandmother     Cancer Paternal Grandmother     Depression Paternal Grandmother     Liver disease Paternal Grandmother     Mental illness Paternal Grandmother     Prostate cancer Paternal Cousin     Melanoma Paternal Cousin     Esophageal cancer Paternal Cousin     Thyroid cancer Maternal Cousin 40    Esophageal cancer Maternal Cousin     Ovarian cancer Maternal Cousin 50    Heart disease Maternal Grandfather     Diabetes Maternal Grandmother     Alcohol abuse Maternal Uncle     COPD Maternal Uncle     Heart disease Maternal Uncle     Heart disease Paternal Uncle     Liver disease Maternal Uncle        Medications:  Medications Prior to Admission   Medication Sig Dispense Refill Last Dose    ALOE VERA JUICE PO Take 2 oz by mouth 2 (Two) Times a Day.   7/8/2024    aspirin 81 MG tablet Take 1 tablet by mouth Daily.   Past Week    atorvastatin (LIPITOR) 20 MG tablet TAKE 1 TABLET DAILY 90 tablet 1 7/8/2024    buPROPion XL (WELLBUTRIN XL) 150 MG  "24 hr tablet Take 1 tablet by mouth Every Morning.   7/8/2024    escitalopram (LEXAPRO) 20 MG tablet Take 1 tablet by mouth Daily. 90 tablet 3 7/8/2024    exemestane (AROMASIN) 25 MG tablet TAKE 1 TABLET DAILY 90 tablet 3 7/8/2024    famotidine (PEPCID) 40 MG tablet Take 1 tablet by mouth Daily. 90 tablet 3 7/8/2024    ferrous gluconate (FERGON) 324 MG tablet Take 1 tablet by mouth Daily With Breakfast. Drink 4 ounces of orange juice right before taking this iron pill. 90 tablet 3 7/8/2024    loratadine (CLARITIN) 10 MG tablet Take 1 tablet by mouth Daily.   7/8/2024    Magnesium 250 MG tablet Take 2 tablets by mouth Daily.   7/8/2024    meloxicam (MOBIC) 15 MG tablet TAKE 1 TABLET BY MOUTH DAILY 90 tablet 0 7/8/2024    metFORMIN (GLUCOPHAGE) 500 MG tablet TAKE 2 TABLETS TWICE A  tablet 3 7/8/2024    multivitamin with minerals tablet tablet Take 1 tablet by mouth Daily.   7/8/2024    pantoprazole (PROTONIX) 40 MG EC tablet Take 1 tablet by mouth Daily.   7/8/2024    Synthroid 50 MCG tablet TAKE 1 TABLET DAILY 90 tablet 1 7/8/2024       Scheduled Meds:   Continuous Infusions:sodium chloride, 50 mL/hr, Last Rate: 50 mL/hr (07/09/24 0959)      PRN Meds:.    ALLERGIES:  Codeine and Ibuprofen    ROS:  The following systems were reviewed and negative;  Constitution:  No fevers, chills, no unintentional weight loss  Skin: no rash, no jaundice  Eyes:  No blurry vision, no eye pain  HENT:  No change in hearing or smell  Resp:  No dyspnea or cough  CV:  No chest pain or palpitations  :  No dysuria, hematuria  Musculoskeletal:  No leg cramps or arthralgias  Neuro:  No tremor, no numbness  Psych:  No depression or confsusion    Objective     Vital Signs:   Vitals:    06/26/24 1217 07/09/24 0946   BP:  155/77   BP Location:  Left arm   Patient Position:  Sitting   Pulse:  89   Resp:  17   Temp:  98.9 °F (37.2 °C)   TempSrc:  Oral   SpO2:  97%   Weight: 98 kg (216 lb) 98.1 kg (216 lb 4.3 oz)   Height: 154.9 cm (61\") " "154.9 cm (61\")       Physical Exam:       General Appearance:    Awake and alert, in no acute distress   Head:    Normocephalic, without obvious abnormality, atraumatic   Throat:   No oral lesions, no thrush, oral mucosa moist   Lungs:     respirations regular, even and unlabored   Skin:   No rash, no jaundice       Results Review:  Lab Results (last 24 hours)       ** No results found for the last 24 hours. **            Imaging Results (Last 24 Hours)       ** No results found for the last 24 hours. **             I reviewed the patient's labs and imaging.    ASSESSMENT AND PLAN:      Active Problems:    * No active hospital problems. *       Procedure(s):  ENDOSCOPIC ULTRASOUND      I discussed the patient's findings and my recommendations with the patient.    Roldan Mayer MD  07/09/24  10:18 EDT                "

## 2024-07-09 NOTE — OP NOTE
"ESOPHAGOGASTRODUODENOSCOPY WITH ULTRASOUND AND FINE NEEDLE ASPIRATION Procedure Report    Patient Name:  Sindy Martin  YOB: 1960    Date of Surgery:  7/9/2024     Pre-Op Diagnosis:  Abnormal findings on imaging test [R93.89]       Postop diagnosis:  1.  Sliding hiatal hernia  2.  Gastric polyp  3.  Abnormal pancreatic parenchyma  4.  Gastritis      Procedure/CPT® Codes:      Procedure(s):  Esophagogastroduodenoscopy with biopsy x 2 areas and ENDOSCOPIC ULTRASOUND    Staff:  Surgeon(s):  Roldan Mayer MD      Anesthesia: Monitored Anesthesia Care    Description of Procedure:  A description of the procedure as well as risks, benefits and alternative methods were explained to the patient who voiced understanding and signed the corresponding consent form. A physical exam was performed and vital signs were monitored throughout the procedure.    An upper GI endoscope was placed into the mouth and proceeded through the esophagus, stomach and second portion of the duodenum without difficulty. The scope was then retroflexed and the fundus was visualized. The procedure was not difficult and there were no immediate complications.    A pentex Radial echoendoscope was advanced into the mouth, esophagus, and into the stomach. The celiac axis was visualized, next the scope was used to visualize the pancreatic neck, body, and tail as well as the L lobe of the liver. The scope was advanced into the duodenal bulb where the pancreatic head and ampulla were visualized as well as the biliary tree and R lobe of the liver. The scope was then advanced to the second portion of the duodenum where the uncinate was brought into view and the ampulla in the \" kissing the papilla\" view. The scope was then withdrawn back into the stomach and out of the esophagus. There was no blood loss.    Impression:    EGD Findings:   1.  A few small gastric polyps that were 5 mm and sessile, cold forcep biopsies were taken.  The polyps " were in the distal gastric body on the greater curvature.  2.  Erythema in the antrum and prepyloric region consistent with gastritis, cold forcep biopsies of antrum body were taken for H. pylori  3.  Normal esophageal mucosa entire esophagus  4.  A very small sliding hiatal hernia was present in the cardia  5.  Normal duodenal mucosa visualized to D2    EUS Findings:   1.  A small hypoechoic area visualized in the tail body junction only measuring 3 mm, given its size, unclear of the significance of this.  This is too small to biopsy  2.  Otherwise normal EUS of the pancreas  3.  Normal biliary tree  4.  Normal ampulla  5.  Normal celiac axis  6.  Normal left and right lobe of the liver from what could be seen      Recommendations:  1. No NSAIDs or blood thinners for 7 days.   2. Follow up bx results   3.  Recommend MRI pancreatic mass protocol in 6 months to ensure that the very small 3 mm lesion does not grow which could indicate a pancreatic malignancy        Roldan Mayer MD     Date: 7/9/2024    Time: 11:47 EDT

## 2024-07-09 NOTE — ANESTHESIA POSTPROCEDURE EVALUATION
Patient: Sindy Martin    Procedure Summary       Date: 07/09/24 Room / Location: [facility] ENDOSCOPY 2 / [facility] END[facility]COPY    Anesthesia Start: 1032 Anesthesia Stop: 1105    Procedure: Esophagogastroduodenoscopy with biopsy x 2 areas and ENDOSCOPIC ULTRASOUND Diagnosis:       Abnormal findings on imaging test      (Abnormal findings on imaging test [R93.89])    Surgeons: Roldan Mayer MD Provider: Britt Hwang MD    Anesthesia Type: MAC ASA Status: 3            Anesthesia Type: MAC    Vitals  Vitals Value Taken Time   /85 07/09/24 1319   Temp     Pulse 98 07/09/24 1354   Resp 12 07/09/24 1145   SpO2 96 % 07/09/24 1354   Vitals shown include unfiled device data.        Post Anesthesia Care and Evaluation    Patient location during evaluation: PACU  Patient participation: complete - patient participated  Level of consciousness: awake and alert  Pain management: satisfactory to patient    Airway patency: patent  Anesthetic complications: No anesthetic complications  PONV Status: none  Cardiovascular status: acceptable  Respiratory status: acceptable  Hydration status: acceptable

## 2024-07-09 NOTE — H&P
History and Physical   Sindy Martin : 1960 MRN:[id number] LOS:0     Reason for admission: Hypoxia     Assessment / Plan     # Acute hypoxic respiratory failure in postprocedure setting  -Patient is in hospital for elective endoscopic procedure.  After that difficult time weaning of the oxygen.  She has shortness of breath as well.  -X-ray with mild pulmonary vascular congestion.  No history of COPD asthma heart condition.  -Patient mentioned that she has dyspnea and pressure sensation in the middle of the chest recently.  He has done stress test which was many years ago.  -CBC CMP magnesium and phosphate troponin BNP to be done.  Echo to be done.  EKG to be done.  -bumex BID for now   -stress test as OP to consider upon dc   -Respiratory treatment to be done     #Depression  -Resume home medication once verified by pharmacy and clinically appropriate    # Hypertension  -on bumex now     # Hypothyroidism  -Resume home medication once verified by pharmacy and clinically appropriate            Nutrition: NPO Diet NPO Type: Strict NPO     DVT Prophylaxis: [unfilled]     History of Present illness     A 63 y.o. old female patient with PMH of depression hypertension hypothyroidism and presents to the hospital for endoscopic procedure.  After the procedure patient has hypoxia and needed 2 L oxygen and cannot wean off.  Anesthesia team requested for admission.  Per the patient she has intermitted shortness of breath this year and she was thinking it is from the acid reflux.  Does not have asthma COPD heart condition underlying.  Chest x-ray with mild pulmonary vascular congestion.  All labs are pending.  Will get EKG and echo.  She will need outpatient pulmonologist appointment for sleep study.      Subjective / Review of systems     Review of Systems   Shortness of breath is a lot better    Past Medical/Surgical/Social/Family History & Allergies     Past Medical History:   Diagnosis Date    Anesthesia  complication     laughing gas caused vomiting    Depression     Hearing loss     Hypertension     Hypothyroidism     Metabolic syndrome       Past Surgical History:   Procedure Laterality Date    APPENDECTOMY      BREAST BIOPSY      BREAST LUMPECTOMY      CARPAL TUNNEL RELEASE      CHOLECYSTECTOMY      COLONOSCOPY  2018 (?)    HYSTERECTOMY      LYMPH NODE BIOPSY  2014    [place] node under left arm    OOPHORECTOMY      UPPER ENDOSCOPIC ULTRASOUND W/ FNA N/A 04/28/2022    Procedure: EUS WITH BIOPSY;  Surgeon: Roldan Mayer MD;  [facility] ENDOSCOPY;  Service: Gastroenterology;  Laterality: N/A;  HIATEL HERNIA, GASTRIC POLYP      Social History     Socioeconomic History    Marital status:    Tobacco Use    Smoking status: Never    Smokeless tobacco: Never   Vaping Use    Vaping status: Never Used   Substance and Sexual Activity    Alcohol use: Not Currently     Comment: Very sporadically.  Not even once a month.  A glass of wine    Drug use: Never    Sexual activity: Not Currently     Partners: Male     Birth control/protection: Post-menopausal, Hysterectomy     Comment: Had total hysterectomy - 2015      Family History   Problem Relation Age of Onset    Arthritis Mother     Gout Mother     Parkinsonism Mother     Miscarriages / Stillbirths Mother         Miscarriage of second child    Lung cancer Father     Arthritis Father     Cancer Father     Depression Father     Mental illness Father     Lung cancer Maternal Aunt     Cancer Maternal Aunt     Uterine cancer Paternal Aunt 60    Cancer Paternal Aunt     Prostate cancer Paternal Uncle     Cancer Paternal Uncle     Melanoma Paternal Grandmother     Cancer Paternal Grandmother     Depression Paternal Grandmother     Liver disease Paternal Grandmother     Mental illness Paternal Grandmother     Prostate cancer Paternal Cousin     Melanoma Paternal Cousin     Esophageal cancer Paternal Cousin     Thyroid cancer Maternal Cousin 40    Esophageal cancer  Maternal Cousin     Ovarian cancer Maternal Cousin 50    Heart disease Maternal Grandfather     Diabetes Maternal Grandmother     Alcohol abuse Maternal Uncle     COPD Maternal Uncle     Heart disease Maternal Uncle     Heart disease Paternal Uncle     Liver disease Maternal Uncle       Allergies   Allergen Reactions    Codeine Nausea And Vomiting    Ibuprofen Anaphylaxis        Home Medications     Prior to Admission medications    Medication Sig Start Date End Date Taking? Authorizing Provider   ALOE VERA JUICE PO Take 2 oz by mouth 2 (Two) Times a Day.   Yes Ashok Delong MD   aspirin 81 MG tablet Take 1 tablet by mouth Daily. 12/8/11  Yes Ashok Delong MD   atorvastatin (LIPITOR) 20 MG tablet TAKE 1 TABLET DAILY 4/29/24  Yes Rubens Cali MD   buPROPion XL (WELLBUTRIN XL) 150 MG 24 hr tablet Take 1 tablet by mouth Every Morning. 8/1/19  Yes Ashok Delong MD   escitalopram (LEXAPRO) 20 MG tablet Take 1 tablet by mouth Daily. 7/7/23  Yes Rubens Cali MD   exemestane (AROMASIN) 25 MG tablet TAKE 1 TABLET DAILY 7/18/23  Yes Munira Finney MD   famotidine (PEPCID) 40 MG tablet Take 1 tablet by mouth Daily. 6/13/24  Yes Rubens Cali MD   ferrous gluconate (FERGON) 324 MG tablet Take 1 tablet by mouth Daily With Breakfast. Drink 4 ounces of orange juice right before taking this iron pill. 6/13/24  Yes Rubens Cali MD   loratadine (CLARITIN) 10 MG tablet Take 1 tablet by mouth Daily. 4/1/12  Yes Ashok Delong MD   Magnesium 250 MG tablet Take 2 tablets by mouth Daily.   Yes Ashok Delong MD   meloxicam (MOBIC) 15 MG tablet TAKE 1 TABLET BY MOUTH DAILY 5/28/24  Yes Rubens Cali MD   metFORMIN (GLUCOPHAGE) 500 MG tablet TAKE 2 TABLETS TWICE A DAY 6/19/24  Yes Rubens Cali MD   multivitamin with minerals tablet tablet Take 1 tablet by mouth Daily. 1/1/17  Yes Ashok Delong MD   pantoprazole (PROTONIX) 40 MG EC tablet Take 1  "tablet by mouth Daily. 5/31/19  Yes Provider, MD Ashok   Synthroid 50 MCG tablet TAKE 1 TABLET DAILY 4/1/24  Yes Rubens Cali MD      Objective / Physical Exam   Vital signs:  Temp: 98.9 °F (37.2 °C)  BP: 143/94  Heart Rate: 99  Resp: 12  SpO2: 97 %  Weight: 98.1 kg (216 lb 4.3 oz)    Admission Weight: Weight: 98 kg (216 lb)    Physical Exam   Physical Exam  HENT:      Head: Normocephalic and atraumatic.      Nose: Nose normal.   Eyes:      Extraocular Movements: Extraocular movements intact.      Conjunctivae/sclera: Conjunctivae normal.      Pupils: Pupils are equal, round, and reactive to light.   Cardiovascular:      Rate and Rhythm: normal       Pulses: Normal pulses.      Heart sounds: Normal heart sounds.   Pulmonary:   Rhonchi bilaterally  Abdominal:      General: Abdomen is flat. Bowel sounds are normal.      Palpations: Abdomen is soft.   Musculoskeletal:         General: Normal range of motion.      Cervical back: Normal range of motion and neck supple.   Skin:     General: Skin is dry.   Neurological:      General: No focal deficit present.      Mental Status: alert.   Psychiatric:         Mood and Affect: Mood normal.        Labs         Invalid input(s): \"HBG\"          Current Medications   Scheduled Meds:      Continuous Infusions:sodium chloride, 50 mL/hr, Last Rate: Stopped (07/09/24 1105)         Jairo Cano MD  [facility] Medicine   07/09/24   13:50 EDT       "

## 2024-07-09 NOTE — NURSING NOTE
Assessment and admit profile complete. Pt satting well on 2L. No needs at this time, safety measures in place.

## 2024-07-09 NOTE — DISCHARGE INSTRUCTIONS
A responsible adult should stay with you and you should rest quietly for the rest of the day.    Do not drink alcohol, drive, operate any heavy machinery or power tools or make any legal/important decisions for the next 24 hours.     Progress your diet as tolerated.  If you begin to experience severe pain, increased shortness of breath, racing heartbeat or a fever above 101 F, seek immediate medical attention.     Follow up in the office as instructed. Call the office in one week for biopsy results. [phone]    EGD Findings:   1.  A few small gastric polyps that were 5 mm and sessile, cold forcep biopsies were taken.  The polyps were in the distal gastric body on the greater curvature.  2.  Erythema in the antrum and prepyloric region consistent with gastritis, cold forcep biopsies of antrum body were taken for H. pylori  3.  Normal esophageal mucosa entire esophagus  4.  A very small sliding hiatal hernia was present in the cardia  5.  Normal duodenal mucosa visualized to D2     EUS Findings:   1.  A small hypoechoic area visualized in the tail body junction only measuring 3 mm, given its size, unclear of the significance of this.  This is too small to biopsy  2.  Otherwise normal EUS of the pancreas  3.  Normal biliary tree  4.  Normal ampulla  5.  Normal celiac axis  6.  Normal left and right lobe of the liver from what could be seen        Recommendations:  1. No NSAIDs or blood thinners for 7 days.   2. Follow up bx results   3.  Recommend MRI pancreatic mass protocol in 6 months to ensure that the very small 3 mm lesion does not grow which could indicate a pancreatic malignancy

## 2024-07-09 NOTE — ANESTHESIA PREPROCEDURE EVALUATION
Anesthesia Evaluation     Patient summary reviewed and Nursing notes reviewed   history of anesthetic complications:  PONV  NPO Solid Status: > 8 hours  NPO Liquid Status: > 8 hours           Airway   Mallampati: I  TM distance: >3 FB  Neck ROM: full  No difficulty expected  Dental - normal exam     Comment: Upper and lower bridge    Pulmonary - negative pulmonary ROS and normal exam   Cardiovascular - normal exam    (+) hypertension, hyperlipidemia      Neuro/Psych  (+) syncope, numbness  GI/Hepatic/Renal/Endo    (+) morbid obesity, GERD, thyroid problem     Musculoskeletal     Abdominal  - normal exam    Bowel sounds: normal.   Substance History - negative use     OB/GYN negative ob/gyn ROS         Other   arthritis,   history of cancer remission                  Anesthesia Plan    ASA 3     MAC   total IV anesthesia  (AVAPS)  intravenous induction     Anesthetic plan, risks, benefits, and alternatives have been provided, discussed and informed consent has been obtained with: patient.    Plan discussed with CRNA.      CODE STATUS:

## 2024-07-10 ENCOUNTER — APPOINTMENT (OUTPATIENT)
Dept: CARDIOLOGY | Facility: HOSPITAL | Age: 64
End: 2024-07-10
Payer: COMMERCIAL

## 2024-07-10 ENCOUNTER — INPATIENT HOSPITAL (OUTPATIENT)
Dept: URBAN - METROPOLITAN AREA HOSPITAL 84 | Facility: HOSPITAL | Age: 64
End: 2024-07-10

## 2024-07-10 DIAGNOSIS — R93.3 ABNORMAL FINDINGS ON DIAGNOSTIC IMAGING OF OTHER PARTS OF DI: ICD-10-CM

## 2024-07-10 DIAGNOSIS — R06.00 DYSPNEA, UNSPECIFIED: ICD-10-CM

## 2024-07-10 DIAGNOSIS — Z90.49 ACQUIRED ABSENCE OF OTHER SPECIFIED PARTS OF DIGESTIVE TRACT: ICD-10-CM

## 2024-07-10 PROBLEM — R09.02 HYPOXIA: Status: RESOLVED | Noted: 2024-07-09 | Resolved: 2024-07-10

## 2024-07-10 LAB
ALBUMIN SERPL-MCNC: 4.4 G/DL (ref 3.5–5.2)
ALBUMIN/GLOB SERPL: 1.7 G/DL
ALP SERPL-CCNC: 107 U/L (ref 39–117)
ALT SERPL W P-5'-P-CCNC: 22 U/L (ref 1–33)
ANION GAP SERPL CALCULATED.3IONS-SCNC: 11.5 MMOL/L (ref 5–15)
AORTIC DIMENSIONLESS INDEX: 0.67 (DI)
AST SERPL-CCNC: 24 U/L (ref 1–32)
BASOPHILS # BLD AUTO: 0.03 10*3/MM3 (ref 0–0.2)
BASOPHILS NFR BLD AUTO: 0.2 % (ref 0–1.5)
BH CV ECHO MEAS - AO MAX PG: 10.9 MMHG
BH CV ECHO MEAS - AO MEAN PG: 6 MMHG
BH CV ECHO MEAS - AO V2 MAX: 165 CM/SEC
BH CV ECHO MEAS - AO V2 VTI: 28.5 CM
BH CV ECHO MEAS - AVA(I,D): 1.95 CM2
BH CV ECHO MEAS - EDV(CUBED): 110.6 ML
BH CV ECHO MEAS - EDV(MOD-SP2): 64.4 ML
BH CV ECHO MEAS - EDV(MOD-SP4): 75.6 ML
BH CV ECHO MEAS - EF(MOD-SP2): 59.8 %
BH CV ECHO MEAS - EF(MOD-SP4): 57 %
BH CV ECHO MEAS - ESV(CUBED): 35.9 ML
BH CV ECHO MEAS - ESV(MOD-SP2): 25.9 ML
BH CV ECHO MEAS - ESV(MOD-SP4): 32.5 ML
BH CV ECHO MEAS - FS: 31.3 %
BH CV ECHO MEAS - IVS/LVPW: 0.82 CM
BH CV ECHO MEAS - IVSD: 0.9 CM
BH CV ECHO MEAS - LA DIMENSION: 4.6 CM
BH CV ECHO MEAS - LAT PEAK E' VEL: 6.4 CM/SEC
BH CV ECHO MEAS - LV DIASTOLIC VOL/BSA (35-75): 38.7 CM2
BH CV ECHO MEAS - LV MASS(C)D: 170.2 GRAMS
BH CV ECHO MEAS - LV MAX PG: 4.8 MMHG
BH CV ECHO MEAS - LV MEAN PG: 2 MMHG
BH CV ECHO MEAS - LV SYSTOLIC VOL/BSA (12-30): 16.6 CM2
BH CV ECHO MEAS - LV V1 MAX: 110 CM/SEC
BH CV ECHO MEAS - LV V1 VTI: 17.7 CM
BH CV ECHO MEAS - LVIDD: 4.8 CM
BH CV ECHO MEAS - LVIDS: 3.3 CM
BH CV ECHO MEAS - LVOT AREA: 3.1 CM2
BH CV ECHO MEAS - LVOT DIAM: 2 CM
BH CV ECHO MEAS - LVPWD: 1.1 CM
BH CV ECHO MEAS - MED PEAK E' VEL: 7.2 CM/SEC
BH CV ECHO MEAS - MR MAX PG: 131.8 MMHG
BH CV ECHO MEAS - MR MAX VEL: 574 CM/SEC
BH CV ECHO MEAS - MR MEAN PG: 98 MMHG
BH CV ECHO MEAS - MR MEAN VEL: 481 CM/SEC
BH CV ECHO MEAS - MR VTI: 155 CM
BH CV ECHO MEAS - MV A DUR: 0.17 SEC
BH CV ECHO MEAS - MV A MAX VEL: 200 CM/SEC
BH CV ECHO MEAS - MV DEC SLOPE: 450 CM/SEC2
BH CV ECHO MEAS - MV DEC TIME: 0.2 SEC
BH CV ECHO MEAS - MV E MAX VEL: 141 CM/SEC
BH CV ECHO MEAS - MV E/A: 0.71
BH CV ECHO MEAS - MV MAX PG: 18.1 MMHG
BH CV ECHO MEAS - MV MEAN PG: 8 MMHG
BH CV ECHO MEAS - MV P1/2T: 106.1 MSEC
BH CV ECHO MEAS - MV V2 VTI: 50.1 CM
BH CV ECHO MEAS - MVA(P1/2T): 2.07 CM2
BH CV ECHO MEAS - MVA(VTI): 1.11 CM2
BH CV ECHO MEAS - PA ACC TIME: 0.06 SEC
BH CV ECHO MEAS - PA V2 MAX: 141 CM/SEC
BH CV ECHO MEAS - PULM A REVS DUR: 0.16 SEC
BH CV ECHO MEAS - PULM A REVS VEL: 36 CM/SEC
BH CV ECHO MEAS - PULM DIAS VEL: 33.2 CM/SEC
BH CV ECHO MEAS - PULM S/D: 1.41
BH CV ECHO MEAS - PULM SYS VEL: 46.9 CM/SEC
BH CV ECHO MEAS - RV MAX PG: 1.84 MMHG
BH CV ECHO MEAS - RV V1 MAX: 67.9 CM/SEC
BH CV ECHO MEAS - RV V1 VTI: 13.1 CM
BH CV ECHO MEAS - SV(LVOT): 55.6 ML
BH CV ECHO MEAS - SV(MOD-SP2): 38.5 ML
BH CV ECHO MEAS - SV(MOD-SP4): 43.1 ML
BH CV ECHO MEAS - SVI(LVOT): 28.4 ML/M2
BH CV ECHO MEAS - SVI(MOD-SP2): 19.7 ML/M2
BH CV ECHO MEAS - SVI(MOD-SP4): 22 ML/M2
BH CV ECHO MEAS - TAPSE (>1.6): 2.7 CM
BH CV ECHO MEAS - TR MAX PG: 30.5 MMHG
BH CV ECHO MEAS - TR MAX VEL: 276 CM/SEC
BH CV ECHO MEASUREMENTS AVERAGE E/E' RATIO: 20.74
BH CV XLRA - TDI S': 10.2 CM/SEC
BILIRUB SERPL-MCNC: 0.5 MG/DL (ref 0–1.2)
BUN SERPL-MCNC: 16 MG/DL (ref 8–23)
BUN/CREAT SERPL: 19.3 (ref 7–25)
CALCIUM SPEC-SCNC: 9.5 MG/DL (ref 8.6–10.5)
CHLORIDE SERPL-SCNC: 100 MMOL/L (ref 98–107)
CO2 SERPL-SCNC: 27.5 MMOL/L (ref 22–29)
CREAT SERPL-MCNC: 0.83 MG/DL (ref 0.57–1)
DEPRECATED RDW RBC AUTO: 49.5 FL (ref 37–54)
EGFRCR SERPLBLD CKD-EPI 2021: 78.3 ML/MIN/1.73
EOSINOPHIL # BLD AUTO: 0.05 10*3/MM3 (ref 0–0.4)
EOSINOPHIL NFR BLD AUTO: 0.4 % (ref 0.3–6.2)
ERYTHROCYTE [DISTWIDTH] IN BLOOD BY AUTOMATED COUNT: 16.3 % (ref 12.3–15.4)
GLOBULIN UR ELPH-MCNC: 2.6 GM/DL
GLUCOSE SERPL-MCNC: 192 MG/DL (ref 65–99)
HCT VFR BLD AUTO: 38.1 % (ref 34–46.6)
HGB BLD-MCNC: 11.6 G/DL (ref 12–15.9)
IMM GRANULOCYTES # BLD AUTO: 0.04 10*3/MM3 (ref 0–0.05)
IMM GRANULOCYTES NFR BLD AUTO: 0.3 % (ref 0–0.5)
LAB AP CASE REPORT: NORMAL
LEFT ATRIUM VOLUME INDEX: 43.5 ML/M2
LYMPHOCYTES # BLD AUTO: 2.38 10*3/MM3 (ref 0.7–3.1)
LYMPHOCYTES NFR BLD AUTO: 17.9 % (ref 19.6–45.3)
MAGNESIUM SERPL-MCNC: 1.9 MG/DL (ref 1.6–2.4)
MCH RBC QN AUTO: 25.2 PG (ref 26.6–33)
MCHC RBC AUTO-ENTMCNC: 30.4 G/DL (ref 31.5–35.7)
MCV RBC AUTO: 82.8 FL (ref 79–97)
MONOCYTES # BLD AUTO: 0.9 10*3/MM3 (ref 0.1–0.9)
MONOCYTES NFR BLD AUTO: 6.8 % (ref 5–12)
NEUTROPHILS NFR BLD AUTO: 74.4 % (ref 42.7–76)
NEUTROPHILS NFR BLD AUTO: 9.88 10*3/MM3 (ref 1.7–7)
NRBC BLD AUTO-RTO: 0 /100 WBC (ref 0–0.2)
PATH REPORT.FINAL DX SPEC: NORMAL
PATH REPORT.GROSS SPEC: NORMAL
PHOSPHATE SERPL-MCNC: 3.7 MG/DL (ref 2.5–4.5)
PLATELET # BLD AUTO: 186 10*3/MM3 (ref 140–450)
PMV BLD AUTO: 12 FL (ref 6–12)
POTASSIUM SERPL-SCNC: 3.3 MMOL/L (ref 3.5–5.2)
PROT SERPL-MCNC: 7 G/DL (ref 6–8.5)
RBC # BLD AUTO: 4.6 10*6/MM3 (ref 3.77–5.28)
SINUS: 3.1 CM
SODIUM SERPL-SCNC: 139 MMOL/L (ref 136–145)
STJ: 2.3 CM
WBC NRBC COR # BLD AUTO: 13.28 10*3/MM3 (ref 3.4–10.8)

## 2024-07-10 PROCEDURE — 80053 COMPREHEN METABOLIC PANEL: CPT | Performed by: INTERNAL MEDICINE

## 2024-07-10 PROCEDURE — 85025 COMPLETE CBC W/AUTO DIFF WBC: CPT | Performed by: INTERNAL MEDICINE

## 2024-07-10 PROCEDURE — 94664 DEMO&/EVAL PT USE INHALER: CPT

## 2024-07-10 PROCEDURE — 94762 N-INVAS EAR/PLS OXIMTRY CONT: CPT

## 2024-07-10 PROCEDURE — 99223 1ST HOSP IP/OBS HIGH 75: CPT | Performed by: NURSE PRACTITIONER

## 2024-07-10 PROCEDURE — 83735 ASSAY OF MAGNESIUM: CPT | Performed by: INTERNAL MEDICINE

## 2024-07-10 PROCEDURE — 94799 UNLISTED PULMONARY SVC/PX: CPT

## 2024-07-10 PROCEDURE — 94761 N-INVAS EAR/PLS OXIMETRY MLT: CPT

## 2024-07-10 PROCEDURE — 93306 TTE W/DOPPLER COMPLETE: CPT | Performed by: INTERNAL MEDICINE

## 2024-07-10 PROCEDURE — 93306 TTE W/DOPPLER COMPLETE: CPT

## 2024-07-10 PROCEDURE — 84100 ASSAY OF PHOSPHORUS: CPT | Performed by: INTERNAL MEDICINE

## 2024-07-10 PROCEDURE — 25010000002 BUMETANIDE PER 0.5 MG: Performed by: INTERNAL MEDICINE

## 2024-07-10 RX ORDER — BUMETANIDE 1 MG/1
1 TABLET ORAL
Status: DISCONTINUED | OUTPATIENT
Start: 2024-07-10 | End: 2024-07-11 | Stop reason: HOSPADM

## 2024-07-10 RX ORDER — ESCITALOPRAM OXALATE 10 MG/1
30 TABLET ORAL DAILY
Status: DISCONTINUED | OUTPATIENT
Start: 2024-07-10 | End: 2024-07-11 | Stop reason: HOSPADM

## 2024-07-10 RX ORDER — ATORVASTATIN CALCIUM 20 MG/1
20 TABLET, FILM COATED ORAL NIGHTLY
Status: DISCONTINUED | OUTPATIENT
Start: 2024-07-10 | End: 2024-07-11 | Stop reason: HOSPADM

## 2024-07-10 RX ORDER — FERROUS SULFATE 324(65)MG
324 TABLET, DELAYED RELEASE (ENTERIC COATED) ORAL
Status: DISCONTINUED | OUTPATIENT
Start: 2024-07-10 | End: 2024-07-11 | Stop reason: HOSPADM

## 2024-07-10 RX ORDER — LEVOTHYROXINE SODIUM 0.05 MG/1
50 TABLET ORAL
Status: DISCONTINUED | OUTPATIENT
Start: 2024-07-10 | End: 2024-07-11 | Stop reason: HOSPADM

## 2024-07-10 RX ORDER — BUPROPION HYDROCHLORIDE 150 MG/1
450 TABLET ORAL EVERY MORNING
Status: DISCONTINUED | OUTPATIENT
Start: 2024-07-10 | End: 2024-07-11 | Stop reason: HOSPADM

## 2024-07-10 RX ORDER — ESCITALOPRAM OXALATE 20 MG/1
30 TABLET ORAL DAILY
COMMUNITY
End: 2024-07-11 | Stop reason: HOSPADM

## 2024-07-10 RX ADMIN — IPRATROPIUM BROMIDE AND ALBUTEROL SULFATE 3 ML: .5; 3 SOLUTION RESPIRATORY (INHALATION) at 20:02

## 2024-07-10 RX ADMIN — IPRATROPIUM BROMIDE AND ALBUTEROL SULFATE 3 ML: .5; 3 SOLUTION RESPIRATORY (INHALATION) at 11:08

## 2024-07-10 RX ADMIN — IPRATROPIUM BROMIDE AND ALBUTEROL SULFATE 3 ML: .5; 3 SOLUTION RESPIRATORY (INHALATION) at 06:47

## 2024-07-10 RX ADMIN — ESCITALOPRAM OXALATE 30 MG: 10 TABLET ORAL at 09:15

## 2024-07-10 RX ADMIN — BUDESONIDE 0.5 MG: 0.5 INHALANT RESPIRATORY (INHALATION) at 06:51

## 2024-07-10 RX ADMIN — LEVOTHYROXINE SODIUM 50 MCG: 0.05 TABLET ORAL at 08:57

## 2024-07-10 RX ADMIN — PANTOPRAZOLE SODIUM 40 MG: 40 INJECTION, POWDER, FOR SOLUTION INTRAVENOUS at 08:57

## 2024-07-10 RX ADMIN — BUPROPION HYDROCHLORIDE 450 MG: 150 TABLET, EXTENDED RELEASE ORAL at 09:15

## 2024-07-10 RX ADMIN — Medication 10 ML: at 08:57

## 2024-07-10 RX ADMIN — BUDESONIDE 0.5 MG: 0.5 INHALANT RESPIRATORY (INHALATION) at 20:08

## 2024-07-10 RX ADMIN — ATORVASTATIN CALCIUM 20 MG: 20 TABLET, FILM COATED ORAL at 19:25

## 2024-07-10 RX ADMIN — BUMETANIDE 1 MG: 0.25 INJECTION INTRAMUSCULAR; INTRAVENOUS at 05:00

## 2024-07-10 RX ADMIN — BUMETANIDE 1 MG: 1 TABLET ORAL at 17:15

## 2024-07-10 RX ADMIN — FERROUS SULFATE TAB EC 324 MG (65 MG FE EQUIVALENT) 324 MG: 324 (65 FE) TABLET DELAYED RESPONSE at 08:57

## 2024-07-10 NOTE — PAYOR COMM NOTE
"PA FORM WITH CLINICALS FOR INPATIENT PRECERT:                AUTHORIZATION PENDING:   PLEASE CALL OR FAX DETERMINATION TO CONTACT BELOW. THANK YOU.        Evelyn Bustamante RN MSN  /UR  [facility]  [phone] office  [phone] fax  [email or web address]    [facility]  NPI: [id number]  Tax: [id number]            Sindy Martin (63 y.o. Female)       Date of Birth   1960    Social Security Number       Address   [address] [place] [address]    Home Phone   [phone]    MRN   [id number]       [employer]   [employer]    Marital Status                               Admission Date   7/9/24    Admission Type   Elective    Admitting Provider   Britt Hwang MD    Attending Provider   Juanis Tristan MD    Department, Room/Bed   [facility] 3C MEDICAL INPATIENT, 373/1       Discharge Date       Discharge Disposition       Discharge Destination                                 Attending Provider: Juanis Tristan MD    Allergies: Codeine, Ibuprofen    Isolation: None   Infection: None   Code Status: CPR    Ht: 154.9 cm (61\")   Wt: 98.8 kg (217 lb 13 oz)    Admission Cmt: None   Principal Problem: Hypoxia [R09.02]                   Active Insurance as of 7/9/2024       Primary Coverage       Payor Plan Insurance Group Employer/Plan Group    ANTH [employer] [facility] [employer] [facility] PPO [id number]       Payor Plan Address Payor Plan Phone Number Payor Plan Fax Number Effective Dates    [address] [phone]  1/1/2019 - None Entered    [facility] [id number]         Subscriber Name Subscriber Birth Date Member ID       CYNDI MARTIN 4/30/1961 [id number]                     Emergency Contacts        (Rel.) Home Phone Work Phone Mobile Phone    CLARECYNDI (Spouse) [phone] -- [phone]    MARTA WEATHERS (Friend) [phone] -- [phone]    Christiano Garcia (Relative) -- -- [phone]          07/09/24 1320  " Inpatient Admission  Once        Level of Care: Med/Surg  Diagnosis: Hypoxia [359555]  Isolate for COVID?: No  Certification: I Certify That Inpatient Hospital Services Are Medically Necessary For Greater Than 2 Midnights             History & Physical        Jairo Cano MD at 24 1349            History and Physical   Sindy Martin : 1960 MRN:[id number] LOS:0     Reason for admission: Hypoxia     Assessment / Plan     # Acute hypoxic respiratory failure in postprocedure setting  -Patient is in hospital for elective endoscopic procedure.  After that difficult time weaning of the oxygen.  She has shortness of breath as well.  -X-ray with mild pulmonary vascular congestion.  No history of COPD asthma heart condition.  -Patient mentioned that she has dyspnea and pressure sensation in the middle of the chest recently.  He has done stress test which was many years ago.  -CBC CMP magnesium and phosphate troponin BNP to be done.  Echo to be done.  EKG to be done.  -bumex BID for now   -stress test as OP to consider upon dc   -Respiratory treatment to be done     #Depression  -Resume home medication once verified by pharmacy and clinically appropriate    # Hypertension  -on bumex now     # Hypothyroidism  -Resume home medication once verified by pharmacy and clinically appropriate            Nutrition: NPO Diet NPO Type: Strict NPO     DVT Prophylaxis: [unfilled]     History of Present illness     A 63 y.o. old female patient with PMH of depression hypertension hypothyroidism and presents to the hospital for endoscopic procedure.  After the procedure patient has hypoxia and needed 2 L oxygen and cannot wean off.  Anesthesia team requested for admission.  Per the patient she has intermitted shortness of breath this year and she was thinking it is from the acid reflux.  Does not have asthma COPD heart condition underlying.  Chest x-ray with mild pulmonary vascular congestion.  All labs are pending.  Will  get EKG and echo.  She will need outpatient pulmonologist appointment for sleep study.      Subjective / Review of systems     Review of Systems   Shortness of breath is a lot better    Past Medical/Surgical/Social/Family History & Allergies     Past Medical History:   Diagnosis Date    Anesthesia complication     laughing gas caused vomiting    Depression     Hearing loss     Hypertension     Hypothyroidism     Metabolic syndrome       Past Surgical History:   Procedure Laterality Date    APPENDECTOMY      BREAST BIOPSY      BREAST LUMPECTOMY      CARPAL TUNNEL RELEASE      CHOLECYSTECTOMY      COLONOSCOPY  2018 (?)    HYSTERECTOMY      LYMPH NODE BIOPSY  2014    [place] node under left arm    OOPHORECTOMY      UPPER ENDOSCOPIC ULTRASOUND W/ FNA N/A 04/28/2022    Procedure: EUS WITH BIOPSY;  Surgeon: Roldan Mayer MD;  Location: [facility] ENDOSCOPY;  Service: Gastroenterology;  Laterality: N/A;  HIATEL HERNIA, GASTRIC POLYP      Social History     Socioeconomic History    Marital status:    Tobacco Use    Smoking status: Never    Smokeless tobacco: Never   Vaping Use    Vaping status: Never Used   Substance and Sexual Activity    Alcohol use: Not Currently     Comment: Very sporadically.  Not even once a month.  A glass of wine    Drug use: Never    Sexual activity: Not Currently     Partners: Male     Birth control/protection: Post-menopausal, Hysterectomy     Comment: Had total hysterectomy - 2015      Family History   Problem Relation Age of Onset    Arthritis Mother     Gout Mother     Parkinsonism Mother     Miscarriages / Stillbirths Mother         Miscarriage of second child    Lung cancer Father     Arthritis Father     Cancer Father     Depression Father     Mental illness Father     Lung cancer Maternal Aunt     Cancer Maternal Aunt     Uterine cancer Paternal Aunt 60    Cancer Paternal Aunt     Prostate cancer Paternal Uncle     Cancer Paternal Uncle     Melanoma Paternal Grandmother     Cancer  Paternal Grandmother     Depression Paternal Grandmother     Liver disease Paternal Grandmother     Mental illness Paternal Grandmother     Prostate cancer Paternal Cousin     Melanoma Paternal Cousin     Esophageal cancer Paternal Cousin     Thyroid cancer Maternal Cousin 40    Esophageal cancer Maternal Cousin     Ovarian cancer Maternal Cousin 50    Heart disease Maternal Grandfather     Diabetes Maternal Grandmother     Alcohol abuse Maternal Uncle     COPD Maternal Uncle     Heart disease Maternal Uncle     Heart disease Paternal Uncle     Liver disease Maternal Uncle       Allergies   Allergen Reactions    Codeine Nausea And Vomiting    Ibuprofen Anaphylaxis        Home Medications     Prior to Admission medications    Medication Sig Start Date End Date Taking? Authorizing Provider   ALOE VERA JUICE PO Take 2 oz by mouth 2 (Two) Times a Day.   Yes Ashok Delong MD   aspirin 81 MG tablet Take 1 tablet by mouth Daily. 12/8/11  Yes Ashok Delong MD   atorvastatin (LIPITOR) 20 MG tablet TAKE 1 TABLET DAILY 4/29/24  Yes Rubens Cali MD   buPROPion XL (WELLBUTRIN XL) 150 MG 24 hr tablet Take 1 tablet by mouth Every Morning. 8/1/19  Yes Ashok Delong MD   escitalopram (LEXAPRO) 20 MG tablet Take 1 tablet by mouth Daily. 7/7/23  Yes Rubens Cali MD   exemestane (AROMASIN) 25 MG tablet TAKE 1 TABLET DAILY 7/18/23  Yes Munira Finney MD   famotidine (PEPCID) 40 MG tablet Take 1 tablet by mouth Daily. 6/13/24  Yes Rubens Cali MD   ferrous gluconate (FERGON) 324 MG tablet Take 1 tablet by mouth Daily With Breakfast. Drink 4 ounces of orange juice right before taking this iron pill. 6/13/24  Yes Rubens Cali MD   loratadine (CLARITIN) 10 MG tablet Take 1 tablet by mouth Daily. 4/1/12  Yes Ashok Delong MD   Magnesium 250 MG tablet Take 2 tablets by mouth Daily.   Yes Ashok Delong MD   meloxicam (MOBIC) 15 MG tablet TAKE 1 TABLET BY MOUTH DAILY  "5/28/24  Yes Rubens Cali MD   metFORMIN (GLUCOPHAGE) 500 MG tablet TAKE 2 TABLETS TWICE A DAY 6/19/24  Yes Rubens Cali MD   multivitamin with minerals tablet tablet Take 1 tablet by mouth Daily. 1/1/17  Yes ProviderAshok MD   pantoprazole (PROTONIX) 40 MG EC tablet Take 1 tablet by mouth Daily. 5/31/19  Yes ProviderAshok MD   Synthroid 50 MCG tablet TAKE 1 TABLET DAILY 4/1/24  Yes Rubens Cali MD      Objective / Physical Exam   Vital signs:  Temp: 98.9 °F (37.2 °C)  BP: 143/94  Heart Rate: 99  Resp: 12  SpO2: 97 %  Weight: 98.1 kg (216 lb 4.3 oz)    Admission Weight: Weight: 98 kg (216 lb)    Physical Exam   Physical Exam  HENT:      Head: Normocephalic and atraumatic.      Nose: Nose normal.   Eyes:      Extraocular Movements: Extraocular movements intact.      Conjunctivae/sclera: Conjunctivae normal.      Pupils: Pupils are equal, round, and reactive to light.   Cardiovascular:      Rate and Rhythm: normal       Pulses: Normal pulses.      Heart sounds: Normal heart sounds.   Pulmonary:   Rhonchi bilaterally  Abdominal:      General: Abdomen is flat. Bowel sounds are normal.      Palpations: Abdomen is soft.   Musculoskeletal:         General: Normal range of motion.      Cervical back: Normal range of motion and neck supple.   Skin:     General: Skin is dry.   Neurological:      General: No focal deficit present.      Mental Status: alert.   Psychiatric:         Mood and Affect: Mood normal.        Labs         Invalid input(s): \"HBG\"          Current Medications   Scheduled Meds:      Continuous Infusions:sodium chloride, 50 mL/hr, Last Rate: Stopped (07/09/24 1105)         Jairo Cano MD  [facility]   07/09/24   13:50 EDT         Electronically signed by Jairo Cano MD at 07/10/24 0700       Roldan Mayer MD at 07/09/24 1018          GI CONSULT  NOTE:    Referring Provider:    Rubens Cali MD Obert, Jonathan, MD    Chief complaint: <principal problem not " specified>    Subjective.       Pre op diagnosis  Abnormal findings on imaging test [R93.89]  Genetic predisposition to pancreatic cancer      History of present illness:      Sindy Martin is a 63 y.o. female who presents today for Procedure(s):  ENDOSCOPIC ULTRASOUND for the indications listed below.     The updated Patient Profile was reviewed prior to the procedure, in conjunction with the Physical Exam, including medical conditions, surgical procedures, medications, allergies, family history and social history.     Pre-operatively, I reviewed the indication(s) for the procedure, the risks of the procedure [including but not limited to: unexpected bleeding possibly requiring hospitalization and/or unplanned repeat procedures, perforation possibly requiring surgical treatment, missed lesions and complications of sedation/MAC (also explained by anesthesia staff)].     I have evaluated the patient for risks associated with the planned anesthesia and the procedure to be performed and find the patient an acceptable candidate for IV sedation.    Multiple opportunities were provided for any questions or concerns, and all questions were answered satisfactorily before any anesthesia was administered. We will proceed with the planned procedure.    Past Medical History:  Past Medical History:   Diagnosis Date    Anesthesia complication     laughing gas caused vomiting    Depression     Hearing loss     Hypertension     Hypothyroidism     Metabolic syndrome        Past Surgical History:  Past Surgical History:   Procedure Laterality Date    APPENDECTOMY      BREAST BIOPSY      BREAST LUMPECTOMY      CARPAL TUNNEL RELEASE      CHOLECYSTECTOMY      COLONOSCOPY  2018 (?)    HYSTERECTOMY      LYMPH NODE BIOPSY  2014    [place] node under left arm    OOPHORECTOMY      UPPER ENDOSCOPIC ULTRASOUND W/ FNA N/A 04/28/2022    Procedure: EUS WITH BIOPSY;  Surgeon: Roldan Mayer MD;  Location: [facility] ENDOSCOPY;  Service:  Gastroenterology;  Laterality: N/A;  HIATEL HERNIA, GASTRIC POLYP       Social History:  Social History     Tobacco Use    Smoking status: Never    Smokeless tobacco: Never   Vaping Use    Vaping status: Never Used   Substance Use Topics    Alcohol use: Not Currently     Comment: Very sporadically.  Not even once a month.  A glass of wine    Drug use: Never       Family History:  Family History   Problem Relation Age of Onset    Arthritis Mother     Gout Mother     Parkinsonism Mother     Miscarriages / Stillbirths Mother         Miscarriage of second child    Lung cancer Father     Arthritis Father     Cancer Father     Depression Father     Mental illness Father     Lung cancer Maternal Aunt     Cancer Maternal Aunt     Uterine cancer Paternal Aunt 60    Cancer Paternal Aunt     Prostate cancer Paternal Uncle     Cancer Paternal Uncle     Melanoma Paternal Grandmother     Cancer Paternal Grandmother     Depression Paternal Grandmother     Liver disease Paternal Grandmother     Mental illness Paternal Grandmother     Prostate cancer Paternal Cousin     Melanoma Paternal Cousin     Esophageal cancer Paternal Cousin     Thyroid cancer Maternal Cousin 40    Esophageal cancer Maternal Cousin     Ovarian cancer Maternal Cousin 50    Heart disease Maternal Grandfather     Diabetes Maternal Grandmother     Alcohol abuse Maternal Uncle     COPD Maternal Uncle     Heart disease Maternal Uncle     Heart disease Paternal Uncle     Liver disease Maternal Uncle        Medications:  Medications Prior to Admission   Medication Sig Dispense Refill Last Dose    ALOE VERA JUICE PO Take 2 oz by mouth 2 (Two) Times a Day.   7/8/2024    aspirin 81 MG tablet Take 1 tablet by mouth Daily.   Past Week    atorvastatin (LIPITOR) 20 MG tablet TAKE 1 TABLET DAILY 90 tablet 1 7/8/2024    buPROPion XL (WELLBUTRIN XL) 150 MG 24 hr tablet Take 1 tablet by mouth Every Morning.   7/8/2024    escitalopram (LEXAPRO) 20 MG tablet Take 1 tablet by  "mouth Daily. 90 tablet 3 7/8/2024    exemestane (AROMASIN) 25 MG tablet TAKE 1 TABLET DAILY 90 tablet 3 7/8/2024    famotidine (PEPCID) 40 MG tablet Take 1 tablet by mouth Daily. 90 tablet 3 7/8/2024    ferrous gluconate (FERGON) 324 MG tablet Take 1 tablet by mouth Daily With Breakfast. Drink 4 ounces of orange juice right before taking this iron pill. 90 tablet 3 7/8/2024    loratadine (CLARITIN) 10 MG tablet Take 1 tablet by mouth Daily.   7/8/2024    Magnesium 250 MG tablet Take 2 tablets by mouth Daily.   7/8/2024    meloxicam (MOBIC) 15 MG tablet TAKE 1 TABLET BY MOUTH DAILY 90 tablet 0 7/8/2024    metFORMIN (GLUCOPHAGE) 500 MG tablet TAKE 2 TABLETS TWICE A  tablet 3 7/8/2024    multivitamin with minerals tablet tablet Take 1 tablet by mouth Daily.   7/8/2024    pantoprazole (PROTONIX) 40 MG EC tablet Take 1 tablet by mouth Daily.   7/8/2024    Synthroid 50 MCG tablet TAKE 1 TABLET DAILY 90 tablet 1 7/8/2024       Scheduled Meds:   Continuous Infusions:sodium chloride, 50 mL/hr, Last Rate: 50 mL/hr (07/09/24 0959)      PRN Meds:.    ALLERGIES:  Codeine and Ibuprofen    ROS:  The following systems were reviewed and negative;  Constitution:  No fevers, chills, no unintentional weight loss  Skin: no rash, no jaundice  Eyes:  No blurry vision, no eye pain  HENT:  No change in hearing or smell  Resp:  No dyspnea or cough  CV:  No chest pain or palpitations  :  No dysuria, hematuria  Musculoskeletal:  No leg cramps or arthralgias  Neuro:  No tremor, no numbness  Psych:  No depression or confsusion    Objective    Vital Signs:   Vitals:    06/26/24 1217 07/09/24 0946   BP:  155/77   BP Location:  Left arm   Patient Position:  Sitting   Pulse:  89   Resp:  17   Temp:  98.9 °F (37.2 °C)   TempSrc:  Oral   SpO2:  97%   Weight: 98 kg (216 lb) 98.1 kg (216 lb 4.3 oz)   Height: 154.9 cm (61\") 154.9 cm (61\")       Physical Exam:       General Appearance:    Awake and alert, in no acute distress   Head:    " Normocephalic, without obvious abnormality, atraumatic   Throat:   No oral lesions, no thrush, oral mucosa moist   Lungs:     respirations regular, even and unlabored   Skin:   No rash, no jaundice       Results Review:  Lab Results (last 24 hours)       ** No results found for the last 24 hours. **            Imaging Results (Last 24 Hours)       ** No results found for the last 24 hours. **             I reviewed the patient's labs and imaging.    ASSESSMENT AND PLAN:      Active Problems:    * No active hospital problems. *       Procedure(s):  ENDOSCOPIC ULTRASOUND      I discussed the patient's findings and my recommendations with the patient.    Roldan Mayer MD  07/09/24  10:18 EDT                  Electronically signed by Roldan Mayer MD at 07/09/24 1018       Emergency Department Notes    No notes of this type exist for this encounter.          Operative/Procedure Notes (all)        Procedures signed by Roldan Mayer MD at 07/09/24 1155   Version 1 of 1       Procedure Orders    1. Upper EUS [[id number]] ordered by Roldan Mayer MD at 07/09/24 1034               [Media Unavailable] Scan on 7/9/2024 1154 by Roldan Mayer MD: UPPEREUS          Electronically signed by Roldan Mayer MD at 07/09/24 1155       Roldan Mayer MD at 07/09/24 1040  Version 1 of 1         ESOPHAGOGASTRODUODENOSCOPY WITH ULTRASOUND AND FINE NEEDLE ASPIRATION Procedure Report    Patient Name:  Sindy Martin  YOB: 1960    Date of Surgery:  7/9/2024     Pre-Op Diagnosis:  Abnormal findings on imaging test [R93.89]       Postop diagnosis:  1.  Sliding hiatal hernia  2.  Gastric polyp  3.  Abnormal pancreatic parenchyma  4.  Gastritis      Procedure/CPT® Codes:      Procedure(s):  Esophagogastroduodenoscopy with biopsy x 2 areas and ENDOSCOPIC ULTRASOUND    Staff:  Surgeon(s):  Roldan Mayer MD      Anesthesia: Monitored Anesthesia Care    Description of Procedure:  A description of the procedure as  "well as risks, benefits and alternative methods were explained to the patient who voiced understanding and signed the corresponding consent form. A physical exam was performed and vital signs were monitored throughout the procedure.    An upper GI endoscope was placed into the mouth and proceeded through the esophagus, stomach and second portion of the duodenum without difficulty. The scope was then retroflexed and the fundus was visualized. The procedure was not difficult and there were no immediate complications.    A pentex Radial echoendoscope was advanced into the mouth, esophagus, and into the stomach. The celiac axis was visualized, next the scope was used to visualize the pancreatic neck, body, and tail as well as the L lobe of the liver. The scope was advanced into the duodenal bulb where the pancreatic head and ampulla were visualized as well as the biliary tree and R lobe of the liver. The scope was then advanced to the second portion of the duodenum where the uncinate was brought into view and the ampulla in the \" kissing the papilla\" view. The scope was then withdrawn back into the stomach and out of the esophagus. There was no blood loss.    Impression:    EGD Findings:   1.  A few small gastric polyps that were 5 mm and sessile, cold forcep biopsies were taken.  The polyps were in the distal gastric body on the greater curvature.  2.  Erythema in the antrum and prepyloric region consistent with gastritis, cold forcep biopsies of antrum body were taken for H. pylori  3.  Normal esophageal mucosa entire esophagus  4.  A very small sliding hiatal hernia was present in the cardia  5.  Normal duodenal mucosa visualized to D2    EUS Findings:   1.  A small hypoechoic area visualized in the tail body junction only measuring 3 mm, given its size, unclear of the significance of this.  This is too small to biopsy  2.  Otherwise normal EUS of the pancreas  3.  Normal biliary tree  4.  Normal ampulla  5.  Normal " celiac axis  6.  Normal left and right lobe of the liver from what could be seen      Recommendations:  1. No NSAIDs or blood thinners for 7 days.   2. Follow up bx results   3.  Recommend MRI pancreatic mass protocol in 6 months to ensure that the very small 3 mm lesion does not grow which could indicate a pancreatic malignancy        Roldan Mayer MD     Date: 7/9/2024    Time: 11:47 EDT          Electronically signed by Roldan Mayer MD at 07/09/24 1156

## 2024-07-10 NOTE — CASE MANAGEMENT/SOCIAL WORK
Discharge Planning Assessment   [place]     Patient Name: Sindy Martin  MRN: [id number]  Today's Date: 7/10/2024    Admit Date: 7/9/2024    Plan: Routine home.   Discharge Needs Assessment       Row Name 07/10/24 1145       Living Environment    People in Home spouse    Name(s) of People in Home -Mark    Current Living Arrangements home    Potentially Unsafe Housing Conditions none    In the past 12 months has the electric, gas, oil, or water company threatened to shut off services in your home? No    Primary Care Provided by self    Provides Primary Care For no one    Family Caregiver if Needed spouse    Family Caregiver Names Mark    Quality of Family Relationships helpful;involved;supportive    Able to Return to Prior Arrangements yes       Resource/Environmental Concerns    Resource/Environmental Concerns none    Transportation Concerns none       Transportation Needs    In the past 12 months, has lack of transportation kept you from medical appointments or from getting medications? no    In the past 12 months, has lack of transportation kept you from meetings, work, or from getting things needed for daily living? No       Food Insecurity    Within the past 12 months, you worried that your food would run out before you got the money to buy more. Never true    Within the past 12 months, the food you bought just didn't last and you didn't have money to get more. Never true       Transition Planning    Patient/Family Anticipates Transition to home;home with family    Patient/Family Anticipated Services at Transition none    Transportation Anticipated car, drives self;family or friend will provide       Discharge Needs Assessment    Readmission Within the Last 30 Days no previous admission in last 30 days    Equipment Currently Used at Home none    Concerns to be Addressed denies needs/concerns at this time;no discharge needs identified    Anticipated Changes Related to Illness none    Equipment Needed  After Discharge none    Provided Post Acute Provider List? N/A    Provided Post Acute Provider Quality & Resource List? N/A                   Discharge Plan       Row Name 07/10/24 1147       Plan    Plan Routine home.    Patient/Family in Agreement with Plan yes    Plan Comments CM met with patient, spouse, and friend at bedside. Pt lives at home, drives, and is independent with ADL's. PCP and pharmacy confirmed. Pt denies DME use and no HHC/PT services. No issues affording medications nor transportation concerns. Spouse Mark will provide transportation at discharge.                Demographic Summary       Row Name 07/10/24 1145       General Information    Admission Type inpatient    Arrived From emergency department    Referral Source admission list    Reason for Consult care coordination/care conference;discharge planning    Preferred Language English       Contact Information    Permission Granted to Share Info With                    Functional Status       Row Name 07/10/24 1142       Functional Status    Usual Activity Tolerance good    Current Activity Tolerance good       Functional Status, IADL    Medications independent    Meal Preparation independent    Housekeeping independent    Laundry independent    Shopping independent                Megan Naegele, RN     Office Phone: [phone]  Office Cell: [phone]

## 2024-07-10 NOTE — PROGRESS NOTES
.    [facility] MEDICINE SERVICE  DAILY PROGRESS NOTE    NAME: Sindy Martin  : 1960  MRN: [id number]      LOS: 1 day     PROVIDER OF SERVICE: Juanis Tristan MD    Chief Complaint: Hypoxia    Subjective:     Interval History:  History taken from: patient  Patient seen and examined at bedside this morning.   also present at bedside, patient was resting comfortably on room air but saturating 89 to 90%.  Will perform 6-minute walk test to see O2 requirements, patient still awaiting echocardiogram prior to discharge.  All questions and concerns addressed.    Review of Systems:   Review of Systems negative except as mentioned above.    Objective:     Vital Signs  Temp:  [98 °F (36.7 °C)-99.3 °F (37.4 °C)] 98.3 °F (36.8 °C)  Heart Rate:  [] 91  Resp:  [16-22] 18  BP: (119-147)/(66-81) 143/72  Flow (L/min):  [2-4] 3   Body mass index is 41.16 kg/m².    Physical Exam  Physical Exam  Head: Normocephalic and atraumatic.      Nose: Nose normal.   Eyes:      Extraocular Movements: Extraocular movements intact.      Conjunctivae/sclera: Conjunctivae normal.      Pupils: Pupils are equal, round, and reactive to light.   Cardiovascular:      Rate and Rhythm: normal       Pulses: Normal pulses.      Heart sounds: Normal heart sounds.   Pulmonary:   Rhonchi bilaterally  Abdominal:      General: Abdomen is flat. Bowel sounds are normal.      Palpations: Abdomen is soft.   Musculoskeletal:         General: Normal range of motion.      Cervical back: Normal range of motion and neck supple.   Skin:     General: Skin is dry.   Neurological:      General: No focal deficit present.      Mental Status: alert.   Psychiatric:         Mood and Affect: Mood normal.     Scheduled Meds   atorvastatin, 20 mg, Oral, Nightly  budesonide, 0.5 mg, Nebulization, BID - RT  bumetanide, 1 mg, Intravenous, Q12H  buPROPion XL, 450 mg, Oral, QAM  escitalopram, 30 mg, Oral, Daily  ferrous sulfate, 324 mg, Oral, Daily With  Breakfast  ipratropium-albuterol, 3 mL, Nebulization, 4x Daily - RT  levothyroxine, 50 mcg, Oral, Q AM  pantoprazole, 40 mg, Intravenous, QAM AC  sodium chloride, 10 mL, Intravenous, Q12H       PRN Meds     acetaminophen **OR** acetaminophen **OR** acetaminophen    senna-docusate sodium **AND** polyethylene glycol **AND** bisacodyl **AND** bisacodyl    Calcium Replacement - Follow Nurse / BPA Driven Protocol    Magnesium Cardiology Dose Replacement - Follow Nurse / BPA Driven Protocol    ondansetron ODT **OR** ondansetron    Phosphorus Replacement - Follow Nurse / BPA Driven Protocol    Potassium Replacement - Follow Nurse / BPA Driven Protocol    sodium chloride    sodium chloride   Infusions         Diagnostic Data    Results from last 7 days   Lab Units 07/10/24  0913   WBC 10*3/mm3 13.28*   HEMOGLOBIN g/dL 11.6*   HEMATOCRIT % 38.1   PLATELETS 10*3/mm3 186   GLUCOSE mg/dL 192*   CREATININE mg/dL 0.83   BUN mg/dL 16   SODIUM mmol/L 139   POTASSIUM mmol/L 3.3*   AST (SGOT) U/L 24   ALT (SGPT) U/L 22   ALK PHOS U/L 107   BILIRUBIN mg/dL 0.5   ANION GAP mmol/L 11.5       XR Chest 1 View    Result Date: 7/9/2024  Impression: 1.Suspect mild pulmonary vascular congestion. Electronically Signed: Efrem Lyles MD  7/9/2024 11:46 AM EDT  Workstation ID: [id number]       I reviewed the patient's new clinical results.    Assessment/Plan:     Active and Resolved Problems  Active Hospital Problems   No active problems to display.      Resolved Hospital Problems    Diagnosis Date Resolved POA    **Hypoxia [R09.02] 07/10/2024 Yes       # Acute hypoxic respiratory failure in postprocedure setting  -X-ray with mild pulmonary vascular congestion.  No history of COPD asthma heart condition.  -Has done stress test which was many years ago.  -CBC CMP magnesium and phosphate troponin BNP reviewed. Echo to be done.  EKG reviewed.   -bumex BID for now   -stress test as OP to consider upon dc   -Respiratory treatment to be done       #Depression  -Resume home medication once verified by pharmacy and clinically appropriate     # Hypertension  -on bumex now      # Hypothyroidism  -Resume home medication once verified by pharmacy and clinically appropriate       VTE Prophylaxis:  Mechanical VTE prophylaxis orders are present.         Code status is   Code Status and Medical Interventions:   Ordered at: 07/09/24 1407     Code Status (Patient has no pulse and is not breathing):    CPR (Attempt to Resuscitate)     Medical Interventions (Patient has pulse or is breathing):    Full Support       Plan for disposition: Home tomorrow.    Time: 30 minutes    Signature: Electronically signed by Juanis Tristan MD, 07/10/24, 15:17 EDT.  [facility] Hospitalist Team

## 2024-07-10 NOTE — DISCHARGE SUMMARY
.             [facility] Medicine Services  Discharge Summary    Date of Service: 2024  Patient Name: Sindy Martin  : 1960  MRN: [id number]    Date of Admission: 2024  Discharge Diagnosis: Acute dyspnea after EGD  Date of Discharge: 2024  Primary Care Physician: Rubens Cali MD      Presenting Problem:   Abnormal findings on imaging test [R93.89]  Hypoxia [R09.02]    Active and Resolved Hospital Problems:  Active Hospital Problems   No active problems to display.      Resolved Hospital Problems    Diagnosis POA    **Hypoxia [R09.02] Yes         Hospital Course     HPI:  A 63 y.o. old female patient with PMH of depression hypertension hypothyroidism and presents to the hospital for endoscopic procedure.  After the procedure patient has hypoxia and needed 2 L oxygen and cannot wean off.  Anesthesia team requested for admission.  Per the patient she has intermitted shortness of breath this year and she was thinking it is from the acid reflux.  Does not have asthma COPD heart condition underlying.  Chest x-ray with mild pulmonary vascular congestion.  All labs are pending.  Will get EKG and echo.  She will need outpatient pulmonologist appointment for sleep study.      Hospital Course:  Patient arrived  for elective EGD with GI, after the procedure she had hypoxia and required 2L O2 via NC.  Today patient is saturating well on room air and tolerating her diet, toileting and ambulating to the restroom reports she feels back to her baseline.  Last night pulse oximetry showed patient desaturated and required 2L O2 via NC.  She has been provided with home O2 orders on discharge and asked to follow-up with pulmonology and undergo sleep study.  Echocardiogram also performed and findings discussed with patient.  All questions and concerns addressed.        DISCHARGE Follow Up Recommendations for labs and diagnostics: Pulmonology, GI and PCP        Day of Discharge     Vital Signs:  Temp:   [98 °F (36.7 °C)-99.3 °F (37.4 °C)] 98.3 °F (36.8 °C)  Heart Rate:  [] 91  Resp:  [16-22] 18  BP: (119-147)/(66-81) 143/72  Flow (L/min):  [2-4] 3    Physical Exam:  Physical Exam    Head: Normocephalic and atraumatic.      Nose: Nose normal.   Eyes:      Extraocular Movements: Extraocular movements intact.      Conjunctivae/sclera: Conjunctivae normal.      Pupils: Pupils are equal, round, and reactive to light.   Cardiovascular:      Rate and Rhythm: normal       Pulses: Normal pulses.      Heart sounds: Normal heart sounds.   Pulmonary:   Rhonchi bilaterally  Abdominal:      General: Abdomen is flat. Bowel sounds are normal.      Palpations: Abdomen is soft.   Musculoskeletal:         General: Normal range of motion.      Cervical back: Normal range of motion and neck supple.   Skin:     General: Skin is dry.   Neurological:      General: No focal deficit present.      Mental Status: alert.   Psychiatric:         Mood and Affect: Mood normal.       Pertinent  and/or Most Recent Results     LAB RESULTS:      Lab 07/10/24  0913 07/09/24  1551   WBC 13.28* 12.66*   HEMOGLOBIN 11.6* 11.9*   HEMATOCRIT 38.1 38.5   PLATELETS 186 176   NEUTROS ABS 9.88* 10.02*   IMMATURE GRANS (ABS) 0.04 0.04   LYMPHS ABS 2.38 1.80   MONOS ABS 0.90 0.72   EOS ABS 0.05 0.07   MCV 82.8 83.5         Lab 07/10/24  0913 07/09/24  1551   SODIUM 139 139   POTASSIUM 3.3* 3.8   CHLORIDE 100 103   CO2 27.5 24.8   ANION GAP 11.5 11.2   BUN 16 19   CREATININE 0.83 0.66   EGFR 78.3 97.5   GLUCOSE 192* 118*   CALCIUM 9.5 9.1   MAGNESIUM 1.9 1.6   PHOSPHORUS 3.7 3.3   HEMOGLOBIN A1C  --  6.25*         Lab 07/10/24  0913 07/09/24  1551   TOTAL PROTEIN 7.0 6.6   ALBUMIN 4.4 4.3   GLOBULIN 2.6 2.3   ALT (SGPT) 22 25   AST (SGOT) 24 28   BILIRUBIN 0.5 0.4   ALK PHOS 107 115         Lab 07/09/24  1800 07/09/24  1551   PROBNP  --  209.0   HSTROP T 11 12                 Brief Urine Lab Results  (Last result in the past 365 days)        Color   Clarity    Blood   Leuk Est   Nitrite   Protein   CREAT   Urine HCG        06/12/24 1041 Yellow   Clear   Negative   Small (1+)   Negative   Negative                 Microbiology Results (last 10 days)       ** No results found for the last 240 hours. **            XR Chest 1 View    Result Date: 7/9/2024  Impression: Impression: 1.Suspect mild pulmonary vascular congestion. Electronically Signed: Efrem Lyles MD  7/9/2024 11:46 AM EDT  Workstation ID: [id number]    MRI Breast Bilateral Diagnostic W WO Contrast    Result Date: 6/24/2024  Impression: No mammographic signs of malignancy in either breast.  Recommend continued annual screening mammography and breast MRI.   ASSESSMENT: BI-RADS 2. Benign findings.      Electronically Signed By-Jocelyn Bettencourt MD On:6/24/2024 3:04 PM                   Labs Pending at Discharge:      Procedures Performed  Procedure(s):  Esophagogastroduodenoscopy with biopsy x 2 areas and ENDOSCOPIC ULTRASOUND  07/09 1034 Upper EUS      Consults:   Consults       No orders found for last 30 day(s).              Discharge Details        Discharge Medications        Continue These Medications        Instructions Start Date   ALOE VERA JUICE PO   2 oz, Oral, 2 Times Daily      aspirin 81 MG tablet   81 mg, Oral, Daily      atorvastatin 20 MG tablet  Commonly known as: LIPITOR   TAKE 1 TABLET DAILY      buPROPion  MG 24 hr tablet  Commonly known as: WELLBUTRIN XL   450 mg, Oral, Every Morning      escitalopram 20 MG tablet  Commonly known as: LEXAPRO   30 mg, Oral, Daily      exemestane 25 MG tablet  Commonly known as: AROMASIN   TAKE 1 TABLET DAILY      famotidine 40 MG tablet  Commonly known as: PEPCID   40 mg, Oral, Daily      ferrous gluconate 324 MG tablet  Commonly known as: FERGON   324 mg, Oral, Daily With Breakfast, Drink 4 ounces of orange juice right before taking this iron pill.      loratadine 10 MG tablet  Commonly known as: CLARITIN   10 mg, Oral, Daily      Magnesium 250 MG  tablet   500 mg, Oral, Daily      meloxicam 15 MG tablet  Commonly known as: MOBIC   15 mg, Oral, Daily      metFORMIN 500 MG tablet  Commonly known as: GLUCOPHAGE   TAKE 2 TABLETS TWICE A DAY      multivitamin with minerals tablet tablet   1 tablet, Oral, Daily      pantoprazole 40 MG EC tablet  Commonly known as: PROTONIX   40 mg, Oral, Daily      Synthroid 50 MCG tablet  Generic drug: levothyroxine   TAKE 1 TABLET DAILY               Allergies   Allergen Reactions    Codeine Nausea And Vomiting    Ibuprofen Anaphylaxis         Discharge Disposition: Home with family.  Home or Self Care    Diet:  Hospital:  Diet Order   Procedures    Diet: Regular/House; Fluid Consistency: Thin (IDDSI 0)         Discharge Activity:   Activity Instructions       Activity as Tolerated                CODE STATUS:  Code Status and Medical Interventions:   Ordered at: 07/09/24 1407     Code Status (Patient has no pulse and is not breathing):    CPR (Attempt to Resuscitate)     Medical Interventions (Patient has pulse or is breathing):    Full Support         Future Appointments   Date Time Provider Department Center   7/10/2024  2:50 PM J[facility] ECHO 3/INPATIENT  [facility]I [facility]   10/16/2024  1:40 PM LAB MD [facility] ONC LAB NA  LAG ONAL JIMMY   10/16/2024  1:45 PM Munira Finney MD [facility] ONC NA [facility]   9/4/2025  9:30 AM Rubens Cali MD [facility] PC FLKNB [facility]       Additional Instructions for the Follow-ups that You Need to Schedule       Discharge Follow-up with Specified Provider: GI nurse practitioner; 2 Months   As directed      To: GI nurse practitioner   Follow Up: 2 Months        Notify Physician or Go To The ED For the Following Conditions   As directed      Call GI or go to ER for post-operative fever, abdominal pain, or GI bleeding.    Order Comments: Call GI or go to ER for post-operative fever, abdominal pain, or GI bleeding.                 Time spent on Discharge including face to face service:  >30 minutes    Signature:  Electronically signed by Juanis Tristan MD, 07/10/24, 14:45 EDT.  [facility] Hospitalist Team

## 2024-07-10 NOTE — PROGRESS NOTES
LOS: 1 day   Patient Care Team:  Rubens Cali MD as PCP - General (Family Medicine)  Munira Finney MD as Consulting Physician (Hematology and Oncology)      Subjective     Interval History:     Subjective: Patient reports that she is feeling much better today.  Off oxygen and denies any dyspnea.  No abdominal pain.  No nausea/vomiting or overt GI bleeding.      ROS:   No chest pain, shortness of breath, or cough.        Medication Review:     Current Facility-Administered Medications:     acetaminophen (TYLENOL) tablet 650 mg, 650 mg, Oral, Q4H PRN **OR** acetaminophen (TYLENOL) 160 MG/5ML oral solution 650 mg, 650 mg, Oral, Q4H PRN **OR** acetaminophen (TYLENOL) suppository 650 mg, 650 mg, Rectal, Q4H PRN, Jairo Cano MD    atorvastatin (LIPITOR) tablet 20 mg, 20 mg, Oral, Nightly, Jairo Cano MD    sennosides-docusate (PERICOLACE) 8.6-50 MG per tablet 2 tablet, 2 tablet, Oral, BID PRN **AND** polyethylene glycol (MIRALAX) packet 17 g, 17 g, Oral, Daily PRN **AND** bisacodyl (DULCOLAX) EC tablet 5 mg, 5 mg, Oral, Daily PRN **AND** bisacodyl (DULCOLAX) suppository 10 mg, 10 mg, Rectal, Daily PRN, Jairo Cano MD    budesonide (PULMICORT) nebulizer solution 0.5 mg, 0.5 mg, Nebulization, BID - RT, Jairo Cano MD, 0.5 mg at 07/10/24 0651    bumetanide (BUMEX) injection 1 mg, 1 mg, Intravenous, Q12H, Jairo Cano MD, 1 mg at 07/10/24 0500    buPROPion XL (WELLBUTRIN XL) 24 hr tablet 450 mg, 450 mg, Oral, QAM, Jairo Cano MD, 450 mg at 07/10/24 0915    Calcium Replacement - Follow Nurse / BPA Driven Protocol, , Does not apply, PRN, Jairo Cano MD    escitalopram (LEXAPRO) tablet 30 mg, 30 mg, Oral, Daily, Oo, Yadana, MD, 30 mg at 07/10/24 0915    ferrous sulfate EC tablet 324 mg, 324 mg, Oral, Daily With Breakfast, Jairo Cano MD, 324 mg at 07/10/24 0857    ipratropium-albuterol (DUO-NEB) nebulizer solution 3 mL, 3 mL, Nebulization, 4x Daily - RT, Jairo Cano MD, 3 mL at 07/10/24 1108    levothyroxine  (SYNTHROID, LEVOTHROID) tablet 50 mcg, 50 mcg, Oral, Q AM, Jairo Cano MD, 50 mcg at 07/10/24 0857    Magnesium Cardiology Dose Replacement - Follow Nurse / BPA Driven Protocol, , Does not apply, Jerome LONDONO Yadana, MD    ondansetron ODT (ZOFRAN-ODT) disintegrating tablet 4 mg, 4 mg, Oral, Q6H PRN **OR** ondansetron (ZOFRAN) injection 4 mg, 4 mg, Intravenous, Q6H PRNJerome Yadana, MD    pantoprazole (PROTONIX) injection 40 mg, 40 mg, Intravenous, QAM AC, Jairo Cano MD, 40 mg at 07/10/24 0857    Phosphorus Replacement - Follow Nurse / BPA Driven Protocol, , Does not apply, Jerome LONDONO Yadana, MD    Potassium Replacement - Follow Nurse / BPA Driven Protocol, , Does not apply, Jerome LONDONO Yadana, MD    sodium chloride 0.9 % flush 10 mL, 10 mL, Intravenous, Q12H, Jairo Cano MD, 10 mL at 07/10/24 0857    sodium chloride 0.9 % flush 10 mL, 10 mL, Intravenous, PRNJerome Yadana, MD    sodium chloride 0.9 % infusion 40 mL, 40 mL, Intravenous, Jerome LONDONO Yadana, MD    Facility-Administered Medications Ordered in Other Encounters:     acetaminophen (TYLENOL) tablet 650 mg, 650 mg, Oral, Q4H PRN **OR** acetaminophen (TYLENOL) 160 MG/5ML oral solution 650 mg, 650 mg, Oral, Q4H PRN **OR** acetaminophen (TYLENOL) suppository 660 mg, 660 mg, Rectal, Q4H PRNJerome Yadana, MD    aluminum-magnesium hydroxide-simethicone (MAALOX MAX) 400-400-40 MG/5ML suspension 15 mL, 15 mL, Oral, Q6H PRN, Jairo Cano MD    [DISCONTINUED] sennosides-docusate (PERICOLACE) 8.6-50 MG per tablet 2 tablet, 2 tablet, Oral, BID PRN **AND** [DISCONTINUED] polyethylene glycol (MIRALAX) packet 17 g, 17 g, Oral, Daily PRN **AND** [DISCONTINUED] bisacodyl (DULCOLAX) EC tablet 5 mg, 5 mg, Oral, Daily PRN **AND** bisacodyl (DULCOLAX) suppository 10 mg, 10 mg, Rectal, Daily PRN, Jairo Cano MD    dextrose (D50W) (25 g/50 mL) IV injection 25 g, 25 g, Intravenous, Q15 Min PRN, Jairo Cano MD    dextrose (GLUTOSE) oral gel 15 g, 15 g, Oral, Q15 Min PRN, Jairo Cano MD    glucagon  (GLUCAGEN) injection 1 mg, 1 mg, Intramuscular, Q15 Min PRN, Jairo Cano MD    insulin lispro (HUMALOG/ADMELOG) injection 2-7 Units, 2-7 Units, Subcutaneous, 4x Daily AC & at Bedtime, Jairo Cano MD    ipratropium-albuterol (DUO-NEB) nebulizer solution 3 mL, 3 mL, Nebulization, 4x Daily - RT, Jairo Cano MD    melatonin tablet 5 mg, 5 mg, Oral, Nightly PRN, Jairo Cano MD    Potassium Replacement - Follow Nurse / BPA Driven Protocol, , Does not apply, PRN, Jairo Cano MD    sodium chloride 0.9 % flush 10 mL, 10 mL, Intravenous, Q12H, Jairo Cano MD    sodium chloride 0.9 % flush 10 mL, 10 mL, Intravenous, BRY, Jairo Cano MD    sodium chloride 0.9 % infusion 40 mL, 40 mL, Intravenous, BRY, Jairo Cano MD      Objective     Vital Signs  Vitals:    07/10/24 0816 07/10/24 0905 07/10/24 1108 07/10/24 1111   BP: 136/72      BP Location: Left arm      Patient Position: Sitting      Pulse: 94  84 86   Resp: 16  18 18   Temp: 98 °F (36.7 °C)      TempSrc: Oral      SpO2: 96% 95% 95% 98%   Weight:       Height:           Physical Exam:     General Appearance:    Awake and alert, in no acute distress   Head:    Normocephalic, without obvious abnormality   Eyes:          Conjunctivae normal, anicteric sclera   Throat:   No oral lesions, no thrush, oral mucosa moist   Neck:   No adenopathy, supple, no JVD   Lungs:     respirations regular, even and unlabored   Abdomen:     Soft, non-tender, no rebound or guarding, non-distended   Rectal:     Deferred   Extremities:   No edema, no cyanosis   Skin:   No bruising or rash, no jaundice        Results Review:    CBC    Results from last 7 days   Lab Units 07/10/24  0913 07/09/24  1551   WBC 10*3/mm3 13.28* 12.66*   HEMOGLOBIN g/dL 11.6* 11.9*   PLATELETS 10*3/mm3 186 176     CMP   Results from last 7 days   Lab Units 07/10/24  0913 07/09/24  1551   SODIUM mmol/L 139 139   POTASSIUM mmol/L 3.3* 3.8   CHLORIDE mmol/L 100 103   CO2 mmol/L 27.5 24.8   BUN mg/dL 16 19   CREATININE mg/dL  0.83 0.66   GLUCOSE mg/dL 192* 118*   ALBUMIN g/dL 4.4 4.3   BILIRUBIN mg/dL 0.5 0.4   ALK PHOS U/L 107 115   AST (SGOT) U/L 24 28   ALT (SGPT) U/L 22 25   MAGNESIUM mg/dL 1.9 1.6   PHOSPHORUS mg/dL 3.7 3.3     Cr Clearance Estimated Creatinine Clearance: 72.8 mL/min (by C-G formula based on SCr of 0.83 mg/dL).  Coag     HbA1C   Lab Results   Component Value Date    HGBA1C 6.25 (H) 07/09/2024    HGBA1C 6.20 (H) 06/12/2024     Results from last 7 days   Lab Units 07/09/24  1800 07/09/24  1551   HSTROP T ng/L 11 12     Infection     UA      Microbiology Results (last 10 days)       ** No results found for the last 240 hours. **          Imaging Results (Last 72 Hours)       Procedure Component Value Units Date/Time    XR Chest 1 View [[id number]] Collected: 07/09/24 1145     Updated: 07/09/24 1148    Narrative:      XR CHEST 1 VW    Date of Exam: 7/9/2024 11:41 AM EDT    Indication: respiratory status change post EGD    Comparison: 5/3/2024    Findings:  Cardiac silhouette is magnified. Pulmonary vasculature is mildly indistinct. No pleural effusion or pneumothorax is seen. No acute osseous lesion is seen.      Impression:      Impression:  1.Suspect mild pulmonary vascular congestion.      Electronically Signed: Efrem Lyles MD    7/9/2024 11:46 AM EDT    Workstation ID: [id number]            Assessment & Plan     ASSESSMENT:  -Dyspnea s/p EUS -resolved  -Abnormal imaging of the pancreas  -BRCA2 gene mutation positive/genetic predisposition for pancreatic cancer  -Hypertension  -Hyperlipidemia  -History of hysterectomy  -History of appendectomy  -History of cholecystectomy    PLAN:  Patient is a 63-year-old female with history of BRCA2 gene mutation and abnormal imaging of the pancreas who underwent EUS yesterday.  Following the procedure, she was dyspneic and was unable to wean off of O2.  Therefore, she was kept for observation overnight.    States that she is feeling much better today.  Denies any further dyspnea.   Now off O2.  LFTs normal.  Hemoglobin 11.6.  WBC count 13.28.  CXR shows mild pulmonary vascular congestion.  S/p EGD/EUS on 7/9 which showed a few small gastric polyps, gastritis, small sliding hiatal hernia, and a small hypoechoic area in the tail/body junction of the pancreas which was too small for biopsy.  Patient will need repeat MRI pancreatic mass protocol in 6 months for further monitoring.  Diet as tolerated.  Okay for discharge home from GI standpoint.  She should keep her follow-up appointment as scheduled on 8/12/2024 at 9:45 AM.  GI will be available as inpatient as needed.      Electronically signed by ELIE James, 07/10/24, 12:13 PM EDT.

## 2024-07-10 NOTE — PLAN OF CARE
Goal Outcome Evaluation:  Plan of Care Reviewed With: patient        Progress: no change  Outcome Evaluation: Patient slept throughout the night with no complaints.  to be back this AM.

## 2024-07-11 ENCOUNTER — READMISSION MANAGEMENT (OUTPATIENT)
Dept: CALL CENTER | Facility: HOSPITAL | Age: 64
End: 2024-07-11
Payer: COMMERCIAL

## 2024-07-11 VITALS
WEIGHT: 217.81 LBS | RESPIRATION RATE: 15 BRPM | HEIGHT: 61 IN | DIASTOLIC BLOOD PRESSURE: 79 MMHG | SYSTOLIC BLOOD PRESSURE: 157 MMHG | BODY MASS INDEX: 41.12 KG/M2 | TEMPERATURE: 99.6 F | HEART RATE: 94 BPM | OXYGEN SATURATION: 93 %

## 2024-07-11 PROCEDURE — 94664 DEMO&/EVAL PT USE INHALER: CPT

## 2024-07-11 PROCEDURE — 94761 N-INVAS EAR/PLS OXIMETRY MLT: CPT

## 2024-07-11 PROCEDURE — 94799 UNLISTED PULMONARY SVC/PX: CPT

## 2024-07-11 RX ORDER — ESCITALOPRAM OXALATE 10 MG/1
30 TABLET ORAL DAILY
Qty: 90 TABLET | Refills: 0 | Status: SHIPPED | OUTPATIENT
Start: 2024-07-12 | End: 2024-08-11

## 2024-07-11 RX ADMIN — IPRATROPIUM BROMIDE AND ALBUTEROL SULFATE 3 ML: .5; 3 SOLUTION RESPIRATORY (INHALATION) at 07:49

## 2024-07-11 RX ADMIN — BUDESONIDE 0.5 MG: 0.5 INHALANT RESPIRATORY (INHALATION) at 07:52

## 2024-07-11 RX ADMIN — LEVOTHYROXINE SODIUM 50 MCG: 0.05 TABLET ORAL at 06:04

## 2024-07-11 RX ADMIN — BUPROPION HYDROCHLORIDE 450 MG: 150 TABLET, EXTENDED RELEASE ORAL at 06:04

## 2024-07-11 RX ADMIN — BUMETANIDE 1 MG: 1 TABLET ORAL at 09:05

## 2024-07-11 RX ADMIN — ESCITALOPRAM OXALATE 30 MG: 10 TABLET ORAL at 09:05

## 2024-07-11 RX ADMIN — FERROUS SULFATE TAB EC 324 MG (65 MG FE EQUIVALENT) 324 MG: 324 (65 FE) TABLET DELAYED RESPONSE at 09:05

## 2024-07-11 RX ADMIN — IPRATROPIUM BROMIDE AND ALBUTEROL SULFATE 3 ML: .5; 3 SOLUTION RESPIRATORY (INHALATION) at 11:17

## 2024-07-11 NOTE — PROGRESS NOTES
Enter Query Response Below      Query Response: Acute hypoxic respiratory failure ruled in              If applicable, please update the problem list.   Patient: Sindy Martin        : 1960  Account: [id number]           Admit Date:         How to Respond to this query:       a. Click New Note     b. Answer query within the yellow box.                c. Update the Problem List, if applicable.      If you have any questions about this query contact me at: [email or web address]      Dr. Tristan,    Patient underwent esophagogastroduodenoscopy with biopsy and was noted on the H&P to have acute hypoxic respiratory failure in the post procedure setting.  SpO2 %, RR 12-30.  Treatment included O2 up to 10 L/min NRB mask, and 2-4 L/min nasal cannula.  Currently patient SpO2 100% on 2 L/min O2.  Discharge summary listed hypoxia as a diagnosis.    Please clarify the following:    Acute hypoxic respiratory failure ruled in  Acute hypoxic respiratory failure ruled out  Other- specify______  Unable to determine      By submitting this query, we are merely seeking further clarification of documentation to accurately reflect all conditions that you are monitoring, evaluating, treating or that extend the hospitalization or utilize additional resources of care. Please utilize your independent clinical judgment when addressing the question(s) above.     This query and your response, once completed, will be entered into the legal medical record.    Sincerely,  France Jett, BS, BSN, RN, CCDS   Clinical Documentation Integrity Program

## 2024-07-11 NOTE — DISCHARGE PLACEMENT REQUEST
"Sindy Martin (63 y.o. Female)       Date of Birth   1960    Social Security Number       Address   [address] [place] [address]    Home Phone   [phone]    MRN   [id number]       Voodoo   Lutheran    Marital Status                               Admission Date   7/9/24    Admission Type   Elective    Admitting Provider   Britt Hwang MD    Attending Provider   Juanis Tristan MD    Department, Room/Bed   [facility] 3C MEDICAL INPATIENT, 373/1       Discharge Date       Discharge Disposition   Home or Self Care    Discharge Destination                                 Attending Provider: Juanis Tristan MD    Allergies: Codeine, Ibuprofen    Isolation: None   Infection: None   Code Status: CPR    Ht: 154.9 cm (61\")   Wt: 98.8 kg (217 lb 13 oz)    Admission Cmt: None   Principal Problem: Hypoxia [R09.02]                   Active Insurance as of 7/9/2024       Primary Coverage       Payor Plan Insurance Group Employer/Plan Group    [employer]HEM BLUE CROSS ANTHEM BLUE CROSS BLUE SHIELD PPO [id number]       Payor Plan Address Payor Plan Phone Number Payor Plan Fax Number Effective Dates    [address] [phone]  1/1/2019 - None Entered    [place] [address]         Subscriber Name Subscriber Birth Date Member ID       CYNDI MARTIN 4/30/1963 [id number]                     Emergency Contacts        (Rel.) Home Phone Work Phone Mobile Phone    CYNDI MARTIN (Spouse) [phone] -- [phone]    MARTA WEATHERS (Friend) [phone] -- [phone]    Christiano Garcia (Relative) -- -- [phone]          "

## 2024-07-11 NOTE — CASE MANAGEMENT/SOCIAL WORK
Continued Stay Note  BINDU Gurrola     Patient Name: Sindy Martin  MRN: [id number]  Today's Date: 7/11/2024    Admit Date: 7/9/2024    Plan: Routine home. New home O2 HS w/ [employer].   Discharge Plan       Row Name 07/11/24 1418       Plan    Plan Routine home. New home O2 HS w/ [employer].    Patient/Family in Agreement with Plan yes    Plan Comments Pt completed overnight sleep oximetry test and requires 2L O2 with sleep. Discharge orders in place. CM added to [employer] basket and liacassandra Cheema notified. She confirmed discussing with pt at bedside.                Megan Naegele, RN     Office Phone: [phone]  Office Cell: [phone]

## 2024-07-11 NOTE — OUTREACH NOTE
Prep Survey      Flowsheet Row Responses   [facility] patient discharged from? [facility]   Is LACE score < 7 ? Yes   Eligibility [facility]   Date of Admission 07/09/24   Date of Discharge 07/11/24   Discharge diagnosis Hypoxia   Does the patient have one of the following disease processes/diagnoses(primary or secondary)? Other   Does the patient have Home health ordered? No   Is there a DME ordered? Yes   What DME was ordered? New home O2 HS w/ [place].   Prep survey completed? Yes            Edwina ADLER - Registered Nurse

## 2024-07-12 ENCOUNTER — TRANSITIONAL CARE MANAGEMENT TELEPHONE ENCOUNTER (OUTPATIENT)
Dept: CALL CENTER | Facility: HOSPITAL | Age: 64
End: 2024-07-12
Payer: COMMERCIAL

## 2024-07-12 NOTE — OUTREACH NOTE
Call Center TCM Note      Flowsheet Row Responses   [facility] patient discharged from? [facility]   Does the patient have one of the following disease processes/diagnoses(primary or secondary)? Other   TCM attempt successful? No   Unsuccessful attempts Attempt 2            Kim Mauricio RN    7/12/2024, 16:29 EDT

## 2024-07-12 NOTE — OUTREACH NOTE
Call Center TCM Note      Flowsheet Row Responses   [facility] patient discharged from? [facility]   Does the patient have one of the following disease processes/diagnoses(primary or secondary)? Other   TCM attempt successful? No   Unsuccessful attempts Attempt 1            Kim Mauricio RN    7/12/2024, 11:00 EDT

## 2024-07-12 NOTE — PAYOR COMM NOTE
"    AUTH CORRECTION REQUEST --  [id number]    Please note that the correct admit date for this patient is 24.  I see that the start date was inadvertently noted as the patient's  on the initial request.  Please see the admit date as shown below on original fax request in the demographics facesheet and the order section.    Please advise if additional clinical is needed to process this request.  Thank you!    Joann Thacker  Utilization Review Coordinator  [address] [place]  [address]  Ph: [phone]  Fx: [phone]                    Sindy Martin (63 y.o. Female)       Date of Birth   1960    Social Security Number       Address   [address] [place] [address] [address] [place] [address]    Home Phone   [phone]    MRN   [id number]       Evangelical   Religious    Marital Status                               Admission Date   24    Admission Type   Elective    Admitting Provider   Britt Hwang MD    Attending Provider   Juanis Tristan MD    Department, Room/Bed   [facility] 3C MEDICAL INPATIENT, 373/1       Discharge Date       Discharge Disposition       Discharge Destination                                 Attending Provider: Juanis Tristan MD    Allergies: Codeine, Ibuprofen    Isolation: None   Infection: None   Code Status: CPR    Ht: 154.9 cm (61\")   Wt: 98.8 kg (217 lb 13 oz)    Admission Cmt: None   Principal Problem: Hypoxia [R09.02]                   Active Insurance as of 2024       Primary Coverage       Payor Plan Insurance Group Employer/Plan Group    ANTH BLUE CROSS [facility] BLUE CROSS BLUE SHIELD [facility] [id number]       Payor Plan Address Payor Plan Phone Number Payor Plan Fax Number Effective Dates    [address] [phone]  2019 - None Entered    [facility] [address]         Subscriber Name Subscriber Birth Date Member ID       CYNDI MARTIN 1963 [id number]                     Emergency Contacts       Contact " Person (Rel.) Home Phone Work Phone Mobile Phone    CYNDI SPARKS (Spouse) [phone] -- [phone]    MARTA WEATHERS (Friend) [phone] -- [phone]    Christiano Garcia (Relative) -- -- [phone]                07/09/24 1320  Inpatient Admission  Once        Level of Care: Med/Surg  Diagnosis: Hypoxia [852551]  Isolate for COVID?: No  Certification: I Certify That Inpatient Hospital Services Are Medically Necessary For Greater Than 2 Midnights

## 2024-07-12 NOTE — CASE MANAGEMENT/SOCIAL WORK
Case Management Discharge Note      Final Note: Routine home.    Selected Continued Care - Discharged on 7/11/2024 Admission date: 7/9/2024 - Discharge disposition: Home or Self Care      Durable Medical Equipment Coordination complete.      Service Provider Selected Services Address Phone Fax Patient Preferred    [facility] [facility] Durable Medical Equipment [address] [address] [phone] --             Transportation Services  Private: Car    Final Discharge Disposition Code: 01 - home or self-care

## 2024-07-13 ENCOUNTER — TRANSITIONAL CARE MANAGEMENT TELEPHONE ENCOUNTER (OUTPATIENT)
Dept: CALL CENTER | Facility: HOSPITAL | Age: 64
End: 2024-07-13
Payer: COMMERCIAL

## 2024-07-13 NOTE — OUTREACH NOTE
Call Center TCM Note      Flowsheet Row Responses   [facility] facility patient discharged from? [facility]   Does the patient have one of the following disease processes/diagnoses(primary or secondary)? Other   TCM attempt successful? No   Unsuccessful attempts Attempt 3  [no answer]            Maritza Singh RN    7/13/2024, 09:51 EDT

## 2024-07-15 NOTE — PAYOR COMM NOTE
"DISCHARGE NOTICE --   [id number]       This patient discharged HOME ON 7/11/24.  Please advise if additional information is needed to finalize this request.    Thank you!      Joann Thacker  Utilization Review Coordinator  [facility]  [address] [place] [address]  [place]  [address]  Ph: [phone]  Fx: [phone]          Sindy Martin (63 y.o. Female)       Date of Birth   1960    Social Security Number       Address   [address] [place] [address]    Home Phone   [phone]    MRN   [id number]       Evangelical   Scientology    Marital Status                               Admission Date   7/9/24    Admission Type   Elective    Admitting Provider   Britt Hwang MD    Attending Provider       Department, Room/Bed   [facility] 3C MEDICAL INPATIENT, 373/1       Discharge Date   7/11/2024    Discharge Disposition   Home or Self Care    Discharge Destination   Home                              Attending Provider: (none)   Allergies: Codeine, Ibuprofen    Isolation: None   Infection: None   Code Status: Prior    Ht: 154.9 cm (61\")   Wt: 98.8 kg (217 lb 13 oz)    Admission Cmt: None   Principal Problem: Hypoxia [R09.02]                   Active Insurance as of 7/9/2024       Primary Coverage       Payor Plan Insurance Group Employer/Plan Group    [facility] BLUE [facility] PPO [id number]       Payor Plan Address Payor Plan Phone Number Payor Plan Fax Number Effective Dates    [address] [phone]  1/1/2019 - None Entered    [facility] [address]         Subscriber Name Subscriber Birth Date Member ID       CYNDI MARTIN K[place]ARINE 4/30/1963 [id number]                     Emergency Contacts        (Rel.) Home Phone Work Phone Mobile Phone    CYNDI MARTIN (Spouse) [phone] -- [phone]    MARTA WEATHERS (Friend) [phone] -- [phone]    Christiano Garcia (Relative) -- -- [phone]                 Discharge Summary        Kun, " Juanis TALBERT MD at 07/10/24 1445          .             [facility] Medicine Services  Discharge Summary    Date of Service: 2024  Patient Name: Sindy Martin  : 1960  MRN: [id number]    Date of Admission: 2024  Discharge Diagnosis: Acute dyspnea after EGD  Date of Discharge: 2024  Primary Care Physician: Rubens Cali MD      Presenting Problem:   Abnormal findings on imaging test [R93.89]  Hypoxia [R09.02]    Active and Resolved Hospital Problems:  Active Hospital Problems   No active problems to display.      Resolved Hospital Problems    Diagnosis POA    **Hypoxia [R09.02] Yes         Hospital Course     HPI:  A 63 y.o. old female patient with PMH of depression hypertension hypothyroidism and presents to the hospital for endoscopic procedure.  After the procedure patient has hypoxia and needed 2 L oxygen and cannot wean off.  Anesthesia team requested for admission.  Per the patient she has intermitted shortness of breath this year and she was thinking it is from the acid reflux.  Does not have asthma COPD heart condition underlying.  Chest x-ray with mild pulmonary vascular congestion.  All labs are pending.  Will get EKG and echo.  She will need outpatient pulmonologist appointment for sleep study.      Hospital Course:  Patient arrived  for elective EGD with GI, after the procedure she had hypoxia and required 2L O2 via NC.  Today patient is saturating well on room air and tolerating her diet, toileting and ambulating to the restroom reports she feels back to her baseline.  Last night pulse oximetry showed patient desaturated and required 2L O2 via NC.  She has been provided with home O2 orders on discharge and asked to follow-up with pulmonology and undergo sleep study.  Echocardiogram also performed and findings discussed with patient.  All questions and concerns addressed.        DISCHARGE Follow Up Recommendations for labs and diagnostics: Pulmonology, GI and  PCP        Day of Discharge     Vital Signs:  Temp:  [98 °F (36.7 °C)-99.3 °F (37.4 °C)] 98.3 °F (36.8 °C)  Heart Rate:  [] 91  Resp:  [16-22] 18  BP: (119-147)/(66-81) 143/72  Flow (L/min):  [2-4] 3    Physical Exam:  Physical Exam    Head: Normocephalic and atraumatic.      Nose: Nose normal.   Eyes:      Extraocular Movements: Extraocular movements intact.      Conjunctivae/sclera: Conjunctivae normal.      Pupils: Pupils are equal, round, and reactive to light.   Cardiovascular:      Rate and Rhythm: normal       Pulses: Normal pulses.      Heart sounds: Normal heart sounds.   Pulmonary:   Rhonchi bilaterally  Abdominal:      General: Abdomen is flat. Bowel sounds are normal.      Palpations: Abdomen is soft.   Musculoskeletal:         General: Normal range of motion.      Cervical back: Normal range of motion and neck supple.   Skin:     General: Skin is dry.   Neurological:      General: No focal deficit present.      Mental Status: alert.   Psychiatric:         Mood and Affect: Mood normal.       Pertinent  and/or Most Recent Results     LAB RESULTS:      Lab 07/10/24  0913 07/09/24  1551   WBC 13.28* 12.66*   HEMOGLOBIN 11.6* 11.9*   HEMATOCRIT 38.1 38.5   PLATELETS 186 176   NEUTROS ABS 9.88* 10.02*   IMMATURE GRANS (ABS) 0.04 0.04   LYMPHS ABS 2.38 1.80   MONOS ABS 0.90 0.72   EOS ABS 0.05 0.07   MCV 82.8 83.5         Lab 07/10/24  0913 07/09/24  1551   SODIUM 139 139   POTASSIUM 3.3* 3.8   CHLORIDE 100 103   CO2 27.5 24.8   ANION GAP 11.5 11.2   BUN 16 19   CREATININE 0.83 0.66   EGFR 78.3 97.5   GLUCOSE 192* 118*   CALCIUM 9.5 9.1   MAGNESIUM 1.9 1.6   PHOSPHORUS 3.7 3.3   HEMOGLOBIN A1C  --  6.25*         Lab 07/10/24  0913 07/09/24  1551   TOTAL PROTEIN 7.0 6.6   ALBUMIN 4.4 4.3   GLOBULIN 2.6 2.3   ALT (SGPT) 22 25   AST (SGOT) 24 28   BILIRUBIN 0.5 0.4   ALK PHOS 107 115         Lab 07/09/24  1800 07/09/24  1551   PROBNP  --  209.0   HSTROP T 11 12                 Brief Urine Lab Results  (Last  result in the past 365 days)        Color   Clarity   Blood   Leuk Est   Nitrite   Protein   CREAT   Urine HCG        06/12/24 1041 Yellow   Clear   Negative   Small (1+)   Negative   Negative                 Microbiology Results (last 10 days)       ** No results found for the last 240 hours. **            XR Chest 1 View    Result Date: 7/9/2024  Impression: Impression: 1.Suspect mild pulmonary vascular congestion. Electronically Signed: Efrem Lyles MD  7/9/2024 11:46 AM EDT  Workstation ID: [id number]    MRI Breast Bilateral Diagnostic W WO Contrast    Result Date: 6/24/2024  Impression: No mammographic signs of malignancy in either breast.  Recommend continued annual screening mammography and breast MRI.   ASSESSMENT: BI-RADS 2. Benign findings.      Electronically Signed By-Jocelyn Bettencourt MD On:6/24/2024 3:04 PM                   Labs Pending at Discharge:      Procedures Performed  Procedure(s):  Esophagogastroduodenoscopy with biopsy x 2 areas and ENDOSCOPIC ULTRASOUND  07/09 1034 Upper EUS      Consults:   Consults       No orders found for last 30 day(s).              Discharge Details        Discharge Medications        Continue These Medications        Instructions Start Date   ALOE VERA JUICE PO   2 oz, Oral, 2 Times Daily      aspirin 81 MG tablet   81 mg, Oral, Daily      atorvastatin 20 MG tablet  Commonly known as: LIPITOR   TAKE 1 TABLET DAILY      buPROPion  MG 24 hr tablet  Commonly known as: WELLBUTRIN XL   450 mg, Oral, Every Morning      escitalopram 20 MG tablet  Commonly known as: LEXAPRO   30 mg, Oral, Daily      exemestane 25 MG tablet  Commonly known as: AROMASIN   TAKE 1 TABLET DAILY      famotidine 40 MG tablet  Commonly known as: PEPCID   40 mg, Oral, Daily      ferrous gluconate 324 MG tablet  Commonly known as: FERGON   324 mg, Oral, Daily With Breakfast, Drink 4 ounces of orange juice right before taking this iron pill.      loratadine 10 MG tablet  Commonly known as:  CLARITIN   10 mg, Oral, Daily      Magnesium 250 MG tablet   500 mg, Oral, Daily      meloxicam 15 MG tablet  Commonly known as: MOBIC   15 mg, Oral, Daily      metFORMIN 500 MG tablet  Commonly known as: GLUCOPHAGE   TAKE 2 TABLETS TWICE A DAY      multivitamin with minerals tablet tablet   1 tablet, Oral, Daily      pantoprazole 40 MG EC tablet  Commonly known as: PROTONIX   40 mg, Oral, Daily      Synthroid 50 MCG tablet  Generic drug: levothyroxine   TAKE 1 TABLET DAILY               Allergies   Allergen Reactions    Codeine Nausea And Vomiting    Ibuprofen Anaphylaxis         Discharge Disposition: Home with family.  Home or Self Care    Diet:  Hospital:  Diet Order   Procedures    Diet: Regular/House; Fluid Consistency: Thin (IDDSI 0)         Discharge Activity:   Activity Instructions       Activity as Tolerated                CODE STATUS:  Code Status and Medical Interventions:   Ordered at: 07/09/24 1407     Code Status (Patient has no pulse and is not breathing):    CPR (Attempt to Resuscitate)     Medical Interventions (Patient has pulse or is breathing):    Full Support         Future Appointments   Date Time Provider Department Center   7/10/2024  2:50 PM [facility] ECHO 3/INPATIENT [facility]I [facility]   10/16/2024  1:40 PM LAB MD [facility] ONC LAB NA  LAG ONAL [facility]   10/16/2024  1:45 PM Munira Finney MD [facility] ONC NA [facility]   9/4/2025  9:30 AM Rubens Cali MD [facility] PC FLKNB [facility]       Additional Instructions for the Follow-ups that You Need to Schedule       Discharge Follow-up with Specified Provider: GI nurse practitioner; 2 Months   As directed      To: GI nurse practitioner   Follow Up: 2 Months        Notify Physician or Go To The ED For the Following Conditions   As directed      Call GI or go to ER for post-operative fever, abdominal pain, or GI bleeding.    Order Comments: Call GI or go to ER for post-operative fever, abdominal pain, or GI bleeding.                 Time spent on Discharge including  face to face service:  >30 minutes    Signature: Electronically signed by Juanis Tristan MD, 07/10/24, 14:45 EDT.  [facility] Hospitalist Team      Electronically signed by Juanis Tristan MD at 07/11/24 5456

## 2024-07-29 DIAGNOSIS — C50.911 MALIGNANT NEOPLASM OF RIGHT FEMALE BREAST, UNSPECIFIED ESTROGEN RECEPTOR STATUS, UNSPECIFIED SITE OF BREAST: ICD-10-CM

## 2024-07-29 RX ORDER — EXEMESTANE 25 MG/1
TABLET ORAL
Qty: 90 TABLET | Refills: 3 | Status: SHIPPED | OUTPATIENT
Start: 2024-07-29

## 2024-08-08 DIAGNOSIS — R09.02 HYPOXEMIA: ICD-10-CM

## 2024-08-08 DIAGNOSIS — G47.33 OBSTRUCTIVE SLEEP APNEA: Primary | ICD-10-CM

## 2024-08-12 ENCOUNTER — OFFICE (OUTPATIENT)
Dept: URBAN - METROPOLITAN AREA CLINIC 64 | Facility: CLINIC | Age: 64
End: 2024-08-12
Payer: COMMERCIAL

## 2024-08-12 VITALS
DIASTOLIC BLOOD PRESSURE: 82 MMHG | WEIGHT: 215 LBS | SYSTOLIC BLOOD PRESSURE: 135 MMHG | HEART RATE: 85 BPM | HEIGHT: 61 IN

## 2024-08-12 DIAGNOSIS — K21.9 GASTRO-ESOPHAGEAL REFLUX DISEASE WITHOUT ESOPHAGITIS: ICD-10-CM

## 2024-08-12 DIAGNOSIS — K86.9 DISEASE OF PANCREAS, UNSPECIFIED: ICD-10-CM

## 2024-08-12 DIAGNOSIS — Z15.01 GENETIC SUSCEPTIBILITY TO MALIGNANT NEOPLASM OF BREAST: ICD-10-CM

## 2024-08-12 PROCEDURE — 99213 OFFICE O/P EST LOW 20 MIN: CPT

## 2024-09-04 ENCOUNTER — HOSPITAL ENCOUNTER (OUTPATIENT)
Dept: SLEEP MEDICINE | Facility: HOSPITAL | Age: 64
End: 2024-09-04
Payer: COMMERCIAL

## 2024-09-04 DIAGNOSIS — G47.33 OBSTRUCTIVE SLEEP APNEA: ICD-10-CM

## 2024-09-04 DIAGNOSIS — R09.02 HYPOXEMIA: ICD-10-CM

## 2024-09-04 PROCEDURE — 95810 POLYSOM 6/> YRS 4/> PARAM: CPT

## 2024-09-05 VITALS — HEIGHT: 61 IN | BODY MASS INDEX: 41.12 KG/M2 | WEIGHT: 217.81 LBS

## 2024-09-05 DIAGNOSIS — G47.33 OSA (OBSTRUCTIVE SLEEP APNEA): Primary | ICD-10-CM

## 2024-09-23 RX ORDER — MELOXICAM 15 MG/1
15 TABLET ORAL DAILY
Qty: 90 TABLET | Refills: 0 | Status: SHIPPED | OUTPATIENT
Start: 2024-09-23

## 2024-09-23 RX ORDER — LEVOTHYROXINE SODIUM 50 MCG
TABLET ORAL
Qty: 90 TABLET | Refills: 1 | Status: SHIPPED | OUTPATIENT
Start: 2024-09-23

## 2024-10-16 ENCOUNTER — OFFICE VISIT (OUTPATIENT)
Dept: ONCOLOGY | Facility: CLINIC | Age: 64
End: 2024-10-16
Payer: COMMERCIAL

## 2024-10-16 ENCOUNTER — HOSPITAL ENCOUNTER (OUTPATIENT)
Dept: SLEEP MEDICINE | Facility: HOSPITAL | Age: 64
End: 2024-10-16
Payer: COMMERCIAL

## 2024-10-16 ENCOUNTER — LAB (OUTPATIENT)
Dept: LAB | Facility: HOSPITAL | Age: 64
End: 2024-10-16
Payer: COMMERCIAL

## 2024-10-16 VITALS
SYSTOLIC BLOOD PRESSURE: 146 MMHG | OXYGEN SATURATION: 94 % | DIASTOLIC BLOOD PRESSURE: 83 MMHG | HEART RATE: 87 BPM | BODY MASS INDEX: 40.22 KG/M2 | HEIGHT: 61 IN | TEMPERATURE: 98 F | WEIGHT: 213 LBS | RESPIRATION RATE: 20 BRPM

## 2024-10-16 VITALS — HEIGHT: 61 IN | WEIGHT: 212.96 LBS | BODY MASS INDEX: 40.21 KG/M2

## 2024-10-16 DIAGNOSIS — G47.33 OSA (OBSTRUCTIVE SLEEP APNEA): ICD-10-CM

## 2024-10-16 DIAGNOSIS — C50.911 MALIGNANT NEOPLASM OF RIGHT FEMALE BREAST, UNSPECIFIED ESTROGEN RECEPTOR STATUS, UNSPECIFIED SITE OF BREAST: Primary | ICD-10-CM

## 2024-10-16 LAB
ALBUMIN SERPL-MCNC: 4.5 G/DL (ref 3.5–5.2)
ALBUMIN/GLOB SERPL: 2.1 G/DL
ALP SERPL-CCNC: 122 U/L (ref 39–117)
ALT SERPL W P-5'-P-CCNC: 26 U/L (ref 1–33)
ANION GAP SERPL CALCULATED.3IONS-SCNC: 10.2 MMOL/L (ref 5–15)
AST SERPL-CCNC: 24 U/L (ref 1–32)
BASOPHILS # BLD AUTO: 0.01 10*3/MM3 (ref 0–0.2)
BASOPHILS NFR BLD AUTO: 0.1 % (ref 0–1.5)
BILIRUB SERPL-MCNC: 0.2 MG/DL (ref 0–1.2)
BUN SERPL-MCNC: 20 MG/DL (ref 8–23)
BUN/CREAT SERPL: 25 (ref 7–25)
CALCIUM SPEC-SCNC: 10.3 MG/DL (ref 8.6–10.5)
CHLORIDE SERPL-SCNC: 105 MMOL/L (ref 98–107)
CO2 SERPL-SCNC: 26.8 MMOL/L (ref 22–29)
CREAT SERPL-MCNC: 0.8 MG/DL (ref 0.57–1)
DEPRECATED RDW RBC AUTO: 49 FL (ref 37–54)
EGFRCR SERPLBLD CKD-EPI 2021: 81.4 ML/MIN/1.73
EOSINOPHIL # BLD AUTO: 0.13 10*3/MM3 (ref 0–0.4)
EOSINOPHIL NFR BLD AUTO: 1.7 % (ref 0.3–6.2)
ERYTHROCYTE [DISTWIDTH] IN BLOOD BY AUTOMATED COUNT: 15.8 % (ref 12.3–15.4)
GLOBULIN UR ELPH-MCNC: 2.1 GM/DL
GLUCOSE SERPL-MCNC: 125 MG/DL (ref 65–99)
HCT VFR BLD AUTO: 40.5 % (ref 34–46.6)
HGB BLD-MCNC: 12.7 G/DL (ref 12–15.9)
HOLD SPECIMEN: NORMAL
HOLD SPECIMEN: NORMAL
LYMPHOCYTES # BLD AUTO: 2.6 10*3/MM3 (ref 0.7–3.1)
LYMPHOCYTES NFR BLD AUTO: 34.5 % (ref 19.6–45.3)
MCH RBC QN AUTO: 26.7 PG (ref 26.6–33)
MCHC RBC AUTO-ENTMCNC: 31.4 G/DL (ref 31.5–35.7)
MCV RBC AUTO: 85.3 FL (ref 79–97)
MONOCYTES # BLD AUTO: 0.68 10*3/MM3 (ref 0.1–0.9)
MONOCYTES NFR BLD AUTO: 9 % (ref 5–12)
NEUTROPHILS NFR BLD AUTO: 4.11 10*3/MM3 (ref 1.7–7)
NEUTROPHILS NFR BLD AUTO: 54.7 % (ref 42.7–76)
PLATELET # BLD AUTO: 193 10*3/MM3 (ref 140–450)
PMV BLD AUTO: 10.7 FL (ref 6–12)
POTASSIUM SERPL-SCNC: 4.9 MMOL/L (ref 3.5–5.2)
PROT SERPL-MCNC: 6.6 G/DL (ref 6–8.5)
RBC # BLD AUTO: 4.75 10*6/MM3 (ref 3.77–5.28)
SODIUM SERPL-SCNC: 142 MMOL/L (ref 136–145)
WBC NRBC COR # BLD AUTO: 7.53 10*3/MM3 (ref 3.4–10.8)

## 2024-10-16 PROCEDURE — 36415 COLL VENOUS BLD VENIPUNCTURE: CPT

## 2024-10-16 PROCEDURE — 95811 POLYSOM 6/>YRS CPAP 4/> PARM: CPT

## 2024-10-16 PROCEDURE — 85025 COMPLETE CBC W/AUTO DIFF WBC: CPT

## 2024-10-16 PROCEDURE — 80053 COMPREHEN METABOLIC PANEL: CPT | Performed by: INTERNAL MEDICINE

## 2024-10-16 NOTE — PROGRESS NOTES
Hematology/Oncology Outpatient Follow Up    PATIENT NAME:Sindy Martin  :1960  MRN: [id number]  PRIMARY CARE PHYSICIAN: Rubens Cali MD  REFERRING PHYSICIAN: Rubens Cali MD    Chief Complaint   Patient presents with    Follow-up     Malignant neoplasm of right female breast, unspecified estrogen receptor status, unspecified site of breast            HISTORY OF PRESENT ILLNESS:     This is a 63-year-old female who was originally seen on 14 after she presented with early stage right breast cancer.  Please refer to the details of my notes for more information.   She had an abnormal mammogram in May 2014.    6/10/14 - She underwent ultrasound guided localization of the right breast mass with sentinel lymph node injection, right lumpectomy.  Pathology revealed one sentinel lymph node negative for malignancy.  Tumor was strongly positive for ER, RI and HER-2/hiro was negative.  Tumor measured 1 cm in greatest dimension.  It was a grade III disease.  All the margins were negative.  Pathologic stage is [id number].    14 - Patient’s tumor was sent for Oncotype DX assay.  This returned with a recurrent score and validation study, she has a 12% chance of distant recurrence despite five years of Tamoxifen over the next 10 years.  On the validation study her ER was positive, RI was positive and HER-2/hiro was negative.  14 - Patient had BRCA 1 and 2 mutational analysis which returned with BRCA 2 deleterious mutation involving [id number] on the BRCA 2 sequencing gene.    2014 - She had FSH and serum estradiol levels, which are noted to be in the postmenopausal range.    14 - Patient was initiated on adjuvant chemotherapy with Taxotere and Cytoxan along with Neulasta.     10/6/14 - Patient completed 4th cycle of chemotherapy with Cytoxan and Taxotere.   Patient was seen by Dr. Buck who performed excisional biopsy of a mole on the left forearm and this was benign from history.     14  - Patient completed whole right breast radiation under the direction of Dr. Mcgrath.  She received total dose of 4256 cGy.  Patient has been seen by Dr. Son who will perform her complete hysterectomy due to BRCA positivity after the first of the year.      12/23/14 - Patient had a bone density, which was essentially normal.    December 2014 - Patient was initiated on Arimidex 1 mg every day as well as Os-Quinton D.   April 2015 - Patient underwent total robotic hysterectomy with bilateral adnexectomy.  Pathology was benign.  I have requested for official report of the above procedure and path reports.  Patient has been seen by Dr. Son on 4/29/15.    5/19/15 - Port catheter was removed.   5/28/15 - Bilateral diagnostic mammogram which was essentially unremarkable except that patient has heterogenously dense breasts.  6/22/15 - Patient was seen by Dr. Guo.  Follow up in one year was recommended.    Fasting lipid profile done which showed total cholesterol of 234, HDL of 42, triglyceride 237, elevated, and LDH was 145, also elevated.  BUN 20, creatinine 0.87 and LFTs were essentially unremarkable.  WBC 6.7, hemoglobin 12.4 and platelet count 197,000.    12/5/15 - Fasting lipid profile showed total cholesterol 133, HDL 50 and LDL 67, triglyceride 81.  BUN 22, creatinine 0.7.  2/24/16 - Bilateral breast MRI which showed post-op scar change on the right breast, but no MR finding to suggest malignancy.  She has a 4 mm enhancing lesion in the lower right breast at the inframammary fold.  Patient’s breast exam and skin evaluation was essentially unremarkable at the site where the 4 mm enhancing lesion was noted.  Patient also had no specific complaints.  3/22/16 - Anemia work up with reticulocyte count (N), B12 (N) 521, ferritin (N) 43, folate 20, haptoglobin 184, , BUN 11, creatinine 11.8.  SPEP with immunofixation assay showed IgG kappa monoclonal protein with M spike of 0.2 gm/dL.  Erythropoietin level (N)  22 [range 2-18].    3/31/16 - Quantitative immunoglobulins for IgA, IgG and IgM were normal.  Serum free light chains were essentially unremarkable.    4/5/16 - 24-hour urine electrophoresis which did not show any evidence of monoclonal gammopathy.   6/10/16 - Bilateral mammogram was essentially normal.  Follow up in one year was recommended.    6/20/16 - Serum transferrin receptor assay returned with high level at 5.2 [range 1.9-4.9].    6/27/16 - Urinalysis showed trace blood.  8/1/16 - Patient was seen by Dr. Torres and had EGD and colonoscopy.  EGD showed hiatal hernia, moderate to severe gastroduodenitis, but no ulcers seen.  No abnormalities seen on her colonoscopy.   9/12/16 - Chemistry panel:  BUN 14, creatinine 0.8, total protein (L) 5.8, alkaline phosphatase 94 [range 32-91].  Quantitative immunoglobulins for IgA (N) 101, IgG slightly low at 530 [range 600-1500], IgM (N) 195.  SPEP with immunofixation assay was essentially negative for any significant monoclonal protein.   9/19/16 - Urinalysis was negative for hematuria.   10/12/16 - Bone scan showed degenerative changes in the knees, otherwise negative.  No signs of metastatic disease.    10/10 & 10/17/16 - Patient received IV Injectafer.    1/16/17 - SPEP with immunofixation assay which did not show any evidence of monoclonal protein.   2/9/17 - Bilateral breast MRI showed no evidence of malignancy, but she had a skin lesion noted on the left breast.  Patient has since then seen Dr. Buck.  She has a follow up appointment in six months.    6/26/17 - Patient had bilateral diagnostic mammogram with tomosynthesis.  This revealed new linear pleomorphic microcalcifications in the medial right breast.  The left breast was unchanged.  Stereotactic biopsy was recommended.    7/12/17 - Patient underwent stereotactic biopsy of the right breast calcifications.  Pathology showed fibrous mastopathy with coarse stromal microcalcifications suggestive of previous  procedure.  Negative for carcinoma in situ and invasive carcinoma.  The result was concurrent with the image findings.  Follow up in one year was recommended.    9/11/17 - Since her last visit patient had labs including SPEP with immunofixation assay which showed a free lambda light chain identified.  IgA was 83.  IgG was 460.  IgM was 158.  BUN 17.  Creatinine 0.6.  Alkaline phosphatase 101.  Serum calcium 9.2.  CMP was normal.    9/16/17 - DEXA scan was normal.    2/15/18 - Bilateral breast MRI with stable post-op changes seen at the right breast, but no mass was seen in either the right or left breast.  Bilateral breast mammogram is due June 2018.   3/12/18 - CBC:  WBC 7.9, hemoglobin 13, platelet count 183,000.  Chemistry panel:  BUN 19.  Creatinine 0.7.  Alkaline phosphatase 92.  IgA 511.  IgG low at 518.  IgM normal 191.  Kappa lambda free light chains were normal.  SPEP with LAZARO was normal.    7/30/18 - Bilateral diagnostic mammogram with 3D with no mammographic evidence of malignancy.    9/18/18 - CT scan of the abdomen and pelvis which showed post-op changes, but no masses were identified in the abdomen.    2/4/19 - SPEP with LAZARO which did not show any monoclonal protein.  Total cholesterol was 116, triglycerides 69, LDL 66, HDL 43.  Chemistry:  BUN 17, creatinine 0.9, liver function tests are normal.    2/22/2019-patient had bilateral breast MRI, there was no MR evidence of malignancy in either breast.  8/29/19-patient had a DEXA scan, this was normal bone mineral density  8/29/2019-patient had bilateral mammogram which showed heterogeneously dense breast tissue.  Scar tissue was noted on the right breast consistent with previous surgery.  Follow-up in 1 year was recommended  2/19/2020 patient had bilateral breast MRI which was normal, follow-up in 1 year was recommended  She is here today for routine follow-up visit.  Patient does not have any breast specific complaints today  March 2, 2021 patient had  bilateral breast MRI which did not show any MR evidence of malignancy  March 6, 2021 she had MRI of the abdomen which basically revealed mild fatty and atrophic pancreas no solid or cystic pancreatic mass was seen.  Patient to follow-up with Dr. Torers for EUS which would be due March 2022 February 2023: Anemia labs showed iron deficiency with a ferritin of 11.2.  She also had decreased iron saturation at 5% CMP was unremarkable except for slightly elevated blood sugar 115, LDH was normal at 204, folate was more than 20, haptoglobin was 237, reticulocyte count was normal at 1.35 B12 was 573 SPEP with LAZARO did not show any monoclonal protein. Urinalysis was negative for hematuria.  She was recommended to receive iron infusion and GI referral  4/20/2023: Patient was seen by Dr. Mayer who recommended EGD and colonoscopy    Past Medical History:   Diagnosis Date    Anesthesia complication     laughing gas caused vomiting    Depression     Hearing loss     Hypertension     Hypothyroidism     Metabolic syndrome        Past Surgical History:   Procedure Laterality Date    APPENDECTOMY      BREAST BIOPSY      BREAST LUMPECTOMY      CARPAL TUNNEL RELEASE      CHOLECYSTECTOMY      COLONOSCOPY  2018 (?)    HYSTERECTOMY      LYMPH NODE BIOPSY  2014    [place] node under left arm    OOPHORECTOMY      UPPER ENDOSCOPIC ULTRASOUND W/ FNA N/A 04/28/2022    Procedure: EUS WITH BIOPSY;  Surgeon: Roldan Mayer MD;  Location: [facility] ENDOSCOPY;  Service: Gastroenterology;  Laterality: N/A;  HIATEL HERNIA, GASTRIC POLYP    UPPER ENDOSCOPIC ULTRASOUND W/ FNA N/A 7/9/2024    Procedure: Esophagogastroduodenoscopy with biopsy x 2 areas and ENDOSCOPIC ULTRASOUND;  Surgeon: Roldan Mayer MD;  Location: [facility] ENDOSCOPY;  Service: Gastroenterology;  Laterality: N/A;  post op: gastritis, gastric polyp, small sliding hiatal hernia, pancreatic lesion         Current Outpatient Medications:     ALOE VERA JUICE PO, Take 2 oz by mouth 2 (Two)  Times a Day., Disp: , Rfl:     aspirin 81 MG tablet, Take 1 tablet by mouth Daily., Disp: , Rfl:     atorvastatin (LIPITOR) 20 MG tablet, TAKE 1 TABLET DAILY, Disp: 90 tablet, Rfl: 1    buPROPion XL (WELLBUTRIN XL) 150 MG 24 hr tablet, Take 3 tablets by mouth Every Morning., Disp: , Rfl:     escitalopram (LEXAPRO) 10 MG tablet, Take 3 tablets by mouth Daily for 30 days., Disp: 90 tablet, Rfl: 0    exemestane (AROMASIN) 25 MG tablet, TAKE 1 TABLET DAILY, Disp: 90 tablet, Rfl: 3    famotidine (PEPCID) 40 MG tablet, Take 1 tablet by mouth Daily., Disp: 90 tablet, Rfl: 3    loratadine (CLARITIN) 10 MG tablet, Take 1 tablet by mouth Daily., Disp: , Rfl:     Magnesium 250 MG tablet, Take 2 tablets by mouth Daily., Disp: , Rfl:     meloxicam (MOBIC) 15 MG tablet, TAKE 1 TABLET BY MOUTH DAILY, Disp: 90 tablet, Rfl: 0    metFORMIN (GLUCOPHAGE) 500 MG tablet, TAKE 2 TABLETS TWICE A DAY, Disp: 360 tablet, Rfl: 3    multivitamin with minerals tablet tablet, Take 1 tablet by mouth Daily., Disp: , Rfl:     pantoprazole (PROTONIX) 40 MG EC tablet, Take 1 tablet by mouth Daily., Disp: , Rfl:     Synthroid 50 MCG tablet, TAKE 1 TABLET DAILY, Disp: 90 tablet, Rfl: 1  No current facility-administered medications for this visit.    Facility-Administered Medications Ordered in Other Visits:     acetaminophen (TYLENOL) tablet 650 mg, 650 mg, Oral, Q4H PRN **OR** acetaminophen (TYLENOL) 160 MG/5ML oral solution 650 mg, 650 mg, Oral, Q4H PRN **OR** acetaminophen (TYLENOL) suppository 660 mg, 660 mg, Rectal, Q4H PRN, Jairo Cano MD    aluminum-magnesium hydroxide-simethicone (MAALOX MAX) 400-400-40 MG/5ML suspension 15 mL, 15 mL, Oral, Q6H PRN, Jairo Cano MD    [DISCONTINUED] sennosides-docusate (PERICOLACE) 8.6-50 MG per tablet 2 tablet, 2 tablet, Oral, BID PRN **AND** [DISCONTINUED] polyethylene glycol (MIRALAX) packet 17 g, 17 g, Oral, Daily PRN **AND** [DISCONTINUED] bisacodyl (DULCOLAX) EC tablet 5 mg, 5 mg, Oral, Daily PRN **AND**  bisacodyl (DULCOLAX) suppository 10 mg, 10 mg, Rectal, Daily PRN, Jario Cano MD    dextrose (D50W) (25 g/50 mL) IV injection 25 g, 25 g, Intravenous, Q15 Min PRN, Jairo Cano MD    dextrose (GLUTOSE) oral gel 15 g, 15 g, Oral, Q15 Min PRN, Jairo Cano MD    glucagon (GLUCAGEN) injection 1 mg, 1 mg, Intramuscular, Q15 Min PRN, Jairo Cano MD    insulin lispro (HUMALOG/ADMELOG) injection 2-7 Units, 2-7 Units, Subcutaneous, 4x Daily AC & at Bedtime, Jairo Cano MD    ipratropium-albuterol (DUO-NEB) nebulizer solution 3 mL, 3 mL, Nebulization, 4x Daily - RT, Jairo Cano MD    melatonin tablet 5 mg, 5 mg, Oral, Nightly PRN, Jairo Cano MD    Potassium Replacement - Follow Nurse / BPA Driven Protocol, , Does not apply, PRN, Jairo Cano MD    sodium chloride 0.9 % flush 10 mL, 10 mL, Intravenous, Q12H, Jairo Cano MD    sodium chloride 0.9 % flush 10 mL, 10 mL, Intravenous, PRN, Jairo Cano MD    sodium chloride 0.9 % infusion 40 mL, 40 mL, Intravenous, PRN, Jairo Cano MD    Allergies   Allergen Reactions    Codeine Nausea And Vomiting    Ibuprofen Anaphylaxis       Family History   Problem Relation Age of Onset    Arthritis Mother     Gout Mother     Parkinsonism Mother     Miscarriages / Stillbirths Mother         Miscarriage of second child    Lung cancer Father     Arthritis Father     Cancer Father     Depression Father     Mental illness Father     Lung cancer Maternal Aunt     Cancer Maternal Aunt     Uterine cancer Paternal Aunt 60    Cancer Paternal Aunt     Prostate cancer Paternal Uncle     Cancer Paternal Uncle     Melanoma Paternal Grandmother     Cancer Paternal Grandmother     Depression Paternal Grandmother     Liver disease Paternal Grandmother     Mental illness Paternal Grandmother     Prostate cancer Paternal Cousin     Melanoma Paternal Cousin     Esophageal cancer Paternal Cousin     Thyroid cancer Maternal Cousin 40    Esophageal cancer Maternal Cousin     Ovarian cancer Maternal Cousin 50    Heart  disease Maternal Grandfather     Diabetes Maternal Grandmother     Alcohol abuse Maternal Uncle     COPD Maternal Uncle     Heart disease Maternal Uncle     Heart disease Paternal Uncle     Liver disease Maternal Uncle        Cancer-related family history includes Cancer in her father, maternal aunt, paternal aunt, paternal grandmother, and paternal uncle; Esophageal cancer in her maternal cousin and paternal cousin; Lung cancer in her father and maternal aunt; Melanoma in her paternal cousin and paternal grandmother; Ovarian cancer (age of onset: 50) in her maternal cousin; Prostate cancer in her paternal cousin and paternal uncle; Thyroid cancer (age of onset: 40) in her maternal cousin; Uterine cancer (age of onset: 60) in her paternal aunt.    Social History     Tobacco Use    Smoking status: Never    Smokeless tobacco: Never   Vaping Use    Vaping status: Never Used   Substance Use Topics    Alcohol use: Not Currently     Comment: Very sporadically.  Not even once a month.  A glass of wine    Drug use: Never       I have reviewed and confirmed the accuracy of the patient's history: Chief complaint, HPI, ROS, and Subjective as entered by the MA/LPN/RN. Munirarachel Finney MD 10/16/24   10/16/24    SUBJECTIVE:    Complains of reflux symptoms mostly worse at night.  She also has occasional wheezing.      Patient was found to have obstructive sleep apnea, in the process of having a CPAP machine            REVIEW OF SYSTEMS:    Review of Systems   Constitutional:  Negative for chills and fever.   HENT:  Negative for ear pain, mouth sores, nosebleeds and sore throat.    Eyes:  Negative for photophobia and visual disturbance.   Respiratory:  Negative for wheezing and stridor.    Cardiovascular:  Negative for chest pain and palpitations.   Gastrointestinal:  Negative for abdominal pain, diarrhea, nausea and vomiting.   Endocrine: Negative for cold intolerance and heat intolerance.   Genitourinary:  Negative for  "dysuria and hematuria.   Musculoskeletal:  Negative for joint swelling and neck stiffness.   Skin:  Negative for color change and rash.   Neurological:  Negative for seizures and syncope.   Hematological:  Negative for adenopathy.        No obvious bleeding   Psychiatric/Behavioral:  Negative for agitation, confusion and hallucinations.      ROS as documented above    OBJECTIVE:    Vitals:    10/16/24 1430   BP: 146/83   Pulse: 87   Resp: 20   Temp: 98 °F (36.7 °C)   TempSrc: Infrared   SpO2: 94%   Weight: 96.6 kg (213 lb)   Height: 154.9 cm (61\")   PainSc: 0-No pain             ECOG    (1) Restricted in physically strenuous activity, ambulatory and able to do work of light nature    Physical Exam   Constitutional: She is oriented to person, place, and time. No distress.   HENT:   Head: Normocephalic and atraumatic.   Eyes: Conjunctivae are normal. Right eye exhibits no discharge. Left eye exhibits no discharge. No scleral icterus.   Neck: No thyromegaly present.   Cardiovascular: Normal rate, regular rhythm and normal heart sounds. Exam reveals no gallop and no friction rub.   Pulmonary/Chest: Effort normal. No stridor. No respiratory distress. She has no wheezes. Right breast exhibits no inverted nipple, no mass, no nipple discharge, no skin change and no tenderness. Left breast exhibits no inverted nipple, no mass, no nipple discharge, no skin change and no tenderness. No breast bleeding. Breasts are symmetrical.   Bilateral breast exam is negative  Bilateral axillary exam is negative   Abdominal: Soft. Bowel sounds are normal. She exhibits no mass. There is no abdominal tenderness. There is no rebound and no guarding.   Genitourinary: No breast bleeding.   Musculoskeletal: Normal range of motion. No tenderness.   Lymphadenopathy:     She has no cervical adenopathy.   Neurological: She is alert and oriented to person, place, and time. She exhibits normal muscle tone.   Skin: Skin is warm. No rash noted. She is " not diaphoretic. No erythema.   Psychiatric: Her behavior is normal.   Nursing note and vitals reviewed.    Physical exam as documented above    I have reexamined the patient and the results are consistent with the previously documented exam. Munira Finney MD      RECENT LABS    WBC   Date Value Ref Range Status   10/16/2024 7.53 3.40 - 10.80 10*3/mm3 Final     RBC   Date Value Ref Range Status   10/16/2024 4.75 3.77 - 5.28 10*6/mm3 Final     Hemoglobin   Date Value Ref Range Status   10/16/2024 12.7 12.0 - 15.9 g/dL Final     Hematocrit   Date Value Ref Range Status   10/16/2024 40.5 34.0 - 46.6 % Final     MCV   Date Value Ref Range Status   10/16/2024 85.3 79.0 - 97.0 fL Final     MCH   Date Value Ref Range Status   10/16/2024 26.7 26.6 - 33.0 pg Final     MCHC   Date Value Ref Range Status   10/16/2024 31.4 (L) 31.5 - 35.7 g/dL Final     RDW   Date Value Ref Range Status   10/16/2024 15.8 (H) 12.3 - 15.4 % Final     RDW-SD   Date Value Ref Range Status   10/16/2024 49.0 37.0 - 54.0 fl Final     MPV   Date Value Ref Range Status   10/16/2024 10.7 6.0 - 12.0 fL Final     Platelets   Date Value Ref Range Status   10/16/2024 193 140 - 450 10*3/mm3 Final     Neutrophil %   Date Value Ref Range Status   10/16/2024 54.7 42.7 - 76.0 % Final     Lymphocyte %   Date Value Ref Range Status   10/16/2024 34.5 19.6 - 45.3 % Final     Monocyte %   Date Value Ref Range Status   10/16/2024 9.0 5.0 - 12.0 % Final     Eosinophil %   Date Value Ref Range Status   10/16/2024 1.7 0.3 - 6.2 % Final     Basophil %   Date Value Ref Range Status   10/16/2024 0.1 0.0 - 1.5 % Final     Immature Grans %   Date Value Ref Range Status   07/10/2024 0.3 0.0 - 0.5 % Final     Neutrophils, Absolute   Date Value Ref Range Status   10/16/2024 4.11 1.70 - 7.00 10*3/mm3 Final     Lymphocytes, Absolute   Date Value Ref Range Status   10/16/2024 2.60 0.70 - 3.10 10*3/mm3 Final     Monocytes, Absolute   Date Value Ref Range Status   10/16/2024  0.68 0.10 - 0.90 10*3/mm3 Final     Eosinophils, Absolute   Date Value Ref Range Status   10/16/2024 0.13 0.00 - 0.40 10*3/mm3 Final     Basophils, Absolute   Date Value Ref Range Status   10/16/2024 0.01 0.00 - 0.20 10*3/mm3 Final     Immature Grans, Absolute   Date Value Ref Range Status   07/10/2024 0.04 0.00 - 0.05 10*3/mm3 Final     nRBC   Date Value Ref Range Status   07/10/2024 0.0 0.0 - 0.2 /100 WBC Final       Lab Results   Component Value Date    GLUCOSE 192 (H) 07/10/2024    BUN 16 07/10/2024    CREATININE 0.83 07/10/2024    EGFRIFNONA 76 02/14/2022    EGFRIFAFRI 85 11/10/2016    BCR 19.3 07/10/2024    K 3.3 (L) 07/10/2024    CO2 27.5 07/10/2024    CALCIUM 9.5 07/10/2024    PROTENTOTREF 6.2 02/16/2023    ALBUMIN 4.4 07/10/2024    LABIL2 1.4 02/16/2023    AST 24 07/10/2024    ALT 22 07/10/2024           ASSESSMENT:    T1b N0 M0 invasive ductal carcinoma, ER positive, LA positive, HER-2/hiro negative, grade III tumor.  Ongoing management  Status post right lumpectomy with sentinel lymph node biopsy.  Reviewed  Status post adjuvant chemotherapy with Cytoxan and Taxotere for four cycles.    BRCA 2 sequencing gene with 3036 deletion 4 deleterious mutation.  Ongoing management  Status post right breast radiation.   Recurrent anemia: Work-up revealed iron deficiency.  Referred to Dr. Mayer April 2023.  Plan for EGD colonoscopy coming up next week.  Patient will continue oral iron supplementation.  She has had a hemoglobin response.  Hemoglobin is 12 g per DL.  Postmenopausal hormonal status.   JOY with severe sleep hypoxia 10/16/24.  She will follow-up with her physician  Status post robotic complete hysterectomy and adnexectomy with benign findings.    Hypercholesterolemia on treatment.  Lipid panel 2/4/19 was normal.   Arimidex initiated December 2014, discontinued October 2016 due to side effects.  Aromasin initiated November 2016.  Remains on Aromasin.  Continue Aromasin till December 2024  Iron deficiency  anemia, resolved.   Monoclonal gammopathy of unknown significance.  These has been zoraida;l for past 2 years. Will discontinue checking.  Bilateral hip pain.  Resolved        PLANS:    Patient has discontinued oral iron supplementation       Continue Aromasin, Os-Quinton D till end of December 2024.  DC prescriptions after December    MRI  breast be due June 2025.  Reviewed her latest breast MRI      Follow-up with GI for reflux symptoms    Bilateral screening mammogram will be due  Due in December 2024.  This has been ordered    CMP today    CBC reviewed    Abdominal MRI completed 4/25/2023: Normal.  Will be done every 2 years.  Patient to go follow-up with GI for EUS of the pancreas due this year 2024. MRI to be done in Feb 2025 for pancreatic 3mm lesion pancreas Dr Mayer ordering.  Patient will follow-up with him    Last bone density was normal 11/2/2023.  Next bone density will be due 11/2/2025    Status post colonoscopy and EGD by Dr. Mayer June 2023    Follow-up with dermatologist once a year: Reviewed Dr. Buck.  She has had her annual evaluation              Continue monthly breast self exams and call for problems at any point in time.        She will follow up with Dr. Mayer for pancreatic cancer screening.  We discussed use of pancreatic MRI alternating with EUS.          Patient is five years out from her breast cancer diagnosis.  I did discuss prophylactic mastectomies.  At this time patient wants to continue with breast conservation.  I will continue Aromasin for her for breast cancer prevention.  She has not had any significant toxicities on this treatment.             I have reviewed labs results, imaging, vitals, and medications with the patient today.  Follow-up in 6 months with me or earlier as needed for problems         Patient verbalized understanding and is in agreement of the above plan.        I spent 40 total minutes, face-to-face, caring for Sindy today. 90% of this time involved counseling  and/or coordination of care as documented within this note.

## 2024-10-22 DIAGNOSIS — G47.33 OSA (OBSTRUCTIVE SLEEP APNEA): Primary | ICD-10-CM

## 2024-11-04 ENCOUNTER — HOSPITAL ENCOUNTER (OUTPATIENT)
Dept: MAMMOGRAPHY | Facility: HOSPITAL | Age: 64
Discharge: HOME | End: 2024-11-04
Admitting: INTERNAL MEDICINE
Payer: COMMERCIAL

## 2024-11-04 DIAGNOSIS — C50.911 MALIGNANT NEOPLASM OF RIGHT FEMALE BREAST, UNSPECIFIED ESTROGEN RECEPTOR STATUS, UNSPECIFIED SITE OF BREAST: ICD-10-CM

## 2024-11-04 PROCEDURE — 77063 BREAST TOMOSYNTHESIS BI: CPT

## 2024-11-04 PROCEDURE — 77067 SCR MAMMO BI INCL CAD: CPT

## 2024-11-04 RX ORDER — ATORVASTATIN CALCIUM 20 MG/1
TABLET, FILM COATED ORAL
Qty: 90 TABLET | Refills: 1 | Status: SHIPPED | OUTPATIENT
Start: 2024-11-04

## 2024-12-12 RX ORDER — MELOXICAM 15 MG/1
15 TABLET ORAL DAILY
Qty: 90 TABLET | Refills: 0 | Status: SHIPPED | OUTPATIENT
Start: 2024-12-12

## 2025-03-04 DIAGNOSIS — I10 PRIMARY HYPERTENSION: ICD-10-CM

## 2025-03-04 DIAGNOSIS — R73.03 PREDIABETES: ICD-10-CM

## 2025-03-04 DIAGNOSIS — M46.1 SACROILIITIS: Primary | ICD-10-CM

## 2025-03-04 DIAGNOSIS — E78.2 MIXED HYPERLIPIDEMIA: ICD-10-CM

## 2025-03-04 DIAGNOSIS — D50.8 IRON DEFICIENCY ANEMIA SECONDARY TO INADEQUATE DIETARY IRON INTAKE: ICD-10-CM

## 2025-03-04 DIAGNOSIS — E03.9 ACQUIRED HYPOTHYROIDISM: ICD-10-CM

## 2025-03-04 DIAGNOSIS — M15.3 OTHER SECONDARY OSTEOARTHRITIS OF MULTIPLE SITES: ICD-10-CM

## 2025-03-04 RX ORDER — PREDNISONE 20 MG/1
TABLET ORAL
Qty: 25 TABLET | Refills: 1 | Status: SHIPPED | OUTPATIENT
Start: 2025-03-04

## 2025-03-05 ENCOUNTER — LAB (OUTPATIENT)
Dept: LAB | Facility: HOSPITAL | Age: 65
End: 2025-03-05
Payer: COMMERCIAL

## 2025-03-05 LAB
25(OH)D3 SERPL-MCNC: 36 NG/ML (ref 30–100)
ALBUMIN SERPL-MCNC: 4.5 G/DL (ref 3.5–5.2)
ALBUMIN/GLOB SERPL: 1.8 G/DL
ALP SERPL-CCNC: 118 U/L (ref 39–117)
ALT SERPL W P-5'-P-CCNC: 33 U/L (ref 1–33)
ANION GAP SERPL CALCULATED.3IONS-SCNC: 14.7 MMOL/L (ref 5–15)
AST SERPL-CCNC: 27 U/L (ref 1–32)
BACTERIA UR QL AUTO: ABNORMAL /HPF
BASOPHILS # BLD AUTO: 0.01 10*3/MM3 (ref 0–0.2)
BASOPHILS NFR BLD AUTO: 0.1 % (ref 0–1.5)
BILIRUB SERPL-MCNC: 0.2 MG/DL (ref 0–1.2)
BILIRUB UR QL STRIP: NEGATIVE
BUN SERPL-MCNC: 21 MG/DL (ref 8–23)
BUN/CREAT SERPL: 31.3 (ref 7–25)
CALCIUM SPEC-SCNC: 10 MG/DL (ref 8.6–10.5)
CHLORIDE SERPL-SCNC: 102 MMOL/L (ref 98–107)
CHOLEST SERPL-MCNC: 165 MG/DL (ref 0–200)
CLARITY UR: ABNORMAL
CO2 SERPL-SCNC: 24.3 MMOL/L (ref 22–29)
COLOR UR: YELLOW
CREAT SERPL-MCNC: 0.67 MG/DL (ref 0.57–1)
CRP SERPL-MCNC: <0.3 MG/DL (ref 0–0.5)
DEPRECATED RDW RBC AUTO: 47.9 FL (ref 37–54)
EGFRCR SERPLBLD CKD-EPI 2021: 96.5 ML/MIN/1.73
EOSINOPHIL # BLD AUTO: 0 10*3/MM3 (ref 0–0.4)
EOSINOPHIL NFR BLD AUTO: 0 % (ref 0.3–6.2)
ERYTHROCYTE [DISTWIDTH] IN BLOOD BY AUTOMATED COUNT: 15.4 % (ref 12.3–15.4)
ERYTHROCYTE [SEDIMENTATION RATE] IN BLOOD: 17 MM/HR (ref 0–30)
GLOBULIN UR ELPH-MCNC: 2.5 GM/DL
GLUCOSE SERPL-MCNC: 138 MG/DL (ref 65–99)
GLUCOSE UR STRIP-MCNC: NEGATIVE MG/DL
HBA1C MFR BLD: 6.31 % (ref 4.8–5.6)
HCT VFR BLD AUTO: 40.6 % (ref 34–46.6)
HDLC SERPL-MCNC: 66 MG/DL (ref 40–60)
HGB BLD-MCNC: 12.4 G/DL (ref 12–15.9)
HGB UR QL STRIP.AUTO: NEGATIVE
HYALINE CASTS UR QL AUTO: ABNORMAL /LPF
IMM GRANULOCYTES # BLD AUTO: 0.07 10*3/MM3 (ref 0–0.05)
IMM GRANULOCYTES NFR BLD AUTO: 0.6 % (ref 0–0.5)
KETONES UR QL STRIP: ABNORMAL
LDLC SERPL CALC-MCNC: 86 MG/DL (ref 0–100)
LDLC/HDLC SERPL: 1.29 {RATIO}
LEUKOCYTE ESTERASE UR QL STRIP.AUTO: ABNORMAL
LYMPHOCYTES # BLD AUTO: 1.23 10*3/MM3 (ref 0.7–3.1)
LYMPHOCYTES NFR BLD AUTO: 10.6 % (ref 19.6–45.3)
MCH RBC QN AUTO: 25.9 PG (ref 26.6–33)
MCHC RBC AUTO-ENTMCNC: 30.5 G/DL (ref 31.5–35.7)
MCV RBC AUTO: 84.8 FL (ref 79–97)
MONOCYTES # BLD AUTO: 0.4 10*3/MM3 (ref 0.1–0.9)
MONOCYTES NFR BLD AUTO: 3.4 % (ref 5–12)
MUCOUS THREADS URNS QL MICRO: ABNORMAL /HPF
NEUTROPHILS NFR BLD AUTO: 85.3 % (ref 42.7–76)
NEUTROPHILS NFR BLD AUTO: 9.89 10*3/MM3 (ref 1.7–7)
NITRITE UR QL STRIP: NEGATIVE
NRBC BLD AUTO-RTO: 0 /100 WBC (ref 0–0.2)
PH UR STRIP.AUTO: 6 [PH] (ref 5–8)
PLATELET # BLD AUTO: 218 10*3/MM3 (ref 140–450)
PMV BLD AUTO: 11.7 FL (ref 6–12)
POTASSIUM SERPL-SCNC: 4.9 MMOL/L (ref 3.5–5.2)
PROT SERPL-MCNC: 7 G/DL (ref 6–8.5)
PROT UR QL STRIP: ABNORMAL
RBC # BLD AUTO: 4.79 10*6/MM3 (ref 3.77–5.28)
RBC # UR STRIP: ABNORMAL /HPF
REF LAB TEST METHOD: ABNORMAL
SODIUM SERPL-SCNC: 141 MMOL/L (ref 136–145)
SP GR UR STRIP: 1.03 (ref 1–1.03)
SQUAMOUS #/AREA URNS HPF: ABNORMAL /HPF
T4 FREE SERPL-MCNC: 1.09 NG/DL (ref 0.93–1.7)
TRIGL SERPL-MCNC: 68 MG/DL (ref 0–150)
TSH SERPL DL<=0.05 MIU/L-ACNC: 0.67 UIU/ML (ref 0.27–4.2)
UROBILINOGEN UR QL STRIP: ABNORMAL
VIT B12 BLD-MCNC: 478 PG/ML (ref 211–946)
VLDLC SERPL-MCNC: 13 MG/DL (ref 5–40)
WBC # UR STRIP: ABNORMAL /HPF
WBC NRBC COR # BLD AUTO: 11.6 10*3/MM3 (ref 3.4–10.8)

## 2025-03-05 PROCEDURE — 83036 HEMOGLOBIN GLYCOSYLATED A1C: CPT | Performed by: FAMILY MEDICINE

## 2025-03-05 PROCEDURE — 80061 LIPID PANEL: CPT | Performed by: FAMILY MEDICINE

## 2025-03-05 PROCEDURE — 84439 ASSAY OF FREE THYROXINE: CPT | Performed by: FAMILY MEDICINE

## 2025-03-05 PROCEDURE — 82306 VITAMIN D 25 HYDROXY: CPT | Performed by: FAMILY MEDICINE

## 2025-03-05 PROCEDURE — 81001 URINALYSIS AUTO W/SCOPE: CPT | Performed by: FAMILY MEDICINE

## 2025-03-05 PROCEDURE — 84155 ASSAY OF PROTEIN SERUM: CPT | Performed by: FAMILY MEDICINE

## 2025-03-05 PROCEDURE — 36415 COLL VENOUS BLD VENIPUNCTURE: CPT | Performed by: FAMILY MEDICINE

## 2025-03-05 PROCEDURE — 86140 C-REACTIVE PROTEIN: CPT | Performed by: FAMILY MEDICINE

## 2025-03-05 PROCEDURE — 80050 GENERAL HEALTH PANEL: CPT | Performed by: FAMILY MEDICINE

## 2025-03-05 PROCEDURE — 85652 RBC SED RATE AUTOMATED: CPT | Performed by: FAMILY MEDICINE

## 2025-03-05 PROCEDURE — 82607 VITAMIN B-12: CPT | Performed by: FAMILY MEDICINE

## 2025-03-05 PROCEDURE — 84165 PROTEIN E-PHORESIS SERUM: CPT | Performed by: FAMILY MEDICINE

## 2025-03-05 RX ORDER — GLIMEPIRIDE 1 MG/1
1 TABLET ORAL
Qty: 90 TABLET | Refills: 2 | Status: SHIPPED | OUTPATIENT
Start: 2025-03-05

## 2025-03-05 NOTE — PROGRESS NOTES
Tell Sindy that I want her to stay on the metformin she has been taking but we are going to add in a medicine called glimepiride 1 mg.  She is going to take 1 mg every morning.  I want her to send us some blood sugar readings in about 4 to 6 weeks.  Check some blood sugars first thing in the morning when she gets up and then right before her evening meal.  Those 2 readings are what I need to look at when she sends me some blood sugar readings in about 3 to 4 weeks.  She does not need to do these every day but send me at least 6-12 readings

## 2025-03-06 LAB
ALBUMIN SERPL ELPH-MCNC: 3.9 G/DL (ref 2.9–4.4)
ALBUMIN/GLOB SERPL: 1.3 {RATIO} (ref 0.7–1.7)
ALPHA1 GLOB SERPL ELPH-MCNC: 0.3 G/DL (ref 0–0.4)
ALPHA2 GLOB SERPL ELPH-MCNC: 0.8 G/DL (ref 0.4–1)
B-GLOBULIN SERPL ELPH-MCNC: 1.2 G/DL (ref 0.7–1.3)
GAMMA GLOB SERPL ELPH-MCNC: 0.7 G/DL (ref 0.4–1.8)
GLOBULIN SER CALC-MCNC: 2.9 G/DL (ref 2.2–3.9)
LABORATORY COMMENT REPORT: NORMAL
M PROTEIN SERPL ELPH-MCNC: NORMAL G/DL
PROT SERPL-MCNC: 6.8 G/DL (ref 6–8.5)

## 2025-03-11 ENCOUNTER — TELEPHONE (OUTPATIENT)
Dept: FAMILY MEDICINE CLINIC | Facility: CLINIC | Age: 65
End: 2025-03-11
Payer: COMMERCIAL

## 2025-03-11 NOTE — TELEPHONE ENCOUNTER
Recent labs show no signs of inflammation.  If pain in joints persist we may have to consider a statin as an underlying cause of the proximal joint pain

## 2025-03-12 RX ORDER — LEVOTHYROXINE SODIUM 50 MCG
50 TABLET ORAL DAILY
Qty: 90 TABLET | Refills: 1 | Status: SHIPPED | OUTPATIENT
Start: 2025-03-12

## 2025-03-15 RX ORDER — ACYCLOVIR 400 MG/1
1 TABLET ORAL
Qty: 3 EACH | Refills: 6 | Status: SHIPPED | OUTPATIENT
Start: 2025-03-15

## 2025-03-17 RX ORDER — MELOXICAM 15 MG/1
15 TABLET ORAL DAILY
Qty: 90 TABLET | Refills: 0 | Status: SHIPPED | OUTPATIENT
Start: 2025-03-17

## 2025-04-15 NOTE — PROGRESS NOTES
Hematology/Oncology Outpatient Follow Up    PATIENT NAME:Sindy Martin  :1960  MRN: [id number]  PRIMARY CARE PHYSICIAN: Rubens Cali MD  REFERRING PHYSICIAN: Rubens Cali MD    Chief Complaint   Patient presents with    Follow-up     Malignant neoplasm of right female breast, unspecified estrogen receptor status, unspecified site of breast            HISTORY OF PRESENT ILLNESS:     This is a 63-year-old female who was originally seen on 14 after she presented with early stage right breast cancer.  Please refer to the details of my notes for more information.   She had an abnormal mammogram in May 2014.    6/10/14 - She underwent ultrasound guided localization of the right breast mass with sentinel lymph node injection, right lumpectomy.  Pathology revealed one sentinel lymph node negative for malignancy.  Tumor was strongly positive for ER, GA and HER-2/hiro was negative.  Tumor measured 1 cm in greatest dimension.  It was a grade III disease.  All the margins were negative.  Pathologic stage is [id number].    14 - Patient’s tumor was sent for Oncotype DX assay.  This returned with a recurrent score and validation study, she has a 12% chance of distant recurrence despite five years of Tamoxifen over the next 10 years.  On the validation study her ER was positive, GA was positive and HER-2/hiro was negative.  14 - Patient had BRCA 1 and 2 mutational analysis which returned with BRCA 2 deleterious mutation involving [id number] on the BRCA 2 sequencing gene.    2014 - She had FSH and serum estradiol levels, which are noted to be in the postmenopausal range.    14 - Patient was initiated on adjuvant chemotherapy with Taxotere and Cytoxan along with Neulasta.     10/6/14 - Patient completed 4th cycle of chemotherapy with Cytoxan and Taxotere.   Patient was seen by Dr. Buck who performed excisional biopsy of a mole on the left forearm and this was benign from history.     14  - Patient completed whole right breast radiation under the direction of Dr. Mcgrath.  She received total dose of 4256 cGy.  Patient has been seen by Dr. Son who will perform her complete hysterectomy due to BRCA positivity after the first of the year.      12/23/14 - Patient had a bone density, which was essentially normal.    December 2014 - Patient was initiated on Arimidex 1 mg every day as well as Os-Quinton D.   April 2015 - Patient underwent total robotic hysterectomy with bilateral adnexectomy.  Pathology was benign.  I have requested for official report of the above procedure and path reports.  Patient has been seen by Dr. Son on 4/29/15.    5/19/15 - Port catheter was removed.   5/28/15 - Bilateral diagnostic mammogram which was essentially unremarkable except that patient has heterogenously dense breasts.  6/22/15 - Patient was seen by Dr. Guo.  Follow up in one year was recommended.    Fasting lipid profile done which showed total cholesterol of 234, HDL of 42, triglyceride 237, elevated, and LDH was 145, also elevated.  BUN 20, creatinine 0.87 and LFTs were essentially unremarkable.  WBC 6.7, hemoglobin 12.4 and platelet count 197,000.    12/5/15 - Fasting lipid profile showed total cholesterol 133, HDL 50 and LDL 67, triglyceride 81.  BUN 22, creatinine 0.7.  2/24/16 - Bilateral breast MRI which showed post-op scar change on the right breast, but no MR finding to suggest malignancy.  She has a 4 mm enhancing lesion in the lower right breast at the inframammary fold.  Patient’s breast exam and skin evaluation was essentially unremarkable at the site where the 4 mm enhancing lesion was noted.  Patient also had no specific complaints.  3/22/16 - Anemia work up with reticulocyte count (N), B12 (N) 521, ferritin (N) 43, folate 20, haptoglobin 184, , BUN 11, creatinine 11.8.  SPEP with immunofixation assay showed IgG kappa monoclonal protein with M spike of 0.2 gm/dL.  Erythropoietin level (N)  22 [range 2-18].    3/31/16 - Quantitative immunoglobulins for IgA, IgG and IgM were normal.  Serum free light chains were essentially unremarkable.    4/5/16 - 24-hour urine electrophoresis which did not show any evidence of monoclonal gammopathy.   6/10/16 - Bilateral mammogram was essentially normal.  Follow up in one year was recommended.    6/20/16 - Serum transferrin receptor assay returned with high level at 5.2 [range 1.9-4.9].    6/27/16 - Urinalysis showed trace blood.  8/1/16 - Patient was seen by Dr. Torres and had EGD and colonoscopy.  EGD showed hiatal hernia, moderate to severe gastroduodenitis, but no ulcers seen.  No abnormalities seen on her colonoscopy.   9/12/16 - Chemistry panel:  BUN 14, creatinine 0.8, total protein (L) 5.8, alkaline phosphatase 94 [range 32-91].  Quantitative immunoglobulins for IgA (N) 101, IgG slightly low at 530 [range 600-1500], IgM (N) 195.  SPEP with immunofixation assay was essentially negative for any significant monoclonal protein.   9/19/16 - Urinalysis was negative for hematuria.   10/12/16 - Bone scan showed degenerative changes in the knees, otherwise negative.  No signs of metastatic disease.    10/10 & 10/17/16 - Patient received IV Injectafer.    1/16/17 - SPEP with immunofixation assay which did not show any evidence of monoclonal protein.   2/9/17 - Bilateral breast MRI showed no evidence of malignancy, but she had a skin lesion noted on the left breast.  Patient has since then seen Dr. Buck.  She has a follow up appointment in six months.    6/26/17 - Patient had bilateral diagnostic mammogram with tomosynthesis.  This revealed new linear pleomorphic microcalcifications in the medial right breast.  The left breast was unchanged.  Stereotactic biopsy was recommended.    7/12/17 - Patient underwent stereotactic biopsy of the right breast calcifications.  Pathology showed fibrous mastopathy with coarse stromal microcalcifications suggestive of previous  procedure.  Negative for carcinoma in situ and invasive carcinoma.  The result was concurrent with the image findings.  Follow up in one year was recommended.    9/11/17 - Since her last visit patient had labs including SPEP with immunofixation assay which showed a free lambda light chain identified.  IgA was 83.  IgG was 460.  IgM was 158.  BUN 17.  Creatinine 0.6.  Alkaline phosphatase 101.  Serum calcium 9.2.  CMP was normal.    9/16/17 - DEXA scan was normal.    2/15/18 - Bilateral breast MRI with stable post-op changes seen at the right breast, but no mass was seen in either the right or left breast.  Bilateral breast mammogram is due June 2018.   3/12/18 - CBC:  WBC 7.9, hemoglobin 13, platelet count 183,000.  Chemistry panel:  BUN 19.  Creatinine 0.7.  Alkaline phosphatase 92.  IgA 511.  IgG low at 518.  IgM normal 191.  Kappa lambda free light chains were normal.  SPEP with LAZARO was normal.    7/30/18 - Bilateral diagnostic mammogram with 3D with no mammographic evidence of malignancy.    9/18/18 - CT scan of the abdomen and pelvis which showed post-op changes, but no masses were identified in the abdomen.    2/4/19 - SPEP with LAZARO which did not show any monoclonal protein.  Total cholesterol was 116, triglycerides 69, LDL 66, HDL 43.  Chemistry:  BUN 17, creatinine 0.9, liver function tests are normal.    2/22/2019-patient had bilateral breast MRI, there was no MR evidence of malignancy in either breast.  8/29/19-patient had a DEXA scan, this was normal bone mineral density  8/29/2019-patient had bilateral mammogram which showed heterogeneously dense breast tissue.  Scar tissue was noted on the right breast consistent with previous surgery.  Follow-up in 1 year was recommended  2/19/2020 patient had bilateral breast MRI which was normal, follow-up in 1 year was recommended  She is here today for routine follow-up visit.  Patient does not have any breast specific complaints today  March 2, 2021 patient had  bilateral breast MRI which did not show any MR evidence of malignancy  March 6, 2021 she had MRI of the abdomen which basically revealed mild fatty and atrophic pancreas no solid or cystic pancreatic mass was seen.  Patient to follow-up with Dr. Torres for EUS which would be due March 2022 February 2023: Anemia labs showed iron deficiency with a ferritin of 11.2.  She also had decreased iron saturation at 5% CMP was unremarkable except for slightly elevated blood sugar 115, LDH was normal at 204, folate was more than 20, haptoglobin was 237, reticulocyte count was normal at 1.35 B12 was 573 SPEP with LAZARO did not show any monoclonal protein. Urinalysis was negative for hematuria.  She was recommended to receive iron infusion and GI referral  4/20/2023: Patient was seen by Dr. Mayer who recommended EGD and colonoscopy    Past Medical History:   Diagnosis Date    Anesthesia complication     laughing gas caused vomiting    Depression     Hearing loss     Hypertension     Hypothyroidism     Metabolic syndrome        Past Surgical History:   Procedure Laterality Date    APPENDECTOMY      BREAST BIOPSY      BREAST LUMPECTOMY      CARPAL TUNNEL RELEASE      CHOLECYSTECTOMY      COLONOSCOPY  2018 (?)    HYSTERECTOMY      LYMPH NODE BIOPSY  2014    Reedy node under left arm    OOPHORECTOMY      UPPER ENDOSCOPIC ULTRASOUND W/ FNA N/A 04/28/2022    Procedure: EUS WITH BIOPSY;  Surgeon: Roldan Mayer MD;  Location: [facility] ENDOSCOPY;  Service: Gastroenterology;  Laterality: N/A;  HIATEL HERNIA, GASTRIC POLYP    UPPER ENDOSCOPIC ULTRASOUND W/ FNA N/A 7/9/2024    Procedure: Esophagogastroduodenoscopy with biopsy x 2 areas and ENDOSCOPIC ULTRASOUND;  Surgeon: Roldan Mayer MD;  Location: [facility] ENDOSCOPY;  Service: Gastroenterology;  Laterality: N/A;  post op: gastritis, gastric polyp, small sliding hiatal hernia, pancreatic lesion         Current Outpatient Medications:     glimepiride (Amaryl) 1 MG tablet, Take 1 tablet by  mouth Every Morning Before Breakfast., Disp: 90 tablet, Rfl: 2    ALOE VERA JUICE PO, Take 2 oz by mouth 2 (Two) Times a Day., Disp: , Rfl:     aspirin 81 MG tablet, Take 1 tablet by mouth Daily., Disp: , Rfl:     atorvastatin (LIPITOR) 20 MG tablet, TAKE 1 TABLET DAILY, Disp: 90 tablet, Rfl: 1    Blood Glucose Monitoring Suppl kit, Send 1 glucose monitor.  Send 100 test strips.  Send 100 lancets.  Patient to test fasting every morning and prior to evening meal.  Test 3 days a week, Disp: 1 each, Rfl: 1    buPROPion XL (WELLBUTRIN XL) 150 MG 24 hr tablet, Take 3 tablets by mouth Every Morning., Disp: , Rfl:     Continuous Glucose Sensor (Dexcom G7 Sensor) misc, Use 1 Device Every 10 (Ten) Days., Disp: 3 each, Rfl: 6    escitalopram (LEXAPRO) 10 MG tablet, Take 3 tablets by mouth Daily for 30 days., Disp: 90 tablet, Rfl: 0    famotidine (PEPCID) 40 MG tablet, Take 1 tablet by mouth Daily., Disp: 90 tablet, Rfl: 3    loratadine (CLARITIN) 10 MG tablet, Take 1 tablet by mouth Daily., Disp: , Rfl:     Magnesium 250 MG tablet, Take 2 tablets by mouth Daily., Disp: , Rfl:     meloxicam (MOBIC) 15 MG tablet, TAKE 1 TABLET BY MOUTH DAILY, Disp: 90 tablet, Rfl: 0    metFORMIN (GLUCOPHAGE) 500 MG tablet, TAKE 2 TABLETS TWICE A DAY, Disp: 360 tablet, Rfl: 3    multivitamin with minerals tablet tablet, Take 1 tablet by mouth Daily., Disp: , Rfl:     pantoprazole (PROTONIX) 40 MG EC tablet, Take 1 tablet by mouth Daily., Disp: , Rfl:     predniSONE (DELTASONE) 20 MG tablet, Take 3 po qd for 4d  then 2 qd for 4d then take 1 qd for 4d  then 1/2 qd for 2d, Disp: 25 tablet, Rfl: 1    Synthroid 50 MCG tablet, TAKE 1 TABLET DAILY, Disp: 90 tablet, Rfl: 1  No current facility-administered medications for this visit.    Facility-Administered Medications Ordered in Other Visits:     acetaminophen (TYLENOL) tablet 650 mg, 650 mg, Oral, Q4H PRN **OR** acetaminophen (TYLENOL) 160 MG/5ML oral solution 650 mg, 650 mg, Oral, Q4H PRN **OR**  acetaminophen (TYLENOL) suppository 660 mg, 660 mg, Rectal, Q4H PRN, Jairo Cano MD    aluminum-magnesium hydroxide-simethicone (MAALOX MAX) 400-400-40 MG/5ML suspension 15 mL, 15 mL, Oral, Q6H PRN, Jairo Cano MD    [DISCONTINUED] sennosides-docusate (PERICOLACE) 8.6-50 MG per tablet 2 tablet, 2 tablet, Oral, BID PRN **AND** [DISCONTINUED] polyethylene glycol (MIRALAX) packet 17 g, 17 g, Oral, Daily PRN **AND** [DISCONTINUED] bisacodyl (DULCOLAX) EC tablet 5 mg, 5 mg, Oral, Daily PRN **AND** bisacodyl (DULCOLAX) suppository 10 mg, 10 mg, Rectal, Daily PRN, Jairo Cano MD    dextrose (D50W) (25 g/50 mL) IV injection 25 g, 25 g, Intravenous, Q15 Min PRN, Jairo Cano MD    dextrose (GLUTOSE) oral gel 15 g, 15 g, Oral, Q15 Min PRN, Jairo Cano MD    glucagon (GLUCAGEN) injection 1 mg, 1 mg, Intramuscular, Q15 Min PRN, Jairo Cano MD    insulin lispro (HUMALOG/ADMELOG) injection 2-7 Units, 2-7 Units, Subcutaneous, 4x Daily AC & at Bedtime, Jairo Cano MD    ipratropium-albuterol (DUO-NEB) nebulizer solution 3 mL, 3 mL, Nebulization, 4x Daily - RT, Jairo Cano MD    melatonin tablet 5 mg, 5 mg, Oral, Nightly PRN, Jairo Cano MD    Potassium Replacement - Follow Nurse / BPA Driven Protocol, , Not Applicable, PRN, Jairo Cano MD    sodium chloride 0.9 % flush 10 mL, 10 mL, Intravenous, Q12H, Jairo Cano MD    sodium chloride 0.9 % flush 10 mL, 10 mL, Intravenous, PRN, Jairo Cano MD    sodium chloride 0.9 % infusion 40 mL, 40 mL, Intravenous, PRN, Jairo Cano MD    Allergies   Allergen Reactions    Codeine Nausea And Vomiting    Ibuprofen Anaphylaxis       Family History   Problem Relation Age of Onset    Arthritis Mother     Gout Mother     Parkinsonism Mother     Miscarriages / Stillbirths Mother         Miscarriage of second child    Lung cancer Father     Arthritis Father     Cancer Father     Depression Father     Mental illness Father     Lung cancer Maternal Aunt     Cancer Maternal Aunt     Uterine cancer Paternal  Aunt 60    Cancer Paternal Aunt     Prostate cancer Paternal Uncle     Cancer Paternal Uncle     Melanoma Paternal Grandmother     Cancer Paternal Grandmother     Depression Paternal Grandmother     Liver disease Paternal Grandmother     Mental illness Paternal Grandmother     Prostate cancer Paternal Cousin     Melanoma Paternal Cousin     Esophageal cancer Paternal Cousin     Thyroid cancer Maternal Cousin 40    Esophageal cancer Maternal Cousin     Ovarian cancer Maternal Cousin 50    Heart disease Maternal Grandfather     Diabetes Maternal Grandmother     Alcohol abuse Maternal Uncle     COPD Maternal Uncle     Heart disease Maternal Uncle     Heart disease Paternal Uncle     Liver disease Maternal Uncle        Cancer-related family history includes Cancer in her father, maternal aunt, paternal aunt, paternal grandmother, and paternal uncle; Esophageal cancer in her maternal cousin and paternal cousin; Lung cancer in her father and maternal aunt; Melanoma in her paternal cousin and paternal grandmother; Ovarian cancer (age of onset: 50) in her maternal cousin; Prostate cancer in her paternal cousin and paternal uncle; Thyroid cancer (age of onset: 40) in her maternal cousin; Uterine cancer (age of onset: 60) in her paternal aunt.    Social History     Tobacco Use    Smoking status: Never    Smokeless tobacco: Never   Vaping Use    Vaping status: Never Used   Substance Use Topics    Alcohol use: Not Currently     Comment: Very sporadically.  Not even once a month.  A glass of wine    Drug use: Never     I have reviewed and confirmed the accuracy of the patient's history: Chief complaint, HPI, ROS, and Subjective as entered by the MA/LPN/RN. Munira Finney MD 04/16/25 4/16/25      SUBJECTIVE:    Complains of reflux symptoms mostly worse at night.  She also has occasional wheezing.      Patient was found to have obstructive sleep apnea, in the process of having a CPAP machine    Patient is here today for  "follow-up and denies any new issues 4/16/25            REVIEW OF SYSTEMS:    Review of Systems   Constitutional:  Negative for chills and fever.   HENT:  Negative for ear pain, mouth sores, nosebleeds and sore throat.    Eyes:  Negative for photophobia and visual disturbance.   Respiratory:  Negative for wheezing and stridor.    Cardiovascular:  Negative for chest pain and palpitations.   Gastrointestinal:  Negative for abdominal pain, diarrhea, nausea and vomiting.   Endocrine: Negative for cold intolerance and heat intolerance.   Genitourinary:  Negative for dysuria and hematuria.   Musculoskeletal:  Negative for joint swelling and neck stiffness.   Skin:  Negative for color change and rash.   Neurological:  Negative for seizures and syncope.   Hematological:  Negative for adenopathy.        No obvious bleeding   Psychiatric/Behavioral:  Negative for agitation, confusion and hallucinations.      ROS as documented above    OBJECTIVE:    Vitals:    04/16/25 1318   BP: 138/85   Pulse: 82   Resp: 18   Temp: 96.4 °F (35.8 °C)   TempSrc: Temporal   SpO2: 95%   Weight: 97.1 kg (214 lb)   Height: 154.9 cm (61\")   PainSc: 0-No pain               ECOG    (1) Restricted in physically strenuous activity, ambulatory and able to do work of light nature    Physical Exam   Constitutional: She is oriented to person, place, and time. No distress.   HENT:   Head: Normocephalic and atraumatic.   Eyes: Conjunctivae are normal. Right eye exhibits no discharge. Left eye exhibits no discharge. No scleral icterus.   Neck: No thyromegaly present.   Cardiovascular: Normal rate, regular rhythm and normal heart sounds. Exam reveals no gallop and no friction rub.   Pulmonary/Chest: Effort normal. No stridor. No respiratory distress. She has no wheezes. Right breast exhibits no inverted nipple, no mass, no nipple discharge, no skin change and no tenderness. Left breast exhibits no inverted nipple, no mass, no nipple discharge, no skin change " and no tenderness. No breast bleeding. Breasts are symmetrical.   Bilateral breast exam is negative  Bilateral axillary exam is negative   Abdominal: Soft. Bowel sounds are normal. She exhibits no mass. There is no abdominal tenderness. There is no rebound and no guarding.   Genitourinary: No breast bleeding.   Musculoskeletal: Normal range of motion. No tenderness.   Lymphadenopathy:     She has no cervical adenopathy.   Neurological: She is alert and oriented to person, place, and time. She exhibits normal muscle tone.   Skin: Skin is warm. No rash noted. She is not diaphoretic. No erythema.   Psychiatric: Her behavior is normal.   Nursing note and vitals reviewed.    Physical exam as documented above    I have reexamined the patient and the results are consistent with the previously documented exam. Munira Glendy Finney MD        RECENT LABS    WBC   Date Value Ref Range Status   04/16/2025 8.42 3.40 - 10.80 10*3/mm3 Final     RBC   Date Value Ref Range Status   04/16/2025 4.46 3.77 - 5.28 10*6/mm3 Final     Hemoglobin   Date Value Ref Range Status   04/16/2025 12.3 12.0 - 15.9 g/dL Final     Hematocrit   Date Value Ref Range Status   04/16/2025 38.5 34.0 - 46.6 % Final     MCV   Date Value Ref Range Status   04/16/2025 86.3 79.0 - 97.0 fL Final     MCH   Date Value Ref Range Status   04/16/2025 27.6 26.6 - 33.0 pg Final     MCHC   Date Value Ref Range Status   04/16/2025 31.9 31.5 - 35.7 g/dL Final     RDW   Date Value Ref Range Status   04/16/2025 15.8 (H) 12.3 - 15.4 % Final     RDW-SD   Date Value Ref Range Status   04/16/2025 48.6 37.0 - 54.0 fl Final     MPV   Date Value Ref Range Status   04/16/2025 11.1 6.0 - 12.0 fL Final     Platelets   Date Value Ref Range Status   04/16/2025 177 140 - 450 10*3/mm3 Final     Neutrophil %   Date Value Ref Range Status   04/16/2025 49.4 42.7 - 76.0 % Final     Lymphocyte %   Date Value Ref Range Status   04/16/2025 37.8 19.6 - 45.3 % Final     Monocyte %   Date Value  Ref Range Status   04/16/2025 11.4 5.0 - 12.0 % Final     Eosinophil %   Date Value Ref Range Status   04/16/2025 1.2 0.3 - 6.2 % Final     Basophil %   Date Value Ref Range Status   04/16/2025 0.2 0.0 - 1.5 % Final     Immature Grans %   Date Value Ref Range Status   03/05/2025 0.6 (H) 0.0 - 0.5 % Final     Neutrophils, Absolute   Date Value Ref Range Status   04/16/2025 4.16 1.70 - 7.00 10*3/mm3 Final     Lymphocytes, Absolute   Date Value Ref Range Status   04/16/2025 3.18 (H) 0.70 - 3.10 10*3/mm3 Final     Monocytes, Absolute   Date Value Ref Range Status   04/16/2025 0.96 (H) 0.10 - 0.90 10*3/mm3 Final     Eosinophils, Absolute   Date Value Ref Range Status   04/16/2025 0.10 0.00 - 0.40 10*3/mm3 Final     Basophils, Absolute   Date Value Ref Range Status   04/16/2025 0.02 0.00 - 0.20 10*3/mm3 Final     Immature Grans, Absolute   Date Value Ref Range Status   03/05/2025 0.07 (H) 0.00 - 0.05 10*3/mm3 Final     nRBC   Date Value Ref Range Status   03/05/2025 0.0 0.0 - 0.2 /100 WBC Final       Lab Results   Component Value Date    GLUCOSE 138 (H) 03/05/2025    BUN 21 03/05/2025    CREATININE 0.67 03/05/2025    EGFRIFNONA 76 02/14/2022    EGFRIFAFRI 85 11/10/2016    BCR 31.3 (H) 03/05/2025    K 4.9 03/05/2025    CO2 24.3 03/05/2025    CALCIUM 10.0 03/05/2025    ALBUMIN 4.5 03/05/2025    ALBUMIN 3.9 03/05/2025    AST 27 03/05/2025    ALT 33 03/05/2025           ASSESSMENT:    T1b N0 M0 invasive ductal carcinoma, ER positive, IN positive, HER-2/hiro negative, grade III tumor.  Ongoing surveillance  Status post right lumpectomy with sentinel lymph node biopsy.  Reviewed  Status post adjuvant chemotherapy with Cytoxan and Taxotere for four cycles.    BRCA 2 sequencing gene with 3036 deletion 4 deleterious mutation.  Ongoing management  Status post right breast radiation.   Recurrent anemia: Work-up revealed iron deficiency.  Referred to Dr. Mayer April 2023.  Plan for EGD colonoscopy coming up next week.  Patient will  continue oral iron supplementation.  She has had a hemoglobin response.  Hemoglobin is 12 g per DL.  Postmenopausal hormonal status.   JOY with severe sleep hypoxia 10/16/24.  She will follow-up with her physician  Status post robotic complete hysterectomy and adnexectomy with benign findings.    Hypercholesterolemia on treatment.  Lipid panel 2/4/19 was normal.   Arimidex initiated December 2014, discontinued October 2016 due to side effects.  Aromasin has been discontinued  Iron deficiency anemia, resolved.   Monoclonal gammopathy of unknown significance.  These has been zoraida;l for past 2 years. Will discontinue checking.  Bilateral hip pain.  Resolved        PLANS:    Discontinue Aromasin therapy, schedule MRI of the breast and abdomnal MRI to eval pancreas due in May 2025. She has BRCA 2 mutation, fu in 6 months. Additional labs not needed today.     CBC reviewed       Aromasin discontinued    MRI  breast be due June 2025.  Reviewed her latest breast MRI.  This has been ordered      Follow-up with GI for reflux symptoms    Bilateral screening mammogram will be due  Due in November 2025    Labs reviewed    Abdominal MRI completed 4/25/2023: Normal.  Will be done every 2 years.  Patient to go follow-up with GI for EUS of the pancreas due this year 2024. MRI to be done in Feb 2025 for pancreatic 3mm lesion pancreas Dr Mayer ordering.  Patient will follow-up with him    MRI of the abdomen ordered    Last bone density was normal 11/2/2023.  Next bone density will be due 11/2/2025    Status post colonoscopy and EGD by Dr. Mayer June 2023    Follow-up with dermatologist once a year: Reviewed Dr. Buck.  She has had her annual evaluation              Continue  monthly breast self exams and call for problems at any point in time.                  Patient is five years out from her breast cancer diagnosis.  I did discuss prophylactic mastectomies.  At this time patient wants to continue with breast conservation.  I will  continue Aromasin for her for breast cancer prevention.  She has not had any significant toxicities on this treatment.             I have reviewed labs results, imaging, vitals, and medications with the patient today.  Follow-up in 6 months with me or earlier as needed for problems         Patient verbalized understanding and is in agreement of the above plan.        Electronically signed by Munira Finney MD, 04/16/25, 5:22 PM EDT.

## 2025-04-16 ENCOUNTER — OFFICE VISIT (OUTPATIENT)
Dept: ONCOLOGY | Facility: CLINIC | Age: 65
End: 2025-04-16
Payer: COMMERCIAL

## 2025-04-16 ENCOUNTER — LAB (OUTPATIENT)
Dept: LAB | Facility: HOSPITAL | Age: 65
End: 2025-04-16
Payer: COMMERCIAL

## 2025-04-16 VITALS
DIASTOLIC BLOOD PRESSURE: 85 MMHG | HEIGHT: 61 IN | BODY MASS INDEX: 40.4 KG/M2 | TEMPERATURE: 96.4 F | WEIGHT: 214 LBS | RESPIRATION RATE: 18 BRPM | HEART RATE: 82 BPM | SYSTOLIC BLOOD PRESSURE: 138 MMHG | OXYGEN SATURATION: 95 %

## 2025-04-16 DIAGNOSIS — Z15.09 BRCA2 GENE MUTATION POSITIVE IN FEMALE: ICD-10-CM

## 2025-04-16 DIAGNOSIS — C50.911 MALIGNANT NEOPLASM OF RIGHT FEMALE BREAST, UNSPECIFIED ESTROGEN RECEPTOR STATUS, UNSPECIFIED SITE OF BREAST: Primary | ICD-10-CM

## 2025-04-16 DIAGNOSIS — Z15.02 BRCA2 GENE MUTATION POSITIVE IN FEMALE: ICD-10-CM

## 2025-04-16 DIAGNOSIS — Z15.01 BRCA2 GENE MUTATION POSITIVE IN FEMALE: ICD-10-CM

## 2025-04-16 LAB
BASOPHILS # BLD AUTO: 0.02 10*3/MM3 (ref 0–0.2)
BASOPHILS NFR BLD AUTO: 0.2 % (ref 0–1.5)
DEPRECATED RDW RBC AUTO: 48.6 FL (ref 37–54)
EOSINOPHIL # BLD AUTO: 0.1 10*3/MM3 (ref 0–0.4)
EOSINOPHIL NFR BLD AUTO: 1.2 % (ref 0.3–6.2)
ERYTHROCYTE [DISTWIDTH] IN BLOOD BY AUTOMATED COUNT: 15.8 % (ref 12.3–15.4)
HCT VFR BLD AUTO: 38.5 % (ref 34–46.6)
HGB BLD-MCNC: 12.3 G/DL (ref 12–15.9)
HOLD SPECIMEN: NORMAL
HOLD SPECIMEN: NORMAL
LYMPHOCYTES # BLD AUTO: 3.18 10*3/MM3 (ref 0.7–3.1)
LYMPHOCYTES NFR BLD AUTO: 37.8 % (ref 19.6–45.3)
MCH RBC QN AUTO: 27.6 PG (ref 26.6–33)
MCHC RBC AUTO-ENTMCNC: 31.9 G/DL (ref 31.5–35.7)
MCV RBC AUTO: 86.3 FL (ref 79–97)
MONOCYTES # BLD AUTO: 0.96 10*3/MM3 (ref 0.1–0.9)
MONOCYTES NFR BLD AUTO: 11.4 % (ref 5–12)
NEUTROPHILS NFR BLD AUTO: 4.16 10*3/MM3 (ref 1.7–7)
NEUTROPHILS NFR BLD AUTO: 49.4 % (ref 42.7–76)
PLATELET # BLD AUTO: 177 10*3/MM3 (ref 140–450)
PMV BLD AUTO: 11.1 FL (ref 6–12)
RBC # BLD AUTO: 4.46 10*6/MM3 (ref 3.77–5.28)
WBC NRBC COR # BLD AUTO: 8.42 10*3/MM3 (ref 3.4–10.8)

## 2025-04-16 PROCEDURE — 36415 COLL VENOUS BLD VENIPUNCTURE: CPT

## 2025-04-16 PROCEDURE — 85025 COMPLETE CBC W/AUTO DIFF WBC: CPT

## 2025-04-24 RX ORDER — ATORVASTATIN CALCIUM 20 MG/1
20 TABLET, FILM COATED ORAL DAILY
Qty: 90 TABLET | Refills: 1 | Status: SHIPPED | OUTPATIENT
Start: 2025-04-24

## 2025-05-09 ENCOUNTER — HOSPITAL ENCOUNTER (OUTPATIENT)
Dept: MRI IMAGING | Facility: HOSPITAL | Age: 65
Discharge: HOME | End: 2025-05-09
Admitting: INTERNAL MEDICINE
Payer: COMMERCIAL

## 2025-05-09 DIAGNOSIS — Z15.01 BRCA2 GENE MUTATION POSITIVE IN FEMALE: ICD-10-CM

## 2025-05-09 DIAGNOSIS — Z15.09 BRCA2 GENE MUTATION POSITIVE IN FEMALE: ICD-10-CM

## 2025-05-09 DIAGNOSIS — Z15.02 BRCA2 GENE MUTATION POSITIVE IN FEMALE: ICD-10-CM

## 2025-05-09 PROCEDURE — 25010000002 GADOTERIDOL PER 1 ML: Performed by: INTERNAL MEDICINE

## 2025-05-09 PROCEDURE — A9579 GAD-BASE MR CONTRAST NOS,1ML: HCPCS | Performed by: INTERNAL MEDICINE

## 2025-05-09 PROCEDURE — 74183 MRI ABD W/O CNTR FLWD CNTR: CPT

## 2025-05-09 RX ADMIN — GADOTERIDOL 20 ML: 279.3 INJECTION, SOLUTION INTRAVENOUS at 13:58

## 2025-05-20 ENCOUNTER — RESULTS FOLLOW-UP (OUTPATIENT)
Dept: ONCOLOGY | Facility: CLINIC | Age: 65
End: 2025-05-20
Payer: COMMERCIAL

## 2025-05-20 NOTE — TELEPHONE ENCOUNTER
Attempted to call pt to go over her MRI results.  Voicemail left for pt to return my call.  I left my direct number for pt to call back.

## 2025-05-21 NOTE — TELEPHONE ENCOUNTER
Pt returned my call and pt v/u of message left.  She states that she will call Dr. Mayer's office and make an appointment.  MRI faxed to Dr. Mayer

## 2025-05-21 NOTE — TELEPHONE ENCOUNTER
Pt left voicemail returning my call.  I attempted to call her back and had to leave another voicemail.

## 2025-05-27 DIAGNOSIS — M70.62 TROCHANTERIC BURSITIS OF BOTH HIPS: Primary | ICD-10-CM

## 2025-05-27 DIAGNOSIS — M71.9 BURSITIS OF MULTIPLE SITES: ICD-10-CM

## 2025-05-27 DIAGNOSIS — E78.00 PURE HYPERCHOLESTEROLEMIA: ICD-10-CM

## 2025-05-27 DIAGNOSIS — R73.03 PREDIABETES: ICD-10-CM

## 2025-05-27 DIAGNOSIS — E78.2 MIXED HYPERLIPIDEMIA: ICD-10-CM

## 2025-05-27 DIAGNOSIS — E66.01 MORBID (SEVERE) OBESITY DUE TO EXCESS CALORIES: Primary | ICD-10-CM

## 2025-05-27 DIAGNOSIS — M46.1 SACROILIITIS: ICD-10-CM

## 2025-05-27 DIAGNOSIS — D50.8 IRON DEFICIENCY ANEMIA SECONDARY TO INADEQUATE DIETARY IRON INTAKE: ICD-10-CM

## 2025-05-27 DIAGNOSIS — M70.61 TROCHANTERIC BURSITIS OF BOTH HIPS: Primary | ICD-10-CM

## 2025-05-27 DIAGNOSIS — K76.0 HEPATIC STEATOSIS: ICD-10-CM

## 2025-05-27 DIAGNOSIS — E03.9 ACQUIRED HYPOTHYROIDISM: ICD-10-CM

## 2025-05-27 DIAGNOSIS — I10 PRIMARY HYPERTENSION: ICD-10-CM

## 2025-05-28 ENCOUNTER — LAB (OUTPATIENT)
Dept: LAB | Facility: HOSPITAL | Age: 65
End: 2025-05-28
Payer: COMMERCIAL

## 2025-05-28 ENCOUNTER — HOSPITAL ENCOUNTER (OUTPATIENT)
Dept: MRI IMAGING | Facility: HOSPITAL | Age: 65
Discharge: HOME | End: 2025-05-28
Payer: COMMERCIAL

## 2025-05-28 DIAGNOSIS — K76.0 HEPATIC STEATOSIS: ICD-10-CM

## 2025-05-28 DIAGNOSIS — C50.911 MALIGNANT NEOPLASM OF RIGHT FEMALE BREAST, UNSPECIFIED ESTROGEN RECEPTOR STATUS, UNSPECIFIED SITE OF BREAST: ICD-10-CM

## 2025-05-28 DIAGNOSIS — R74.8 ELEVATED ALKALINE PHOSPHATASE LEVEL: ICD-10-CM

## 2025-05-28 DIAGNOSIS — E66.01 MORBID (SEVERE) OBESITY DUE TO EXCESS CALORIES: ICD-10-CM

## 2025-05-28 DIAGNOSIS — E03.9 ACQUIRED HYPOTHYROIDISM: ICD-10-CM

## 2025-05-28 DIAGNOSIS — D50.8 IRON DEFICIENCY ANEMIA SECONDARY TO INADEQUATE DIETARY IRON INTAKE: ICD-10-CM

## 2025-05-28 DIAGNOSIS — M71.9 BURSITIS OF MULTIPLE SITES: ICD-10-CM

## 2025-05-28 DIAGNOSIS — Z15.02 BRCA2 GENE MUTATION POSITIVE IN FEMALE: ICD-10-CM

## 2025-05-28 DIAGNOSIS — R73.03 PREDIABETES: ICD-10-CM

## 2025-05-28 DIAGNOSIS — E78.00 PURE HYPERCHOLESTEROLEMIA: ICD-10-CM

## 2025-05-28 DIAGNOSIS — M46.1 SACROILIITIS: ICD-10-CM

## 2025-05-28 DIAGNOSIS — I10 PRIMARY HYPERTENSION: ICD-10-CM

## 2025-05-28 DIAGNOSIS — Z15.01 BRCA2 GENE MUTATION POSITIVE IN FEMALE: ICD-10-CM

## 2025-05-28 DIAGNOSIS — Z15.09 BRCA2 GENE MUTATION POSITIVE IN FEMALE: ICD-10-CM

## 2025-05-28 DIAGNOSIS — E78.2 MIXED HYPERLIPIDEMIA: ICD-10-CM

## 2025-05-28 LAB
ALBUMIN SERPL-MCNC: 4.2 G/DL (ref 3.5–5.2)
ALBUMIN/GLOB SERPL: 1.8 G/DL
ALP SERPL-CCNC: 119 U/L (ref 39–117)
ALT SERPL W P-5'-P-CCNC: 24 U/L (ref 1–33)
AMYLASE SERPL-CCNC: 81 U/L (ref 28–100)
ANION GAP SERPL CALCULATED.3IONS-SCNC: 10.9 MMOL/L (ref 5–15)
AST SERPL-CCNC: 23 U/L (ref 1–32)
BASOPHILS # BLD AUTO: 0.02 10*3/MM3 (ref 0–0.2)
BASOPHILS NFR BLD AUTO: 0.3 % (ref 0–1.5)
BILIRUB SERPL-MCNC: 0.2 MG/DL (ref 0–1.2)
BUN SERPL-MCNC: 18 MG/DL (ref 8–23)
BUN/CREAT SERPL: 20.7 (ref 7–25)
CALCIUM SPEC-SCNC: 9.4 MG/DL (ref 8.6–10.5)
CHLORIDE SERPL-SCNC: 107 MMOL/L (ref 98–107)
CHOLEST SERPL-MCNC: 132 MG/DL (ref 0–200)
CO2 SERPL-SCNC: 25.1 MMOL/L (ref 22–29)
CREAT BLDA-MCNC: 0.8 MG/DL (ref 0.6–1.3)
CREAT SERPL-MCNC: 0.87 MG/DL (ref 0.57–1)
CRP SERPL-MCNC: 0.45 MG/DL (ref 0–0.5)
DEPRECATED RDW RBC AUTO: 48.2 FL (ref 37–54)
EGFRCR SERPLBLD CKD-EPI 2021: 73.6 ML/MIN/1.73
EGFRCR SERPLBLD CKD-EPI 2021: 81.4 ML/MIN/1.73
EOSINOPHIL # BLD AUTO: 0.13 10*3/MM3 (ref 0–0.4)
EOSINOPHIL NFR BLD AUTO: 1.9 % (ref 0.3–6.2)
ERYTHROCYTE [DISTWIDTH] IN BLOOD BY AUTOMATED COUNT: 15.4 % (ref 12.3–15.4)
ERYTHROCYTE [SEDIMENTATION RATE] IN BLOOD: 14 MM/HR (ref 0–30)
GLOBULIN UR ELPH-MCNC: 2.4 GM/DL
GLUCOSE SERPL-MCNC: 114 MG/DL (ref 65–99)
HBA1C MFR BLD: 6.02 % (ref 4.8–5.6)
HCT VFR BLD AUTO: 38 % (ref 34–46.6)
HDLC SERPL-MCNC: 46 MG/DL (ref 40–60)
HGB BLD-MCNC: 11.5 G/DL (ref 12–15.9)
IMM GRANULOCYTES # BLD AUTO: 0.03 10*3/MM3 (ref 0–0.05)
IMM GRANULOCYTES NFR BLD AUTO: 0.4 % (ref 0–0.5)
LDLC SERPL CALC-MCNC: 69 MG/DL (ref 0–100)
LDLC/HDLC SERPL: 1.48 {RATIO}
LIPASE SERPL-CCNC: 38 U/L (ref 13–60)
LYMPHOCYTES # BLD AUTO: 2.02 10*3/MM3 (ref 0.7–3.1)
LYMPHOCYTES NFR BLD AUTO: 29.3 % (ref 19.6–45.3)
MCH RBC QN AUTO: 26.1 PG (ref 26.6–33)
MCHC RBC AUTO-ENTMCNC: 30.3 G/DL (ref 31.5–35.7)
MCV RBC AUTO: 86.2 FL (ref 79–97)
MONOCYTES # BLD AUTO: 0.75 10*3/MM3 (ref 0.1–0.9)
MONOCYTES NFR BLD AUTO: 10.9 % (ref 5–12)
NEUTROPHILS NFR BLD AUTO: 3.94 10*3/MM3 (ref 1.7–7)
NEUTROPHILS NFR BLD AUTO: 57.2 % (ref 42.7–76)
NRBC BLD AUTO-RTO: 0 /100 WBC (ref 0–0.2)
PLATELET # BLD AUTO: 190 10*3/MM3 (ref 140–450)
PMV BLD AUTO: 11.7 FL (ref 6–12)
POTASSIUM SERPL-SCNC: 4.6 MMOL/L (ref 3.5–5.2)
PROT SERPL-MCNC: 6.6 G/DL (ref 6–8.5)
RBC # BLD AUTO: 4.41 10*6/MM3 (ref 3.77–5.28)
SODIUM SERPL-SCNC: 143 MMOL/L (ref 136–145)
TRIGL SERPL-MCNC: 90 MG/DL (ref 0–150)
TSH SERPL DL<=0.05 MIU/L-ACNC: 2.41 UIU/ML (ref 0.27–4.2)
URATE SERPL-MCNC: 4.6 MG/DL (ref 2.4–5.7)
VLDLC SERPL-MCNC: 17 MG/DL (ref 5–40)
WBC NRBC COR # BLD AUTO: 6.89 10*3/MM3 (ref 3.4–10.8)

## 2025-05-28 PROCEDURE — 83690 ASSAY OF LIPASE: CPT

## 2025-05-28 PROCEDURE — 36415 COLL VENOUS BLD VENIPUNCTURE: CPT

## 2025-05-28 PROCEDURE — 86038 ANTINUCLEAR ANTIBODIES: CPT

## 2025-05-28 PROCEDURE — 85652 RBC SED RATE AUTOMATED: CPT

## 2025-05-28 PROCEDURE — 82977 ASSAY OF GGT: CPT

## 2025-05-28 PROCEDURE — 84550 ASSAY OF BLOOD/URIC ACID: CPT

## 2025-05-28 PROCEDURE — 80061 LIPID PANEL: CPT

## 2025-05-28 PROCEDURE — 25510000001 GADOPICLENOL 0.5 MMOL/ML SOLUTION: Performed by: INTERNAL MEDICINE

## 2025-05-28 PROCEDURE — 82150 ASSAY OF AMYLASE: CPT

## 2025-05-28 PROCEDURE — A9573 GADOPICLENOL 0.5 MMOL/ML SOLUTION: HCPCS | Performed by: INTERNAL MEDICINE

## 2025-05-28 PROCEDURE — 77049 MRI BREAST C-+ W/CAD BI: CPT

## 2025-05-28 PROCEDURE — 84155 ASSAY OF PROTEIN SERUM: CPT

## 2025-05-28 PROCEDURE — 80050 GENERAL HEALTH PANEL: CPT

## 2025-05-28 PROCEDURE — 82565 ASSAY OF CREATININE: CPT

## 2025-05-28 PROCEDURE — 84165 PROTEIN E-PHORESIS SERUM: CPT

## 2025-05-28 PROCEDURE — 86140 C-REACTIVE PROTEIN: CPT

## 2025-05-28 PROCEDURE — 83036 HEMOGLOBIN GLYCOSYLATED A1C: CPT

## 2025-05-28 RX ADMIN — GADOPICLENOL 9 ML: 485.1 INJECTION INTRAVENOUS at 14:48

## 2025-05-29 ENCOUNTER — RESULTS FOLLOW-UP (OUTPATIENT)
Dept: LAB | Facility: HOSPITAL | Age: 65
End: 2025-05-29
Payer: COMMERCIAL

## 2025-05-29 DIAGNOSIS — R74.8 ELEVATED ALKALINE PHOSPHATASE LEVEL: Primary | ICD-10-CM

## 2025-05-29 LAB
ALBUMIN SERPL ELPH-MCNC: 3.7 G/DL (ref 2.9–4.4)
ALBUMIN/GLOB SERPL: 1.4 {RATIO} (ref 0.7–1.7)
ALPHA1 GLOB SERPL ELPH-MCNC: 0.2 G/DL (ref 0–0.4)
ALPHA2 GLOB SERPL ELPH-MCNC: 0.7 G/DL (ref 0.4–1)
B-GLOBULIN SERPL ELPH-MCNC: 1.1 G/DL (ref 0.7–1.3)
GAMMA GLOB SERPL ELPH-MCNC: 0.6 G/DL (ref 0.4–1.8)
GGT SERPL-CCNC: 23 U/L (ref 5–36)
GLOBULIN SER CALC-MCNC: 2.7 G/DL (ref 2.2–3.9)
LABORATORY COMMENT REPORT: NORMAL
M PROTEIN SERPL ELPH-MCNC: NORMAL G/DL
PROT SERPL-MCNC: 6.4 G/DL (ref 6–8.5)

## 2025-05-30 DIAGNOSIS — M46.1 SACROILIITIS: ICD-10-CM

## 2025-05-30 DIAGNOSIS — M70.61 TROCHANTERIC BURSITIS OF BOTH HIPS: Primary | ICD-10-CM

## 2025-05-30 DIAGNOSIS — M70.62 TROCHANTERIC BURSITIS OF BOTH HIPS: Primary | ICD-10-CM

## 2025-05-30 DIAGNOSIS — M76.01 GLUTEAL TENDONITIS OF RIGHT BUTTOCK: ICD-10-CM

## 2025-05-30 LAB — ANA SER QL: NEGATIVE

## 2025-05-30 RX ORDER — TRAMADOL HYDROCHLORIDE 50 MG/1
50-100 TABLET ORAL NIGHTLY PRN
Qty: 30 TABLET | Refills: 0 | Status: SHIPPED | OUTPATIENT
Start: 2025-05-30

## 2025-06-04 ENCOUNTER — TELEPHONE (OUTPATIENT)
Dept: FAMILY MEDICINE CLINIC | Facility: CLINIC | Age: 65
End: 2025-06-04
Payer: COMMERCIAL

## 2025-06-04 DIAGNOSIS — M76.01 GLUTEAL TENDONITIS OF RIGHT BUTTOCK: ICD-10-CM

## 2025-06-04 DIAGNOSIS — M70.62 TROCHANTERIC BURSITIS OF BOTH HIPS: ICD-10-CM

## 2025-06-04 DIAGNOSIS — M46.1 SACROILIITIS: ICD-10-CM

## 2025-06-04 DIAGNOSIS — M70.61 TROCHANTERIC BURSITIS OF BOTH HIPS: ICD-10-CM

## 2025-06-04 RX ORDER — TRAMADOL HYDROCHLORIDE 50 MG/1
50-100 TABLET ORAL NIGHTLY PRN
Qty: 30 TABLET | Refills: 0 | Status: SHIPPED | OUTPATIENT
Start: 2025-06-04

## 2025-06-04 NOTE — TELEPHONE ENCOUNTER
[facility] - [address] [facility] AT Portal to Registered [facility] - [phone]  - [phone] FX    Relationship: Pharmacy    Best call back number: [phone] OPTION 2      Who are you requesting to speak with (clinical staff, provider,  specific staff member):  RAMSES REF # [id number]    What was the call regarding: PHARMACY CALLING TO REPORT ALLERGY ALERT ON MEDICATION NOT ABLE TO SEND MEDICATION OUT UNTIL THEY SPEAK TO SOMEONE  IN THE OFFICE, UNABLE TO WT TO CLINIC, ATTEMPTED TO TRANSFER 2 TIMES

## 2025-06-05 NOTE — TELEPHONE ENCOUNTER
This was for tramadol and patient has allergy to codeine so they would not fill it until we verified.  (Tramadol does not have codeine in it)    Dr. Cali did resend to local pharmacy yesterday.

## 2025-06-13 ENCOUNTER — TREATMENT (OUTPATIENT)
Dept: PHYSICAL THERAPY | Facility: CLINIC | Age: 65
End: 2025-06-13
Payer: COMMERCIAL

## 2025-06-13 DIAGNOSIS — G89.29 CHRONIC BILATERAL LOW BACK PAIN WITHOUT SCIATICA: ICD-10-CM

## 2025-06-13 DIAGNOSIS — M70.61 TROCHANTERIC BURSITIS OF BOTH HIPS: Primary | ICD-10-CM

## 2025-06-13 DIAGNOSIS — M54.50 CHRONIC BILATERAL LOW BACK PAIN WITHOUT SCIATICA: ICD-10-CM

## 2025-06-13 DIAGNOSIS — M70.62 TROCHANTERIC BURSITIS OF BOTH HIPS: Primary | ICD-10-CM

## 2025-06-13 NOTE — PROGRESS NOTES
Physical Therapy Initial Evaluation and Plan of Care        [address], [address] [place] [address]     Patient: Sindy Martin   : 1960  Diagnosis/ICD-10 Code:  Trochanteric bursitis of both hips [M70.61, M70.62]  Referring practitioner: Rubens Cali MD  Date of Initial Visit: 2025  Today's Date: 2025  Patient seen for 1 sessions           Subjective Questionnaire: LEFS: 61/80      Subjective Evaluation    History of Present Illness  Mechanism of injury: Patient presents to PT for pain in bilateral hips (L>R). Patient points to bilateral SI joints. Denies pain at lateral or anterior hips. She also experiences low back pain, and pain in her neck that can radiate to the left shoulder blade. Her hips hurt when trying to sleep at night, and when walking up or down stairs. Has basement in her home. At night her blow back, hips and both legs will throb and ache. Relief from pain medication (taking tramadol), heat patches, or ointments. She has previously had SI injections in both hips, last in . Denies numbness or tingling.     Occupation:  from home, sitting at computer  Activities: would like to get a good nights sleep  PLOF: Independent  Medical Hx Reviewed. Denies hx of any hip or low back surgery.        Pain  Current pain ratin  At best pain ratin  At worst pain ratin  Location: Bilateral hips L>R, low back    Patient Goals  Patient goals for therapy: increased motion, decreased pain, return to sport/leisure activities and increased strength           Objective          Postural Observations    Additional Postural Observation Details  Iliac crests appear level in standing and supine  Left leg appears ~ 1/2 inch longer in supine, though equal in long sitting    Tenderness     Additional Tenderness Details  TTP L4-S2 spinous processes, bilateral PSIS (R>L), R QL    Non tender bilateral glute medius and piriformis    Active Range of Motion     Lumbar    Flexion: Active lumbar flexion: fingertips to ankles.   Extension: WFL  Left lateral flexion: WFL  Right lateral flexion: WFL  Left rotation: WFL  Right rotation: WFL    Additional Active Range of Motion Details  Bilateral hip IR/ER PROM WNL bialterally    Accessory motions: L2-5 slightly hypomobile, L3-4 causes reproduction of R side lower lumbar/SIJ symptoms    Strength/Myotome Testing     Left Hip   Planes of Motion   Flexion: 5  Extension: 4+  Abduction: 4  External rotation: 5  Internal rotation: 5    Right Hip   Planes of Motion   Flexion: 5  Extension: 4+  Abduction: 4  External rotation: 5  Internal rotation: 5    Left Knee   Flexion: 5  Extension: 5    Right Knee   Flexion: 5  Extension: 5    Tests     Lumbar     Left   Negative passive SLR.     Right   Positive passive SLR.     Left Pelvic Girdle/Sacrum   Negative: sacrum compression, gapping and sacral spring.     Right Pelvic Girdle/Sacrum   Negative: sacrum compression, gapping and sacral spring.     Left Hip   Negative Gaenslen's.     Right Hip   Negative Gaenslen's.     Additional Tests Details  Slump test negative bilaterally  Stork test negative bilaterally  MISTY negative bilaterally  Active SLR (-) on L, (+) on R    Lumbar Flexibility Comments:   Hamstrings slightly tight bilaterally      See Exercise, Manual, and Modality Logs for complete treatment.     Assessment & Plan       Assessment  Impairments: abnormal or restricted ROM, activity intolerance, impaired physical strength, lacks appropriate home exercise program and pain with function   Functional limitations: carrying objects, lifting, sleeping, walking, pulling, pushing, uncomfortable because of pain, moving in bed, sitting and standing   Assessment details: The patient is a 64 y.o. female who presents to physical therapy today for chronic bilateral SIJ pain and low back pain. Upon initial evaluation, the patient demonstrates the following impairments: shortening of right quadratus  lumborum potentially contributing to apparent leg length discrepancy, referral of symptoms to right SIJ when performing L3-4 PA mobilizations, decreased hip abduction strength bilaterally, and positive passive SLR test on the R side. Due to negative SIJ cluster testing, I believe her bilateral SIJ pain is originating from the lumbar spine. Due to the above impairments, the patient is unable to sleep without pain, ambulate up and down stairs without pain, or sit at a computer for a prolonged period of work without pain. The patient would benefit from PT services to address functional limitations and impairments and to improve patient quality of life.      Prognosis: good    Plan  Therapy options: will be seen for skilled therapy services  Planned modality interventions: cryotherapy, TENS, dry needling, thermotherapy (hydrocollator packs), electrical stimulation/Russian stimulation and traction  Planned therapy interventions: flexibility, strengthening, stretching, therapeutic activities, functional ROM exercises, home exercise program, joint mobilization, soft tissue mobilization, spinal/joint mobilization, manual therapy, neuromuscular re-education, postural training, abdominal trunk stabilization and body mechanics training  Frequency: 2x week  Duration in weeks: 12  Treatment plan discussed with: patient        Visit Diagnoses:    ICD-10-CM ICD-9-CM   1. Trochanteric bursitis of both hips  M70.61 726.5    M70.62    2. Chronic bilateral low back pain without sciatica  M54.50 724.2    G89.29 338.29       History # of Personal Factors and/or Comorbidities: HIGH (3+)  Examination of Body System(s): # of elements: MODERATE (3)  Clinical Presentation: STABLE   Clinical Decision Making: LOW       Timed:         Manual Therapy:         mins  20374;     Therapeutic Exercise:    8     mins  26211;     Neuromuscular Corey:        mins  34176;    Therapeutic Activity:          mins  13641;     Gait Training:           mins   44718;     Ultrasound:          mins  95361;    Ionto                                   mins   23274  Self Care                            mins   86609  Canalith Repos         mins 34425      Un-Timed:  Electrical Stimulation:         mins  20198 ( );  Dry Needling          mins self-pay  Traction          mins 44688  Low Eval     40     Mins  61949  Mod Eval          Mins  49891  High Eval                            Mins  93424  Re-Eval                               mins  56373    Timed Treatment:   8   mins   Total Treatment:     48   mins    PT SIGNATURE: Valeria Adkins, JADA         Initial Certification  Certification Period: 6/13/2025 thru 9/11/2025  I certify that the therapy services are furnished while this patient is under my care.  The services outlined above are required by this patient, and will be reviewed every 90 days.     PHYSICIAN: Rubens Cali MD      DATE:     Please sign and return via fax to [phone].. Thank you, [facility].

## 2025-06-16 ENCOUNTER — TREATMENT (OUTPATIENT)
Dept: PHYSICAL THERAPY | Facility: CLINIC | Age: 65
End: 2025-06-16
Payer: COMMERCIAL

## 2025-06-16 DIAGNOSIS — M70.62 TROCHANTERIC BURSITIS OF BOTH HIPS: Primary | ICD-10-CM

## 2025-06-16 DIAGNOSIS — M54.50 CHRONIC BILATERAL LOW BACK PAIN WITHOUT SCIATICA: ICD-10-CM

## 2025-06-16 DIAGNOSIS — G89.29 CHRONIC BILATERAL LOW BACK PAIN WITHOUT SCIATICA: ICD-10-CM

## 2025-06-16 DIAGNOSIS — M70.61 TROCHANTERIC BURSITIS OF BOTH HIPS: Primary | ICD-10-CM

## 2025-06-16 PROCEDURE — 97110 THERAPEUTIC EXERCISES: CPT | Performed by: PHYSICAL THERAPIST

## 2025-06-16 NOTE — PROGRESS NOTES
Physical Therapy Daily Treatment Note  [facility], [address] [place] [address]     Patient: Sindy Martin   : 1960  Referring practitioner: Rubens Cali MD  Diagnosis:      ICD-10-CM ICD-9-CM   1. Trochanteric bursitis of both hips  M70.61 726.5    M70.62    2. Chronic bilateral low back pain without sciatica  M54.50 724.2    G89.29 338.29     Date of Initial Visit: Type: THERAPY  Noted: 2025  Today's Date: 2025    VISIT#: 2          Subjective   Sindy Martin reports: low back stiffness when waking up in morning. It improves as she gets up and moves around. Pain has shifted from being more in left hip to now more in back of right hip (at level of waistline). Feels like a constant sore muscle. Right hip wakes her in the night. She tried HEP stretch and felt no worse after.     Objective   See Exercise, Manual, and Modality Logs for complete treatment.       Assessment & Plan       Assessment  Assessment details: Tender to L4-5 paraspinals, R>L. Non tender QLs. Patient reports symptoms the same end of session, like a pulling/aching sensation at right PSIS region.           Progress per Plan of Care           Timed:  Manual Therapy:    5     mins  25698;  Therapeutic Exercise:    25     mins  01543;     Neuromuscular Corey:        mins  56162;    Therapeutic Activity:          mins  87270;     Gait Training:           mins  60918;     Ultrasound:          mins  99477;    Electrical Stimulation:         mins  03840 ( );    Untimed:  Electrical Stimulation:         mins  96636 ( );  Mechanical Traction:         mins  24572;   Dry needling:       Self pay    Timed Treatment:   30   mins   Total Treatment:     30   mins  Valeria Adkins, PT, DPT, CLT, CIDN  Physical Therapist

## 2025-06-17 DIAGNOSIS — M76.01 GLUTEAL TENDONITIS OF RIGHT BUTTOCK: ICD-10-CM

## 2025-06-17 DIAGNOSIS — M54.50 CHRONIC BILATERAL LOW BACK PAIN WITHOUT SCIATICA: Primary | ICD-10-CM

## 2025-06-17 DIAGNOSIS — G89.29 CHRONIC BILATERAL LOW BACK PAIN WITHOUT SCIATICA: Primary | ICD-10-CM

## 2025-06-17 DIAGNOSIS — M46.1 SACROILIITIS: Primary | ICD-10-CM

## 2025-06-17 DIAGNOSIS — M70.61 TROCHANTERIC BURSITIS OF BOTH HIPS: ICD-10-CM

## 2025-06-17 DIAGNOSIS — M70.62 TROCHANTERIC BURSITIS OF BOTH HIPS: ICD-10-CM

## 2025-06-17 RX ORDER — TRAMADOL HYDROCHLORIDE 200 MG/1
1 TABLET, FILM COATED, EXTENDED RELEASE ORAL NIGHTLY
Qty: 30 TABLET | Refills: 0 | Status: SHIPPED | OUTPATIENT
Start: 2025-06-17 | End: 2025-07-17

## 2025-06-23 ENCOUNTER — TREATMENT (OUTPATIENT)
Dept: PHYSICAL THERAPY | Facility: CLINIC | Age: 65
End: 2025-06-23
Payer: COMMERCIAL

## 2025-06-23 DIAGNOSIS — M54.50 CHRONIC BILATERAL LOW BACK PAIN WITHOUT SCIATICA: ICD-10-CM

## 2025-06-23 DIAGNOSIS — M70.62 TROCHANTERIC BURSITIS OF BOTH HIPS: Primary | ICD-10-CM

## 2025-06-23 DIAGNOSIS — G89.29 CHRONIC BILATERAL LOW BACK PAIN WITHOUT SCIATICA: ICD-10-CM

## 2025-06-23 DIAGNOSIS — M70.61 TROCHANTERIC BURSITIS OF BOTH HIPS: Primary | ICD-10-CM

## 2025-06-23 PROCEDURE — 97112 NEUROMUSCULAR REEDUCATION: CPT | Performed by: PHYSICAL THERAPIST

## 2025-06-23 PROCEDURE — 97110 THERAPEUTIC EXERCISES: CPT | Performed by: PHYSICAL THERAPIST

## 2025-06-23 NOTE — PROGRESS NOTES
Physical Therapy Daily Treatment Note  [facility], [address] [place] [address]     Patient: Sindy Martin   : 1960  Referring practitioner: Rubens Cali MD  Diagnosis:      ICD-10-CM ICD-9-CM   1. Trochanteric bursitis of both hips  M70.61 726.5    M70.62    2. Chronic bilateral low back pain without sciatica  M54.50 724.2    G89.29 338.29     Date of Initial Visit: Type: THERAPY  Noted: 2025  Today's Date: 2025    VISIT#: 3          Subjective   Sindy Martin reports: she did exercises before leaving the house. She feels pretty good today, though has not done a lot. It usually bothers her more in the evening and with standing/yard work. Improved with sitting. Her physician would like her to come in for an MRI. Slight tightness at right buttock currently.    Objective   See Exercise, Manual, and Modality Logs for complete treatment.       Assessment & Plan       Assessment  Assessment details: Symptom description aligns with stenosis. Patient with slight right side low back/SIJ pulling during supine marching and when standing on RLE single leg stance.           Progress per Plan of Care           Timed:  Manual Therapy:         mins  52050;  Therapeutic Exercise:    17     mins  86930;     Neuromuscular Corey:    10    mins  83711;    Therapeutic Activity:          mins  85337;     Gait Training:           mins  92619;     Ultrasound:          mins  42884;    Electrical Stimulation:         mins  67258 ( );    Untimed:  Electrical Stimulation:         mins  74283 ( );  Mechanical Traction:         mins  80580;   Dry needling:       Self pay    Timed Treatment:   27   mins   Total Treatment:     27   mins  Valeria Adkins, PT, DPT, CLT, CIDN  Physical Therapist

## 2025-06-24 ENCOUNTER — HOSPITAL ENCOUNTER (OUTPATIENT)
Dept: MRI IMAGING | Facility: HOSPITAL | Age: 65
Discharge: HOME | End: 2025-06-24
Admitting: FAMILY MEDICINE
Payer: COMMERCIAL

## 2025-06-24 DIAGNOSIS — M54.50 CHRONIC BILATERAL LOW BACK PAIN WITHOUT SCIATICA: ICD-10-CM

## 2025-06-24 DIAGNOSIS — M54.50 CHRONIC BILATERAL LOW BACK PAIN WITHOUT SCIATICA: Primary | ICD-10-CM

## 2025-06-24 DIAGNOSIS — G89.29 CHRONIC BILATERAL LOW BACK PAIN WITHOUT SCIATICA: Primary | ICD-10-CM

## 2025-06-24 DIAGNOSIS — G89.29 CHRONIC BILATERAL LOW BACK PAIN WITHOUT SCIATICA: ICD-10-CM

## 2025-06-24 PROCEDURE — 72148 MRI LUMBAR SPINE W/O DYE: CPT

## 2025-06-24 NOTE — TELEPHONE ENCOUNTER
Tramadol ER needs to be sent as generic  Ultram ER. They do not have Ryzolt .    1 tablet [facility]  #30    I made correction and re-entered to sign

## 2025-06-24 NOTE — TELEPHONE ENCOUNTER
Pharmacy Name: [facility] - JOSSE VILLALBA - [address] AT PORTAL TO REGISTERED [facility] - [phone]  - [phone] FX     Reference Number (if applicable):  [id number]    Pharmacy representative name: ANNMARIE     Pharmacy representative phone number: [phone] OPTION 2     What medication are you calling in regards to:     traMADol HCl, ER Biphasic, (RYZOLT) 200 MG tablet sustained-release 24 hour     What question does the pharmacy have:     THIS MEDICATION WILL BE DISCONTINUED /AND THEY DO NOT HAVE IT IN STOCK     CAN YOU CHANGE TO ANOTHER MEDICATION       Who is the provider that prescribed the medication:   Authorized By: Rubens Cali MD

## 2025-06-25 RX ORDER — TRAMADOL HYDROCHLORIDE 200 MG/1
200 TABLET, EXTENDED RELEASE ORAL NIGHTLY PRN
Qty: 30 TABLET | Refills: 0 | Status: SHIPPED | OUTPATIENT
Start: 2025-06-25

## 2025-06-27 ENCOUNTER — TREATMENT (OUTPATIENT)
Dept: PHYSICAL THERAPY | Facility: CLINIC | Age: 65
End: 2025-06-27
Payer: COMMERCIAL

## 2025-06-27 DIAGNOSIS — G89.29 CHRONIC BILATERAL LOW BACK PAIN WITHOUT SCIATICA: ICD-10-CM

## 2025-06-27 DIAGNOSIS — M70.62 TROCHANTERIC BURSITIS OF BOTH HIPS: Primary | ICD-10-CM

## 2025-06-27 DIAGNOSIS — M54.50 CHRONIC BILATERAL LOW BACK PAIN WITHOUT SCIATICA: ICD-10-CM

## 2025-06-27 DIAGNOSIS — M70.61 TROCHANTERIC BURSITIS OF BOTH HIPS: Primary | ICD-10-CM

## 2025-06-27 RX ORDER — MELOXICAM 15 MG/1
15 TABLET ORAL DAILY
Qty: 90 TABLET | Refills: 0 | Status: SHIPPED | OUTPATIENT
Start: 2025-06-27

## 2025-06-27 NOTE — PROGRESS NOTES
Physical Therapy Daily Treatment Note  [address], [address] [place] [address]     Patient: Sindy Martin   : 1960  Referring practitioner: Rubens Cali MD  Diagnosis:      ICD-10-CM ICD-9-CM   1. Trochanteric bursitis of both hips  M70.61 726.5    M70.62    2. Chronic bilateral low back pain without sciatica  M54.50 724.2    G89.29 338.29     Date of Initial Visit: Type: THERAPY  Noted: 2025  Today's Date: 2025    VISIT#: 4          Subjective   Sindy Martin reports: She got her MRI results back. She reviewed the results with a doctor at the office and is scheduled to see Leisa Belle in neurosurgery Monday.     Objective   See Exercise, Manual, and Modality Logs for complete treatment.       Assessment & Plan       Assessment  Assessment details: Reviewed MRI results with patient. Experiences relief with forward flexion to L.     Plan  Plan details: Continue lumbar opening and neuro re-ed for lumbo-hip strength. Possible traction.                     Timed:  Manual Therapy:         mins  24179;  Therapeutic Exercise:    23     mins  67926;     Neuromuscular Corey:    8    mins  20732;    Therapeutic Activity:          mins  32365;     Gait Training:           mins  80488;     Ultrasound:          mins  02034;    Electrical Stimulation:         mins  04085 ( );    Untimed:  Electrical Stimulation:         mins  59997 ( );  Mechanical Traction:         mins  55944;   Dry needling:       Self pay    Timed Treatment:   31   mins   Total Treatment:     31   mins  Valeria Adkins, PT, DPT, CLT, CIDN  Physical Therapist

## 2025-06-27 NOTE — PROGRESS NOTES
Subjective     Chief Complaint   Patient presents with    Back Pain         Previous Treatment:   NSAID'S, Tylenol, and Muscle Relaxants,pt-4 visits    HPI: Sindy Martin is a 64 y.o. female who presents to clinic with chronic low back pain with radicular symptoms into her right buttock.  Patient states that she has been having these issues for quite a few years and even used to get SI joint injections in her right SI with Dr. Turner vicente in 2023.  Patient states that she has been having right hip pain with throbbing and aching.  She states that it is on both sides, but worse on the right than the left.  Patient states that when she had her last injection in 2023 she got 100% relief.  She states that she does not have a lot of pain and states that it is its mainly at 3/10 pain.  She states that she she has also been having issues at night with a dull achy pain in her bilateral calfs.  She states that she wakes up 2-3 times at night and applies heat.  She was recently diagnosed with sleep apnea and states that since wearing her CPAP her symptoms have slightly improved.  Patient states that her symptoms are worse when going up and down steps, standing for long periods of time or sitting for long peers of time.  Patient has been taking Aleve at her PCP recently prescribed her tramadol.  Patient cannot take ibuprofen.  She denies any bowel or bladder incontinence or saddle anesthesia at this time.        PMH:  Past Medical History:   Diagnosis Date    Allergic     Anemia 2016    Anesthesia complication     Brain concussion 1996    Cancer 2014    Cataract     Cervical disc disorder 1997 approx.    Claustrophobia Sometimes    Depression     GERD (gastroesophageal reflux disease)     Headache 1999    Hearing loss     Hip arthrosis 2019, 2020    Hypertension     Hypothyroidism     Knee swelling 2012    Metabolic syndrome     Neck strain Off an on over the years.    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  Send 100 test strips.  Send 100 lancets.  Patient to test fasting every morning and prior to evening meal.  Test 3 days a week, Disp: 1 each, Rfl: 1    buPROPion XL (WELLBUTRIN XL) 150 MG 24 hr tablet, Take 3 tablets by mouth Every Morning., Disp: , Rfl:     famotidine (PEPCID) 40 MG tablet, Take 1 tablet by mouth Daily., Disp: 90 tablet, Rfl: 3    glimepiride (Amaryl) 1 MG tablet, Take 1 tablet by mouth Every Morning Before Breakfast., Disp: 90 tablet, Rfl: 2    loratadine (CLARITIN) 10 MG tablet, Take 1 tablet by mouth Daily., Disp: , Rfl:     Magnesium 250 MG tablet, Take 2 tablets by mouth Daily., Disp: , Rfl:     meloxicam (MOBIC) 15 MG tablet, Take 1 tablet by mouth Daily., Disp: 90 tablet, Rfl: 0    metFORMIN (GLUCOPHAGE) 500 MG tablet, TAKE 2 TABLETS TWICE A DAY, Disp: 360 tablet, Rfl: 3    multivitamin with minerals tablet tablet, Take 1 tablet by mouth Daily., Disp: , Rfl:     pantoprazole (PROTONIX) 40 MG EC tablet, Take 1 tablet by mouth Daily., Disp: , Rfl:     Synthroid 50 MCG tablet, TAKE 1 TABLET DAILY, Disp: 90 tablet, Rfl: 1    traMADol ER (ULTRAM-ER) 200 MG 24 hr tablet, Take 1 tablet by mouth At Night As Needed for Moderate Pain., Disp: 30 tablet, Rfl: 0    ALOE VERA JUICE PO, Take 2 oz by mouth 2 (Two) Times a Day., Disp: , Rfl:     Continuous Glucose Sensor (Dexcom G7 Sensor) misc, Use 1 Device Every 10 (Ten) Days., Disp: 3 each, Rfl: 6    cyclobenzaprine (FLEXERIL) 5 MG tablet, Take 1 tablet by mouth Daily., Disp: 15 tablet, Rfl: 0    escitalopram (LEXAPRO) 10 MG tablet, Take 3 tablets by mouth Daily for 30 days., Disp: 90  tablet, Rfl: 0    traMADol (ULTRAM) 50 MG tablet, Take 1-2 tablets by mouth At Night As Needed for Moderate Pain or Severe Pain., Disp: 30 tablet, Rfl: 0  No current facility-administered medications for this visit.    Facility-Administered Medications Ordered in Other Visits:     acetaminophen (TYLENOL) tablet 650 mg, 650 mg, Oral, Q4H PRN **OR** acetaminophen (TYLENOL) 160 MG/5ML oral solution 650 mg, 650 mg, Oral, Q4H PRN **OR** acetaminophen (TYLENOL) suppository 660 mg, 660 mg, Rectal, Q4H PRN, Jairo Cano MD    aluminum-magnesium hydroxide-simethicone (MAALOX MAX) 400-400-40 MG/5ML suspension 15 mL, 15 mL, Oral, Q6H PRN, Jairo Cano MD    [DISCONTINUED] sennosides-docusate (PERICOLACE) 8.6-50 MG per tablet 2 tablet, 2 tablet, Oral, BID PRN **AND** [DISCONTINUED] polyethylene glycol (MIRALAX) packet 17 g, 17 g, Oral, Daily PRN **AND** [DISCONTINUED] bisacodyl (DULCOLAX) EC tablet 5 mg, 5 mg, Oral, Daily PRN **AND** bisacodyl (DULCOLAX) suppository 10 mg, 10 mg, Rectal, Daily PRN, Jairo Cano MD    dextrose (D50W) (25 g/50 mL) IV injection 25 g, 25 g, Intravenous, Q15 Min PRN, Jairo Cano MD    dextrose (GLUTOSE) oral gel 15 g, 15 g, Oral, Q15 Min PRN, Jairo Cano MD    glucagon (GLUCAGEN) injection 1 mg, 1 mg, Intramuscular, Q15 Min PRN, Jairo Cano MD    insulin lispro (HUMALOG/ADMELOG) injection 2-7 Units, 2-7 Units, Subcutaneous, 4x Daily AC & at Bedtime, Jairo Cano MD    ipratropium-albuterol (DUO-NEB) nebulizer solution 3 mL, 3 mL, Nebulization, 4x Daily - RT, Jairo Cano MD    melatonin tablet 5 mg, 5 mg, Oral, Nightly PRNJerome Yadana, MD    Potassium Replacement - Follow Nurse / BPA Driven Protocol, , Not Applicable, PRNJerome Yadana, MD    sodium chloride 0.9 % flush 10 mL, 10 mL, Intravenous, Q12H, Jairo Cano MD    sodium chloride 0.9 % flush 10 mL, 10 mL, Intravenous, Jerome LONDONO Yadana, MD    sodium chloride 0.9 % infusion 40 mL, 40 mL, Intravenous, BRY, Jairo Cano MD     Allergies   Allergen  Reactions    Codeine Nausea And Vomiting    Ibuprofen Anaphylaxis        Past Surgical History:   Procedure Laterality Date    APPENDECTOMY      BREAST BIOPSY      BREAST LUMPECTOMY      CARPAL TUNNEL RELEASE      CHOLECYSTECTOMY      COLONOSCOPY  2018 (?)    HAND SURGERY  1994 and 2018(?)    R. release, 1994; L. release 2018    HYSTERECTOMY      LYMPH NODE BIOPSY  2014    [place] node under left arm    OOPHORECTOMY      UPPER ENDOSCOPIC ULTRASOUND W/ FNA N/A 04/28/2022    Procedure: EUS WITH BIOPSY;  Surgeon: Roldan Mayer MD;  Location: [facility] ENDOSCOPY;  Service: Gastroenterology;  Laterality: N/A;  HIATEL HERNIA, GASTRIC POLYP    UPPER ENDOSCOPIC ULTRASOUND W/ FNA N/A 07/09/2024    Procedure: Esophagogastroduodenoscopy with biopsy x 2 areas and ENDOSCOPIC ULTRASOUND;  Surgeon: Roldan Mayer MD;  Location: [facility] ENDOSCOPY;  Service: Gastroenterology;  Laterality: N/A;  post op: gastritis, gastric polyp, small sliding hiatal hernia, pancreatic lesion        Family History   Problem Relation Age of Onset    Arthritis Mother     Gout Mother     Parkinsonism Mother     Miscarriages / Stillbirths Mother         Miscarriage of second child    Lung cancer Father     Arthritis Father     Cancer Father     Depression Father     Mental illness Father     Lung cancer Maternal Aunt     Cancer Maternal Aunt     Uterine cancer Paternal Aunt 60    Cancer Paternal Aunt     Prostate cancer Paternal Uncle     Cancer Paternal Uncle     Melanoma Paternal Grandmother     Cancer Paternal Grandmother     Depression Paternal Grandmother     Liver disease Paternal Grandmother     Mental illness Paternal Grandmother     Prostate cancer Paternal Cousin     Melanoma Paternal Cousin     Esophageal cancer Paternal Cousin     Thyroid cancer Maternal Cousin 40    Esophageal cancer Maternal Cousin     Ovarian cancer Maternal Cousin 50    Heart disease Maternal Grandfather     Diabetes Maternal Grandmother     Alcohol abuse Maternal Uncle  "    COPD Maternal Uncle     Heart disease Maternal Uncle     Heart disease Paternal Uncle     Liver disease Maternal Uncle     Alcohol abuse Maternal Uncle     COPD Maternal Uncle     Heart disease Paternal Uncle     Heart disease Maternal Uncle     Liver disease Maternal Uncle          Social Hx:  Social History     Tobacco Use   Smoking Status Never   Smokeless Tobacco Never      Alcohol Use: Not At Risk (7/9/2024)    AUDIT-C     Frequency of Alcohol Consumption: Never     Average Number of Drinks: Patient does not drink     Frequency of Binge Drinking: Never      Social History     Substance and Sexual Activity   Drug Use Never          Review of Systems   Constitutional:  Positive for activity change.   HENT: Negative.  Negative for congestion.    Eyes: Negative.    Respiratory: Negative.     Cardiovascular: Negative.    Gastrointestinal: Negative.    Endocrine: Negative.    Genitourinary: Negative.    Musculoskeletal:  Positive for arthralgias, back pain (dull,ache-lower-buttocks) and myalgias.   Skin: Negative.    Allergic/Immunologic: Negative.    Neurological: Negative.    Hematological: Negative.    Psychiatric/Behavioral:  Positive for sleep disturbance.          Objective     /75   Pulse 85   Resp 18   Ht 154.9 cm (61\")   Wt 98.9 kg (218 lb)   LMP  (LMP Unknown) Comment: Had hystorectomy March 2015  SpO2 96%   BMI 41.19 kg/m²    Body mass index is 41.19 kg/m².      Physical Exam  Vitals reviewed.   Musculoskeletal:         General: Normal range of motion.      Cervical back: Normal range of motion.   Skin:     General: Skin is warm and dry.   Neurological:      General: No focal deficit present.      Deep Tendon Reflexes:      Reflex Scores:       Patellar reflexes are 2+ on the right side and 2+ on the left side.  Psychiatric:         Mood and Affect: Mood normal.         Speech: Speech normal.          Neurological Exam  Mental Status  Awake, alert and oriented to person, place and time. " Oriented to person, place and time. Speech is normal. Language is fluent with no aphasia.    Motor    5/5 strength in lower extremity.    Sensory  She reports no sensory deficit.    Reflexes                                            Right                      Left  Patellar                                2+                         2+    Right pathological reflexes: Nunu's absent. Ankle clonus absent.  Left pathological reflexes: Nunu's absent. Ankle clonus absent.          Results Review  I personally reviewed and interpreted the images from the following studies:    MRI Lumbar Spine Without Contrast  Result Date: 6/24/2025  MRI LUMBAR SPINE WO CONTRAST Date of Exam: 6/24/2025 6:37 AM EDT Indication: Back pain.  Comparison: CT abdomen pelvis dated September 18, 2018. Technique:  Routine multiplanar/multisequence sequence images of the lumbar spine were obtained without contrast administration.  Findings: 5 lumbar type vertebral bodies, with the lowest disc space designated L5-S1. Utilizing this numbering system, the conus terminates at the approximate level of L1. Scattered loss of disc height with degenerative endplate signal changes, scattered disc desiccation, and scattered Schmorl's node formation are noted. T12-L1: Posterior disc extrusion indenting the thecal sac. Tiny annular fissure may be present in the posterior disc. No evidence for central canal stenosis. Ligamentum flavum hypertrophy and mild facet hypertrophy are noted. Neural foramina appear grossly patent. Perineural cysts or nerve root sleeve cysts are noted in the neural foramina bilaterally. L1-L2: Posterior disc extrusion containing an annular fissure, effacing the ventral subarachnoid space and slightly indenting the thecal sac. No evidence for central canal stenosis. Narrowing of lateral recesses. Ligamentum flavum hypertrophy and mild to  moderate facet hypertrophy noted. Mild bilateral neural foraminal stenosis. Perineural cyst or  nerve root sleeve cyst is noted in the right neural foramen. L2-L3: Posterior disc osteophyte complex/disc extrusion containing an annular fissure, effacing the ventral subarachnoid space. No evidence for central canal stenosis. Marked narrowing of lateral recesses. Ligamentum flavum hypertrophy and severe facet hypertrophy noted. Facet joint effusions bilaterally. Severe right and mild to moderate left neural foraminal stenosis. A foraminal/lateral disc extrusion contacts and displaces the right exiting nerve root. The disc osteophyte complex may also contact the traversing nerve roots bilaterally particularly on the right. L3-L4: Posterior disc osteophyte complex/disc extrusion containing an annular fissure, effacing the ventral subarachnoid space. No evidence for central canal stenosis. Marked narrowing of lateral recesses. Ligamentum flavum hypertrophy and severe facet hypertrophy noted. Severe right and moderate to severe left neural foraminal stenosis. Foraminal/lateral components of the disc extrusion appear to contact and displace the exiting nerve roots bilaterally particularly on the right. L4-L5: Posterior disc osteophyte complex containing an annular fissure, effacing the ventral subarachnoid space. No evidence for central canal stenosis. Marked narrowing of lateral recesses. Ligamentum flavum hypertrophy and severe facet hypertrophy are noted. Severe right and moderate to severe left neural foraminal stenosis. A foraminal/lateral component of the disc extrusion contacts and displaces the right exiting nerve root. L5-S1: Posterior disc osteophyte complex/disc extrusion containing an annular fissure, effacing the ventral subarachnoid space. The disc extrusion appears to extend slightly inferior to this disc level (subarticular). There may be borderline canal stenosis at this level. Ligamentum flavum hypertrophy and severe facet hypertrophy noted. Facet joint effusions bilaterally. Marked narrowing of  lateral recesses. Severe bilateral neural foraminal stenosis. Foraminal/lateral components of the disc extrusion contact the exiting nerve roots bilaterally particularly on the right. The disc extrusion may also contact the traversing nerve roots bilaterally.     Impression: Impression: Degenerative changes throughout the lumbar spine, with lateral recess stenosis, facet hypertrophy, and neural foraminal stenosis with mass effect upon nerve roots, as detailed above. Other findings as noted above. Electronically Signed: Amisha Joiner MD  6/24/2025 6:35 PM EDT  Workstation ID: [id number]              Assessment & Plan     MDM: Sindy Martin is a 64 y.o. female who presents to clinic for chronic low back pain with radicular symptoms down her right buttock.  Patient states has been going on for quite some time.  She is to get right SI joint injections with Dr. Turner vicente in 2023 where she got 100% relief.  Patient states that she has it in both, but right is worse than left.  Patient also states that she has been having issues in her bilateral calfs where it wakes her up 2-3 times at night.  She was recently diagnosed with sleep apnea and states that since wearing her CPAP machine though symptoms have improved.    Patient had an MRI of her lumbar spine that does show severe neuroforaminal stenosis throughout her lumbar spine.  She does not have any high-grade canal stenosis throughout her lumbar spine.    On physical examination patient has great strength in her lower extremities, has good patellar reflexes, I do not appreciate clonus.  Patient does have some positive SI joint maneuvers such as positive Kamila, Casey sign and thigh thrust on the right.    At this time due to the patient having positive surgical pathology at L4-5 and stating that she is having some dull achy pain around her calf muscles at night I am sending her to get an injection at L4-5.  I told the patient I am not convinced that her symptoms in  her bilateral calves at night are coming from her low back, but I would like to rule out any possibility.  When she completes her injection she is to come back to clinic to see much better when she received.    I prescribed the patient Flexeril 5 mg to help with her symptoms at nighttime.  She is to only take it before going to bed.  I told the patient to try it tonight and see if she has any adverse effects.  I told her that if she is to have any adverse effects or to have no relief to please stop taking the medication.  Patient verbally agreed.    Should patient have any new or worsening symptoms she is to call the office.  Patient agreeable to this plan.    I also ordered an updated MRI of her cervical spine due to patient having history of bulging disc in her cervical spine and having occasional radiculopathy in her left arm.  She has not had an updated MRI for a while so I am ordering 1 just to make sure there is no severe findings.       Diagnosis Plan   1. Chronic bilateral low back pain without sciatica  MRI Cervical Spine Without Contrast    Epidural Block      2. Chronic SI joint pain  MRI Cervical Spine Without Contrast      3. Lumbar radiculopathy  Epidural Block          Return After injection, for Recheck.      Sindy Martin  reports that she has never smoked. She has never used smokeless tobacco.               This patient was examined wearing appropriate personal protective equipment.            Leisa Belle PA-C    06/30/25  11:06 EDT      Part of this note may be an electronic transcription/translation of spoken language to printed text using the Dragon Dictation System.

## 2025-06-30 ENCOUNTER — TREATMENT (OUTPATIENT)
Dept: PHYSICAL THERAPY | Facility: CLINIC | Age: 65
End: 2025-06-30
Payer: COMMERCIAL

## 2025-06-30 ENCOUNTER — OFFICE VISIT (OUTPATIENT)
Dept: NEUROSURGERY | Facility: CLINIC | Age: 65
End: 2025-06-30
Payer: COMMERCIAL

## 2025-06-30 VITALS
RESPIRATION RATE: 18 BRPM | HEART RATE: 85 BPM | SYSTOLIC BLOOD PRESSURE: 137 MMHG | HEIGHT: 61 IN | OXYGEN SATURATION: 96 % | DIASTOLIC BLOOD PRESSURE: 75 MMHG | WEIGHT: 218 LBS | BODY MASS INDEX: 41.16 KG/M2

## 2025-06-30 DIAGNOSIS — G89.29 CHRONIC SI JOINT PAIN: ICD-10-CM

## 2025-06-30 DIAGNOSIS — G89.29 CHRONIC BILATERAL LOW BACK PAIN WITHOUT SCIATICA: ICD-10-CM

## 2025-06-30 DIAGNOSIS — M70.62 TROCHANTERIC BURSITIS OF BOTH HIPS: Primary | ICD-10-CM

## 2025-06-30 DIAGNOSIS — G89.29 CHRONIC BILATERAL LOW BACK PAIN WITHOUT SCIATICA: Primary | ICD-10-CM

## 2025-06-30 DIAGNOSIS — M70.61 TROCHANTERIC BURSITIS OF BOTH HIPS: Primary | ICD-10-CM

## 2025-06-30 DIAGNOSIS — M54.16 LUMBAR RADICULOPATHY: ICD-10-CM

## 2025-06-30 DIAGNOSIS — M54.50 CHRONIC BILATERAL LOW BACK PAIN WITHOUT SCIATICA: ICD-10-CM

## 2025-06-30 DIAGNOSIS — M53.3 CHRONIC SI JOINT PAIN: ICD-10-CM

## 2025-06-30 DIAGNOSIS — M54.50 CHRONIC BILATERAL LOW BACK PAIN WITHOUT SCIATICA: Primary | ICD-10-CM

## 2025-06-30 PROCEDURE — 97110 THERAPEUTIC EXERCISES: CPT | Performed by: PHYSICAL THERAPIST

## 2025-06-30 PROCEDURE — 97112 NEUROMUSCULAR REEDUCATION: CPT | Performed by: PHYSICAL THERAPIST

## 2025-06-30 RX ORDER — CYCLOBENZAPRINE HCL 5 MG
5 TABLET ORAL DAILY
Qty: 15 TABLET | Refills: 0 | Status: SHIPPED | OUTPATIENT
Start: 2025-06-30

## 2025-06-30 NOTE — PROGRESS NOTES
Physical Therapy Daily Treatment Note  [facility] - [facility]  [address], [address]   [place] [address]     Patient: Sindy Martin   : 1960  Referring practitioner: Rubens Cali MD  Diagnosis:      ICD-10-CM ICD-9-CM   1. Trochanteric bursitis of both hips  M70.61 726.5    M70.62    2. Chronic bilateral low back pain without sciatica  M54.50 724.2    G89.29 338.29     Date of Initial Visit: Type: THERAPY  Noted: 2025  Today's Date: 2025    VISIT#: 5            Subjective   Sindy Martin reports: Saw neurosurgery this morning for consult. They want to get an MRI of the cervical spine due to some other symptoms there. Leisa Belle recommended that patient see Dr. Tompkins for a lumbar injection to help rule out SIJ as part of the cause. No symptoms at present. Patient felt stiff and sore on Saturday.     Objective   See Exercise, Manual, and Modality Logs for complete treatment.       ASSESSMENT:   Cues for trunk stability during exercise. Patient tolerated all exercises well without symptoms, does well with hip exercise bet over table to relieve spine. She reports she feels loose and good when leaving PT.      PLAN:  Continue exercises to open lumbar spine, potential manual or mechanical traction, lumbo-hip strengthening.       Timed:  Manual Therapy:         mins  65410;  Therapeutic Exercise:    23     mins  28888;     Neuromuscular Corey:    8    mins  03232;    Therapeutic Activity:          mins  61519;     Gait Training:           mins  18357;     Ultrasound:          mins  46043;    Electrical Stimulation:         mins  75652 ( );    Untimed:  Electrical Stimulation:         mins  27119 ( );  Mechanical Traction:         mins  63349;   Dry needling:       Self pay      Timed Treatment:   31   mins   Total Treatment:     31   mins      Valeria Adkins PT, DPT  Physical Therapist  [place] Lic #: [id number]

## 2025-07-02 ENCOUNTER — PATIENT MESSAGE (OUTPATIENT)
Dept: FAMILY MEDICINE CLINIC | Facility: CLINIC | Age: 65
End: 2025-07-02
Payer: COMMERCIAL

## 2025-07-02 DIAGNOSIS — G89.29 CHRONIC BILATERAL LOW BACK PAIN WITHOUT SCIATICA: ICD-10-CM

## 2025-07-02 DIAGNOSIS — M54.50 CHRONIC BILATERAL LOW BACK PAIN WITHOUT SCIATICA: ICD-10-CM

## 2025-07-03 ENCOUNTER — PATIENT ROUNDING (BHMG ONLY) (OUTPATIENT)
Dept: NEUROSURGERY | Facility: CLINIC | Age: 65
End: 2025-07-03
Payer: COMMERCIAL

## 2025-07-07 ENCOUNTER — TREATMENT (OUTPATIENT)
Dept: PHYSICAL THERAPY | Facility: CLINIC | Age: 65
End: 2025-07-07
Payer: COMMERCIAL

## 2025-07-07 DIAGNOSIS — M54.50 CHRONIC BILATERAL LOW BACK PAIN WITHOUT SCIATICA: ICD-10-CM

## 2025-07-07 DIAGNOSIS — M70.62 TROCHANTERIC BURSITIS OF BOTH HIPS: Primary | ICD-10-CM

## 2025-07-07 DIAGNOSIS — M70.61 TROCHANTERIC BURSITIS OF BOTH HIPS: Primary | ICD-10-CM

## 2025-07-07 DIAGNOSIS — G89.29 CHRONIC BILATERAL LOW BACK PAIN WITHOUT SCIATICA: ICD-10-CM

## 2025-07-07 PROCEDURE — 97112 NEUROMUSCULAR REEDUCATION: CPT | Performed by: PHYSICAL THERAPIST

## 2025-07-07 PROCEDURE — 97012 MECHANICAL TRACTION THERAPY: CPT | Performed by: PHYSICAL THERAPIST

## 2025-07-07 PROCEDURE — 97110 THERAPEUTIC EXERCISES: CPT | Performed by: PHYSICAL THERAPIST

## 2025-07-07 NOTE — PROGRESS NOTES
Physical Therapy Daily Treatment Note  [facility] Therapy - [facility]  [address], [address]   [place] [address]     Patient: Sindy Martin   : 1960  Referring practitioner: Rubens Cali MD  Diagnosis:      ICD-10-CM ICD-9-CM   1. Trochanteric bursitis of both hips  M70.61 726.5    M70.62    2. Chronic bilateral low back pain without sciatica  M54.50 724.2    G89.29 338.29     Date of Initial Visit: Type: THERAPY  Noted: 2025  Today's Date: 2025    VISIT#: 6            Subjective   Sindy Martin reports: has some low back discomfort at present, 3-4/10 across low back. She reports that she spent time in the pool yesterday and may have overdone some gentle exercises with the arms and legs. Is awaiting a call form Dr. Tompkins's office to schedule.     Objective   See Exercise, Manual, and Modality Logs for complete treatment.       ASSESSMENT:   Reports manual distraction feels good. Increased tolerance to glute bridges compared to previous sessions. Right SIJ symptoms occur with end range right hip abduction as well as end range left hip extension when standing on right leg - resolved with doing a smaller range of motion for each move. Feels slight pulling/tug at right SIJ at start of mechanical traction which improved/resolved with time.       PLAN:  Continue traction, exercises for lumbo-pelvic stabilization and lumbosacral decompression.       Timed:  Manual Therapy:    2     mins  92427;  Therapeutic Exercise:    24     mins  17230;     Neuromuscular Corey:    8    mins  11184;    Therapeutic Activity:          mins  77235;     Gait Training:           mins  53888;     Ultrasound:          mins  76090;    Electrical Stimulation:         mins  04049 ( );    Untimed:  Electrical Stimulation:         mins  41257 ( );  Mechanical Traction:    14     mins  88838;   Dry needling:       Self pay      Timed Treatment:   34   mins   Total Treatment:     48    mins      Valeria Adkins, PT, DPT  Physical Therapist  [place] Lic #: [id number]

## 2025-07-08 RX ORDER — TRAMADOL HYDROCHLORIDE 200 MG/1
200 TABLET, EXTENDED RELEASE ORAL NIGHTLY PRN
Qty: 30 TABLET | Refills: 0 | Status: SHIPPED | OUTPATIENT
Start: 2025-07-08

## 2025-07-11 ENCOUNTER — TREATMENT (OUTPATIENT)
Dept: PHYSICAL THERAPY | Facility: CLINIC | Age: 65
End: 2025-07-11
Payer: COMMERCIAL

## 2025-07-11 DIAGNOSIS — M70.62 TROCHANTERIC BURSITIS OF BOTH HIPS: Primary | ICD-10-CM

## 2025-07-11 DIAGNOSIS — G89.29 CHRONIC BILATERAL LOW BACK PAIN WITHOUT SCIATICA: ICD-10-CM

## 2025-07-11 DIAGNOSIS — M70.61 TROCHANTERIC BURSITIS OF BOTH HIPS: Primary | ICD-10-CM

## 2025-07-11 DIAGNOSIS — M54.50 CHRONIC BILATERAL LOW BACK PAIN WITHOUT SCIATICA: ICD-10-CM

## 2025-07-11 NOTE — PROGRESS NOTES
Physical Therapy Progress  Note/Re-assessment   [address] [facility], [address] [place] [address]     Patient: Sindy Martin   : 1960  Referring practitioner: Rubens Cali MD  Diagnosis:      ICD-10-CM ICD-9-CM   1. Trochanteric bursitis of both hips  M70.61 726.5    M70.62    2. Chronic bilateral low back pain without sciatica  M54.50 724.2    G89.29 338.29     Date of Initial Visit: Type: THERAPY  Noted: 2025  Today's Date: 2025    VISIT#: 7          Subjective   Sindy Martin reports: MRI scheduled for . She will schedule with Dr. Tompkins's office soon. She reports some improvement, about 20%. Walking up stairs aggravates the low back. She felt good after last session, though it did not last her through unloading groceries.     Modified Oswestry: 38% disability    Objective          Postural Observations    Additional Postural Observation Details  Iliac crests appear level in standing and supine  Left leg appears ~ 1/2 inch longer in supine, though equal in long sitting    Tenderness     Additional Tenderness Details  TTP L4-S2 spinous processes, bilateral PSIS (R>L), R QL    Non tender bilateral glute medius and piriformis    Active Range of Motion     Lumbar   Flexion: Active lumbar flexion: fingertips to ankles.   Extension: WFL  Left lateral flexion: WFL  Right lateral flexion: WFL  Left rotation: WFL  Right rotation: WFL    Additional Active Range of Motion Details  Bilateral hip IR/ER PROM WNL bialterally    Accessory motions: L2-5 slightly hypomobile, L3-4 causes reproduction of R side lower lumbar/SIJ symptoms    Strength/Myotome Testing     Left Hip   Planes of Motion   Flexion: 5  Extension: 5  Abduction: 4+  External rotation: 5  Internal rotation: 5    Right Hip   Planes of Motion   Flexion: 5  Extension: 4+  Abduction: 4+  External rotation: 5  Internal rotation: 5    Left Knee   Flexion: 5  Extension: 5    Right Knee   Flexion: 5  Extension: 5    Tests     Lumbar      Left   Negative passive SLR.     Right   Positive passive SLR.     Left Pelvic Girdle/Sacrum   Negative: sacrum compression, gapping and sacral spring.     Right Pelvic Girdle/Sacrum   Negative: sacrum compression, gapping and sacral spring.     Left Hip   Negative Gaenslen's.     Right Hip   Negative Gaenslen's.     Additional Tests Details  Slump test negative bilaterally  Stork test negative bilaterally  MISTY negative bilaterally  Active SLR (-) on L, (-) on R    Lumbar Flexibility Comments:   Hamstrings slightly tight bilaterally      See Exercise, Manual, and Modality Logs for complete treatment.       Assessment & Plan       Assessment  Impairments: abnormal or restricted ROM, activity intolerance, impaired physical strength and pain with function   Functional limitations: carrying objects, lifting, sleeping, walking, pulling, pushing, uncomfortable because of pain, moving in bed, sitting and standing   Assessment details: The patient is a 64 y.o. female who has been seen in physical therapy for 7 visits for chronic bilateral SIJ pain and low back pain. Upon re-evaluation, the patient demonstrates improved bilateral hip abduction and extension strength, though other deficits remain unchanged. She has tenderness along lumbar spinous processes and bilateral PSIS. She experiences referral of symptoms to right SIJ when performing L3-4 PA mobilizations. Due to these impairments, Sindy has pain when ambulating up stairs or when being active for a long period of time.     Note: patient continues to respond well to mechanical traction today  Prognosis: good    Goals  Plan Goals: SHORT TERM GOALS: Time for Goal Achievement: 6 weeks    1.  Patient to be compliant and progression of HEP  - Met                   2.  Pain level < 4/10 at worst with mentioned activities to improve function - Not assessed today  3.  Patient to report no pain with sleep. - Not assessed today    LONG TERM GOALS: Time for Goal Achievement:  12 weeks  1.  Pt. to score < 30 % on Back Index - Ongoing  2.  Pain level < 3/10 with standing and lifting activities (such as unloading groceries) to return to normal daily functioning. - Ongoing  3.  Lumbar AROM to WFL to allow for return to household & recreational activities w/o increased symptoms - Met  4.  (B) LE strength to 5/5 to allow for lifting, pushing, pulling and functional activities to occur with proper form and without pain. - Ongoing              Plan  Therapy options: will be seen for skilled therapy services  Planned modality interventions: cryotherapy, TENS, dry needling, thermotherapy (hydrocollator packs), electrical stimulation/Russian stimulation and traction  Planned therapy interventions: flexibility, strengthening, stretching, therapeutic activities, functional ROM exercises, home exercise program, joint mobilization, soft tissue mobilization, spinal/joint mobilization, manual therapy, neuromuscular re-education, postural training, abdominal trunk stabilization and body mechanics training  Frequency: 2x week  Duration in weeks: 12  Treatment plan discussed with: patient  Plan details: Continue PT with concurrent evaluation at pain management.           Progress per Plan of Care           Timed:  Manual Therapy:         mins  01341;  Therapeutic Exercise:    25     mins  53468;     Neuromuscular Corey:        mins  53122;    Therapeutic Activity:          mins  01386;     Gait Training:           mins  89928;     Ultrasound:          mins  73187;    Electrical Stimulation:         mins  55467 ( );    Untimed:  Electrical Stimulation:         mins  43508 ( );  Mechanical Traction:    14     mins  70450;   Dry needling:         Self pay  Re-eval   _____ mins [id number]     Timed Treatment:   25   mins   Total Treatment:     39   mins  Valeria Adknis, PT, DPT, CLT, SHEKHARN  Physical Therapist

## 2025-07-14 ENCOUNTER — TELEPHONE (OUTPATIENT)
Dept: PHYSICAL THERAPY | Facility: CLINIC | Age: 65
End: 2025-07-14

## 2025-07-14 ENCOUNTER — TREATMENT (OUTPATIENT)
Dept: PHYSICAL THERAPY | Facility: CLINIC | Age: 65
End: 2025-07-14
Payer: COMMERCIAL

## 2025-07-14 DIAGNOSIS — M70.62 TROCHANTERIC BURSITIS OF BOTH HIPS: Primary | ICD-10-CM

## 2025-07-14 DIAGNOSIS — M70.61 TROCHANTERIC BURSITIS OF BOTH HIPS: Primary | ICD-10-CM

## 2025-07-14 DIAGNOSIS — M54.50 CHRONIC BILATERAL LOW BACK PAIN WITHOUT SCIATICA: ICD-10-CM

## 2025-07-14 DIAGNOSIS — G89.29 CHRONIC BILATERAL LOW BACK PAIN WITHOUT SCIATICA: ICD-10-CM

## 2025-07-14 PROCEDURE — 97112 NEUROMUSCULAR REEDUCATION: CPT | Performed by: PHYSICAL THERAPIST

## 2025-07-14 PROCEDURE — 97110 THERAPEUTIC EXERCISES: CPT | Performed by: PHYSICAL THERAPIST

## 2025-07-14 PROCEDURE — 97012 MECHANICAL TRACTION THERAPY: CPT | Performed by: PHYSICAL THERAPIST

## 2025-07-14 NOTE — TELEPHONE ENCOUNTER
Caller: Sindy Martin    Relationship: Self         What was the call regarding: CALLED WILL COME IN AT 1   ”

## 2025-07-14 NOTE — PROGRESS NOTES
Physical Therapy Daily Treatment Note  [facility] - [facility]  [address], [address]   [place] [address]     Patient: Sindy Martin   : 1960  Referring practitioner: Rubens Cali MD  Diagnosis:      ICD-10-CM ICD-9-CM   1. Trochanteric bursitis of both hips  M70.61 726.5    M70.62    2. Chronic bilateral low back pain without sciatica  M54.50 724.2    G89.29 338.29     Date of Initial Visit: Type: THERAPY  Noted: 2025  Today's Date: 2025    VISIT#: 8            Subjective   Sindy Martin reports: patient reports symptoms are the same. When she goes up her basement steps the right SIJ area bothers her. She felt good the remainder of the day after traction last session.     Objective   See Exercise, Manual, and Modality Logs for complete treatment.       ASSESSMENT:   Relief of right SIJ tightness with left sacral UPA mobilizations. Added lower trunk rotation to left to HEP in order to open right lumbosacral section. Tolerated exercises without pain today.      PLAN:  Continue gapping techniques for right lumbosacral, stabilization strengthening, and traction.       Timed:  Manual Therapy:    4     mins  70788;  Therapeutic Exercise:    15     mins  56548;     Neuromuscular Corey:     8   mins  24969;    Therapeutic Activity:          mins  91395;     Gait Training:           mins  64089;     Ultrasound:          mins  98673;    Electrical Stimulation:         mins  27088 ( );    Untimed:  Electrical Stimulation:         mins  93996 ( );  Mechanical Traction:    14     mins  87620;   Dry needling:       Self pay      Timed Treatment:   27   mins   Total Treatment:     41   mins      Valeria Adkins PT, DPT  Physical Therapist  [place] Lic #: [id number]

## 2025-07-16 NOTE — PROGRESS NOTES
H&P reviewed from neurosurgery.  Lower back pain with bilateral buttock and calf pain.  Previously seen by me for right SI joint injection in 2023.  MRI lumbar spine shows severe bilateral neuroforaminal stenosis at multiple levels.  Sent to us for LESI L4-5.    Patient has pain in the lower back with referred pain in the leg, patient has failed conservative therapy including PT and pharmacological management for more than 6 weeks and pain interferes with activities of daily living. Discussed lumbar FARRAH L4-5. Discussed the possibility of infection, bleeding, nerve damage, post dural puncture headache, increased pain, paraplegia. Patient understands and agrees.     Aguila Tompkins DO  Pain Management   [facility]

## 2025-07-17 ENCOUNTER — HOSPITAL ENCOUNTER (OUTPATIENT)
Dept: PAIN MEDICINE | Facility: HOSPITAL | Age: 65
Discharge: HOME | End: 2025-07-17
Payer: COMMERCIAL

## 2025-07-17 VITALS
HEIGHT: 61 IN | TEMPERATURE: 96.2 F | DIASTOLIC BLOOD PRESSURE: 80 MMHG | BODY MASS INDEX: 41.16 KG/M2 | OXYGEN SATURATION: 96 % | RESPIRATION RATE: 16 BRPM | WEIGHT: 218 LBS | SYSTOLIC BLOOD PRESSURE: 149 MMHG | HEART RATE: 80 BPM

## 2025-07-17 DIAGNOSIS — R52 PAIN: ICD-10-CM

## 2025-07-17 DIAGNOSIS — M54.16 LUMBAR RADICULOPATHY: Primary | ICD-10-CM

## 2025-07-17 PROCEDURE — 25510000001 IOPAMIDOL 41 % SOLUTION: Performed by: STUDENT IN AN ORGANIZED HEALTH CARE EDUCATION/TRAINING PROGRAM

## 2025-07-17 PROCEDURE — 77003 FLUOROGUIDE FOR SPINE INJECT: CPT

## 2025-07-17 PROCEDURE — 25010000002 METHYLPREDNISOLONE PER 40 MG: Performed by: STUDENT IN AN ORGANIZED HEALTH CARE EDUCATION/TRAINING PROGRAM

## 2025-07-17 RX ORDER — METHYLPREDNISOLONE ACETATE 40 MG/ML
40 INJECTION, SUSPENSION INTRA-ARTICULAR; INTRALESIONAL; INTRAMUSCULAR; SOFT TISSUE ONCE
Status: COMPLETED | OUTPATIENT
Start: 2025-07-17 | End: 2025-07-17

## 2025-07-17 RX ORDER — IOPAMIDOL 408 MG/ML
3 INJECTION, SOLUTION INTRATHECAL
Status: COMPLETED | OUTPATIENT
Start: 2025-07-17 | End: 2025-07-17

## 2025-07-17 RX ADMIN — IOPAMIDOL 3 ML: 408 INJECTION, SOLUTION INTRATHECAL at 09:43

## 2025-07-17 RX ADMIN — METHYLPREDNISOLONE ACETATE 40 MG: 40 INJECTION, SUSPENSION INTRA-ARTICULAR; INTRALESIONAL; INTRAMUSCULAR; INTRASYNOVIAL; SOFT TISSUE at 09:43

## 2025-07-17 NOTE — PROCEDURES
PREOPERATIVE DIAGNOSIS:    1. Lumbar radiculopathy     POSTOPERATIVE DIAGNOSIS: Same    PROCEDURE:  Lumbar epidural steroid injection L 4-5    PROCEDURE NOTE:  After obtaining written informed consent patient was taken to the procedure room. Pre-procedure blood pressure and pulse were stable and recorded in patients clinic chart.     The patient was placed in the prone position. The lower back was prepped with antiseptic solution and draped in the usual sterile fashion.  The skin over the L4-5 space was identified under fluoroscopic guidance and infiltrated with 1% lidocaine for local anesthesia via 25 gauge needle.  A 20 gauge tuohy needle was used to access the epidural space using loss of resistance to air technique. Following negative aspiration, 2 cc of the omnipaque dye was injected.  There was good spread of the dye from L3-L5 area. A mixture containing  3 ml of saline with 40 mg of dep-medrol was injected. There was no evidence of CSF, paresthesia or vascular spread. The needle was removed. Skin was cleaned and band aid was applied.    Following the procedure the patient's vital signs were stable. The patient was discharged home in good condition after being given discharge instructions.    COMPLICATIONS: None    Aguila Tompkins DO  Pain Management   [facility]

## 2025-07-17 NOTE — DISCHARGE INSTRUCTIONS

## 2025-07-17 NOTE — ADDENDUM NOTE
Encounter addended by: Aguila Tompkins DO on: 7/17/2025 9:50 AM   Actions taken: Clinical Note Signed, Problem List reviewed, Medication List reviewed, Allergies reviewed, Charge Capture section accepted

## 2025-07-18 ENCOUNTER — TELEPHONE (OUTPATIENT)
Dept: PHYSICAL THERAPY | Facility: CLINIC | Age: 65
End: 2025-07-18

## 2025-07-18 ENCOUNTER — TELEPHONE (OUTPATIENT)
Dept: PAIN MEDICINE | Facility: HOSPITAL | Age: 65
End: 2025-07-18
Payer: COMMERCIAL

## 2025-07-18 NOTE — TELEPHONE ENCOUNTER
Caller: Sindy Martin    Relationship: Self        What was the call regarding: HAD A EPIDURAL YESTERDAY AND IS NOT UP TO COMING TO THERAPY THIS AFTERNOON.

## 2025-07-21 ENCOUNTER — TREATMENT (OUTPATIENT)
Dept: PHYSICAL THERAPY | Facility: CLINIC | Age: 65
End: 2025-07-21
Payer: COMMERCIAL

## 2025-07-21 DIAGNOSIS — M70.61 TROCHANTERIC BURSITIS OF BOTH HIPS: Primary | ICD-10-CM

## 2025-07-21 DIAGNOSIS — M54.50 CHRONIC BILATERAL LOW BACK PAIN WITHOUT SCIATICA: ICD-10-CM

## 2025-07-21 DIAGNOSIS — G89.29 CHRONIC BILATERAL LOW BACK PAIN WITHOUT SCIATICA: ICD-10-CM

## 2025-07-21 DIAGNOSIS — M70.62 TROCHANTERIC BURSITIS OF BOTH HIPS: Primary | ICD-10-CM

## 2025-07-21 PROCEDURE — 97012 MECHANICAL TRACTION THERAPY: CPT | Performed by: PHYSICAL THERAPIST

## 2025-07-21 PROCEDURE — 97110 THERAPEUTIC EXERCISES: CPT | Performed by: PHYSICAL THERAPIST

## 2025-07-21 PROCEDURE — 97112 NEUROMUSCULAR REEDUCATION: CPT | Performed by: PHYSICAL THERAPIST

## 2025-07-21 NOTE — PROGRESS NOTES
Physical Therapy Daily Treatment Note  [facility] - [facility]  [address], [address]   [place] [address]     Patient: Sindy Martin   : 1960  Referring practitioner: Rubens Cali MD  Diagnosis:      ICD-10-CM ICD-9-CM   1. Trochanteric bursitis of both hips  M70.61 726.5    M70.62    2. Chronic bilateral low back pain without sciatica  M54.50 724.2    G89.29 338.29     Date of Initial Visit: Type: THERAPY  Noted: 2025  Today's Date: 2025    VISIT#: 9            Subjective   Sindy Martin reports: She had injection last Thursday, reports relief from pain and ability to sleep without waking. Pain still comes and goes during the  day though it is not constant.     Objective   See Exercise, Manual, and Modality Logs for complete treatment.       ASSESSMENT:   Tolerated traction and exercise well. Able to perform hip abduction standing upright rather than bent over, slight pull that disappears after stopping. Focus on trunk control, not rotating.       PLAN:  Plan to discharge to [facility] next session      Timed:  Manual Therapy:         mins  02344;  Therapeutic Exercise:    14     mins  67738;    Neuromuscular Corey:    10    mins  23153;    Therapeutic Activity:          mins  39064;     Gait Training:           mins  38143;     Ultrasound:          mins  63979;    Electrical Stimulation:         mins  37178 ( );    Untimed:  Electrical Stimulation:        mins  95047 ( );  Mechanical Traction:    12     mins  32127;   Dry needling:       Self pay      Timed Treatment:   24   mins   Total Treatment:     36   mins      Valeria Adkins PT, DPT  Physical Therapist  [place] Lic #: [id number]

## 2025-07-25 ENCOUNTER — TREATMENT (OUTPATIENT)
Dept: PHYSICAL THERAPY | Facility: CLINIC | Age: 65
End: 2025-07-25
Payer: COMMERCIAL

## 2025-07-25 DIAGNOSIS — M70.62 TROCHANTERIC BURSITIS OF BOTH HIPS: Primary | ICD-10-CM

## 2025-07-25 DIAGNOSIS — M70.61 TROCHANTERIC BURSITIS OF BOTH HIPS: Primary | ICD-10-CM

## 2025-07-25 DIAGNOSIS — G89.29 CHRONIC BILATERAL LOW BACK PAIN WITHOUT SCIATICA: ICD-10-CM

## 2025-07-25 DIAGNOSIS — M54.50 CHRONIC BILATERAL LOW BACK PAIN WITHOUT SCIATICA: ICD-10-CM

## 2025-07-25 PROCEDURE — 97110 THERAPEUTIC EXERCISES: CPT | Performed by: PHYSICAL THERAPIST

## 2025-07-25 PROCEDURE — 97012 MECHANICAL TRACTION THERAPY: CPT | Performed by: PHYSICAL THERAPIST

## 2025-07-25 NOTE — PROGRESS NOTES
Physical Therapy Daily Treatment Note  [facility] - [facility]  [address], [address]   [place] [address]     Patient: Sindy Martin   : 1960  Referring practitioner: Rubens Cali MD  Diagnosis:      ICD-10-CM ICD-9-CM   1. Trochanteric bursitis of both hips  M70.61 726.5    M70.62    2. Chronic bilateral low back pain without sciatica  M54.50 724.2    G89.29 338.29     Date of Initial Visit: Type: THERAPY  Noted: 2025  Today's Date: 2025    VISIT#: 10            Subjective   Sindy Martin reports: she can go up and down her steps without pain and is thankful to sleep through the night. She has a small twinge at the right low back currently, blames the warm weather.     Objective   See Exercise, Manual, and Modality Logs for complete treatment.       ASSESSMENT:   Reviewed HEP exercises to continue at home, several times per week and then as needed for maintenance. Printed additional optional exercises today. No right low back or SIJ discomfort during exercise today, patient feeling well at end of session. All patient questions answered to apparent satisfaction.       PLAN:  Discharge to [facility]      Timed:  Manual Therapy:         mins  74737;  Therapeutic Exercise:    23     mins  04222;     Neuromuscular Corey:        mins  27376;    Therapeutic Activity:          mins  32644;     Gait Training:           mins  42114;     Ultrasound:          mins  89794;    Electrical Stimulation:         mins  33895 ( );    Untimed:  Electrical Stimulation:         mins  99470 ( );  Mechanical Traction:   12      mins  02203;   Dry needling:       Self pay      Timed Treatment:   23   mins   Total Treatment:     35   mins      Valeria Adkins PT, DPT  Physical Therapist  [place] Lic #: [id number]

## 2025-07-31 ENCOUNTER — HOSPITAL ENCOUNTER (OUTPATIENT)
Dept: MRI IMAGING | Facility: HOSPITAL | Age: 65
Discharge: HOME | End: 2025-07-31
Payer: COMMERCIAL

## 2025-07-31 DIAGNOSIS — G89.29 CHRONIC SI JOINT PAIN: ICD-10-CM

## 2025-07-31 DIAGNOSIS — M53.3 CHRONIC SI JOINT PAIN: ICD-10-CM

## 2025-07-31 DIAGNOSIS — M54.50 CHRONIC BILATERAL LOW BACK PAIN WITHOUT SCIATICA: ICD-10-CM

## 2025-07-31 DIAGNOSIS — G89.29 CHRONIC BILATERAL LOW BACK PAIN WITHOUT SCIATICA: ICD-10-CM

## 2025-07-31 PROCEDURE — 72141 MRI NECK SPINE W/O DYE: CPT

## 2025-08-13 ENCOUNTER — TELEPHONE (OUTPATIENT)
Dept: PAIN MEDICINE | Facility: CLINIC | Age: 65
End: 2025-08-13
Payer: COMMERCIAL

## (undated) DEVICE — PK ENDO GI 50

## (undated) DEVICE — SINGLE-USE BIOPSY FORCEPS: Brand: RADIAL JAW 4

## (undated) DEVICE — BITEBLOCK ENDO W/STRAP 60F A/ LF DISP